# Patient Record
Sex: FEMALE | Race: WHITE | Employment: OTHER | URBAN - METROPOLITAN AREA
[De-identification: names, ages, dates, MRNs, and addresses within clinical notes are randomized per-mention and may not be internally consistent; named-entity substitution may affect disease eponyms.]

---

## 2017-04-17 PROBLEM — E66.9 OBESITY (BMI 30.0-34.9): Status: ACTIVE | Noted: 2017-04-17

## 2017-04-17 PROBLEM — E66.811 OBESITY (BMI 30.0-34.9): Status: ACTIVE | Noted: 2017-04-17

## 2018-04-19 ENCOUNTER — OFFICE VISIT (OUTPATIENT)
Dept: ENT CLINIC | Age: 73
End: 2018-04-19
Payer: MEDICARE

## 2018-04-19 VITALS
WEIGHT: 167 LBS | SYSTOLIC BLOOD PRESSURE: 150 MMHG | HEART RATE: 83 BPM | OXYGEN SATURATION: 93 % | DIASTOLIC BLOOD PRESSURE: 86 MMHG | BODY MASS INDEX: 30.73 KG/M2 | HEIGHT: 62 IN

## 2018-04-19 DIAGNOSIS — J30.1 CHRONIC SEASONAL ALLERGIC RHINITIS DUE TO POLLEN: ICD-10-CM

## 2018-04-19 DIAGNOSIS — H61.23 BILATERAL IMPACTED CERUMEN: Primary | ICD-10-CM

## 2018-04-19 DIAGNOSIS — H69.81 ETD (EUSTACHIAN TUBE DYSFUNCTION), RIGHT: ICD-10-CM

## 2018-04-19 PROCEDURE — 99213 OFFICE O/P EST LOW 20 MIN: CPT | Performed by: OTOLARYNGOLOGY

## 2018-04-19 PROCEDURE — 69210 REMOVE IMPACTED EAR WAX UNI: CPT | Performed by: OTOLARYNGOLOGY

## 2018-04-19 ASSESSMENT — ENCOUNTER SYMPTOMS
COLOR CHANGE: 0
SHORTNESS OF BREATH: 0
GASTROINTESTINAL NEGATIVE: 1
ABDOMINAL PAIN: 0
EYES NEGATIVE: 1
RHINORRHEA: 1
RESPIRATORY NEGATIVE: 1

## 2018-04-24 ENCOUNTER — OFFICE VISIT (OUTPATIENT)
Dept: FAMILY MEDICINE CLINIC | Age: 73
End: 2018-04-24
Payer: MEDICARE

## 2018-04-24 VITALS
OXYGEN SATURATION: 98 % | BODY MASS INDEX: 31.24 KG/M2 | HEIGHT: 62 IN | HEART RATE: 53 BPM | DIASTOLIC BLOOD PRESSURE: 70 MMHG | SYSTOLIC BLOOD PRESSURE: 110 MMHG | RESPIRATION RATE: 18 BRPM | TEMPERATURE: 99 F | WEIGHT: 169.75 LBS

## 2018-04-24 DIAGNOSIS — Z00.00 ROUTINE GENERAL MEDICAL EXAMINATION AT A HEALTH CARE FACILITY: ICD-10-CM

## 2018-04-24 DIAGNOSIS — Z23 NEED FOR PROPHYLACTIC VACCINATION AND INOCULATION AGAINST VARICELLA: ICD-10-CM

## 2018-04-24 PROCEDURE — G0439 PPPS, SUBSEQ VISIT: HCPCS | Performed by: FAMILY MEDICINE

## 2018-04-24 RX ORDER — ASPIRIN 81 MG/1
81 TABLET, CHEWABLE ORAL DAILY
Qty: 90 TABLET | Refills: 1 | Status: SHIPPED | OUTPATIENT
Start: 2018-04-24 | End: 2019-07-07 | Stop reason: SDUPTHER

## 2018-04-24 ASSESSMENT — ANXIETY QUESTIONNAIRES: GAD7 TOTAL SCORE: 0

## 2018-04-24 ASSESSMENT — PATIENT HEALTH QUESTIONNAIRE - PHQ9: SUM OF ALL RESPONSES TO PHQ QUESTIONS 1-9: 0

## 2018-04-24 ASSESSMENT — LIFESTYLE VARIABLES: HOW OFTEN DO YOU HAVE A DRINK CONTAINING ALCOHOL: 0

## 2018-04-25 ENCOUNTER — TELEPHONE (OUTPATIENT)
Dept: FAMILY MEDICINE CLINIC | Age: 73
End: 2018-04-25

## 2018-04-25 DIAGNOSIS — E78.2 MIXED HYPERLIPIDEMIA: ICD-10-CM

## 2018-04-25 RX ORDER — SIMVASTATIN 20 MG
20 TABLET ORAL NIGHTLY
Qty: 90 TABLET | Refills: 1
Start: 2018-04-25 | End: 2018-05-31 | Stop reason: SDUPTHER

## 2018-05-24 ENCOUNTER — NURSE ONLY (OUTPATIENT)
Dept: FAMILY MEDICINE CLINIC | Age: 73
End: 2018-05-24
Payer: MEDICARE

## 2018-05-24 ENCOUNTER — HOSPITAL ENCOUNTER (OUTPATIENT)
Age: 73
Discharge: HOME | End: 2018-05-26
Payer: MEDICARE

## 2018-05-24 DIAGNOSIS — I10 ESSENTIAL HYPERTENSION: ICD-10-CM

## 2018-05-24 DIAGNOSIS — E78.1 PURE HYPERGLYCERIDEMIA: ICD-10-CM

## 2018-05-24 DIAGNOSIS — E55.9 VITAMIN D DEFICIENCY: ICD-10-CM

## 2018-05-24 DIAGNOSIS — E03.9 ACQUIRED HYPOTHYROIDISM: ICD-10-CM

## 2018-05-24 PROBLEM — Z00.00 HEALTH CARE MAINTENANCE: Status: RESOLVED | Noted: 2018-04-24 | Resolved: 2018-05-24

## 2018-05-24 LAB
ALBUMIN SERPL-MCNC: 4.6 G/DL (ref 3.5–5.2)
ALP BLD-CCNC: 58 U/L (ref 35–104)
ALT SERPL-CCNC: 17 U/L (ref 0–32)
ANION GAP SERPL CALCULATED.3IONS-SCNC: 15 MMOL/L (ref 7–16)
AST SERPL-CCNC: 26 U/L (ref 0–31)
BASOPHILS ABSOLUTE: 0.02 E9/L (ref 0–0.2)
BASOPHILS RELATIVE PERCENT: 0.3 % (ref 0–2)
BILIRUB SERPL-MCNC: 0.3 MG/DL (ref 0–1.2)
BUN BLDV-MCNC: 16 MG/DL (ref 8–23)
CALCIUM SERPL-MCNC: 10 MG/DL (ref 8.6–10.2)
CHLORIDE BLD-SCNC: 100 MMOL/L (ref 98–107)
CHOLESTEROL, TOTAL: 167 MG/DL (ref 0–199)
CO2: 29 MMOL/L (ref 22–29)
CREAT SERPL-MCNC: 0.8 MG/DL (ref 0.5–1)
EOSINOPHILS ABSOLUTE: 0.09 E9/L (ref 0.05–0.5)
EOSINOPHILS RELATIVE PERCENT: 1.3 % (ref 0–6)
GFR AFRICAN AMERICAN: >60
GFR NON-AFRICAN AMERICAN: >60 ML/MIN/1.73
GLUCOSE BLD-MCNC: 83 MG/DL (ref 74–109)
HCT VFR BLD CALC: 44.3 % (ref 34–48)
HDLC SERPL-MCNC: 40 MG/DL
HEMOGLOBIN: 13.8 G/DL (ref 11.5–15.5)
IMMATURE GRANULOCYTES #: 0.02 E9/L
IMMATURE GRANULOCYTES %: 0.3 % (ref 0–5)
LDL CHOLESTEROL CALCULATED: 100 MG/DL (ref 0–99)
LYMPHOCYTES ABSOLUTE: 1.81 E9/L (ref 1.5–4)
LYMPHOCYTES RELATIVE PERCENT: 26.7 % (ref 20–42)
MCH RBC QN AUTO: 30.5 PG (ref 26–35)
MCHC RBC AUTO-ENTMCNC: 31.2 % (ref 32–34.5)
MCV RBC AUTO: 98 FL (ref 80–99.9)
MONOCYTES ABSOLUTE: 0.43 E9/L (ref 0.1–0.95)
MONOCYTES RELATIVE PERCENT: 6.4 % (ref 2–12)
NEUTROPHILS ABSOLUTE: 4.4 E9/L (ref 1.8–7.3)
NEUTROPHILS RELATIVE PERCENT: 65 % (ref 43–80)
PDW BLD-RTO: 13 FL (ref 11.5–15)
PLATELET # BLD: 180 E9/L (ref 130–450)
PMV BLD AUTO: 11.3 FL (ref 7–12)
POTASSIUM SERPL-SCNC: 4.2 MMOL/L (ref 3.5–5)
RBC # BLD: 4.52 E12/L (ref 3.5–5.5)
SODIUM BLD-SCNC: 144 MMOL/L (ref 132–146)
T4 TOTAL: 9.4 MCG/DL (ref 4.5–11.7)
TOTAL PROTEIN: 7.3 G/DL (ref 6.4–8.3)
TRIGL SERPL-MCNC: 137 MG/DL (ref 0–149)
TSH SERPL DL<=0.05 MIU/L-ACNC: 0.78 UIU/ML (ref 0.27–4.2)
VITAMIN D 25-HYDROXY: 53 NG/ML (ref 30–100)
VLDLC SERPL CALC-MCNC: 27 MG/DL
WBC # BLD: 6.8 E9/L (ref 4.5–11.5)

## 2018-05-24 PROCEDURE — 85025 COMPLETE CBC W/AUTO DIFF WBC: CPT

## 2018-05-24 PROCEDURE — 82306 VITAMIN D 25 HYDROXY: CPT

## 2018-05-24 PROCEDURE — 80061 LIPID PANEL: CPT

## 2018-05-24 PROCEDURE — 84443 ASSAY THYROID STIM HORMONE: CPT

## 2018-05-24 PROCEDURE — 36415 COLL VENOUS BLD VENIPUNCTURE: CPT | Performed by: FAMILY MEDICINE

## 2018-05-24 PROCEDURE — 84436 ASSAY OF TOTAL THYROXINE: CPT

## 2018-05-24 PROCEDURE — 80053 COMPREHEN METABOLIC PANEL: CPT

## 2018-05-31 ENCOUNTER — OFFICE VISIT (OUTPATIENT)
Dept: FAMILY MEDICINE CLINIC | Age: 73
End: 2018-05-31
Payer: MEDICARE

## 2018-05-31 VITALS
TEMPERATURE: 99.2 F | BODY MASS INDEX: 31.12 KG/M2 | HEIGHT: 62 IN | HEART RATE: 53 BPM | WEIGHT: 169.12 LBS | RESPIRATION RATE: 16 BRPM | OXYGEN SATURATION: 95 % | DIASTOLIC BLOOD PRESSURE: 80 MMHG | SYSTOLIC BLOOD PRESSURE: 138 MMHG

## 2018-05-31 DIAGNOSIS — Z12.39 BREAST CANCER SCREENING: ICD-10-CM

## 2018-05-31 DIAGNOSIS — I10 ESSENTIAL HYPERTENSION: Primary | ICD-10-CM

## 2018-05-31 DIAGNOSIS — E78.2 MIXED HYPERLIPIDEMIA: ICD-10-CM

## 2018-05-31 DIAGNOSIS — Z00.00 HEALTH CARE MAINTENANCE: ICD-10-CM

## 2018-05-31 PROCEDURE — 99213 OFFICE O/P EST LOW 20 MIN: CPT | Performed by: FAMILY MEDICINE

## 2018-05-31 RX ORDER — BENAZEPRIL HYDROCHLORIDE 40 MG/1
TABLET, FILM COATED ORAL
Qty: 90 TABLET | Refills: 1 | Status: SHIPPED | OUTPATIENT
Start: 2018-05-31 | End: 2019-01-08 | Stop reason: SDUPTHER

## 2018-05-31 RX ORDER — SIMVASTATIN 20 MG
20 TABLET ORAL NIGHTLY
Qty: 90 TABLET | Refills: 1 | Status: SHIPPED | OUTPATIENT
Start: 2018-05-31 | End: 2019-01-08 | Stop reason: SDUPTHER

## 2018-05-31 RX ORDER — UBIDECARENONE 10 MG
120 CAPSULE ORAL DAILY
Qty: 120 CAPSULE | Refills: 2
Start: 2018-05-31 | End: 2018-10-11 | Stop reason: SDUPTHER

## 2018-05-31 RX ORDER — POTASSIUM CHLORIDE 750 MG/1
TABLET, EXTENDED RELEASE ORAL
Qty: 90 TABLET | Refills: 1 | Status: SHIPPED | OUTPATIENT
Start: 2018-05-31 | End: 2018-10-15 | Stop reason: SDUPTHER

## 2018-05-31 ASSESSMENT — ENCOUNTER SYMPTOMS
SPUTUM PRODUCTION: 0
NAUSEA: 0
WHEEZING: 0
SHORTNESS OF BREATH: 0
BLOOD IN STOOL: 0
ORTHOPNEA: 0
BLURRED VISION: 0
ABDOMINAL PAIN: 0
CONSTIPATION: 0
SORE THROAT: 0
BACK PAIN: 0
COUGH: 0
VOMITING: 0
DIARRHEA: 0
HEARTBURN: 0
DOUBLE VISION: 0

## 2018-06-26 DIAGNOSIS — I10 ESSENTIAL HYPERTENSION: ICD-10-CM

## 2018-06-26 RX ORDER — DILTIAZEM HYDROCHLORIDE 360 MG/1
360 CAPSULE, EXTENDED RELEASE ORAL DAILY
Qty: 90 CAPSULE | Refills: 1 | Status: SHIPPED | OUTPATIENT
Start: 2018-06-26 | End: 2018-10-29 | Stop reason: SDUPTHER

## 2018-06-26 RX ORDER — HYDROCHLOROTHIAZIDE 25 MG/1
TABLET ORAL
Qty: 90 TABLET | Refills: 1 | Status: SHIPPED | OUTPATIENT
Start: 2018-06-26 | End: 2018-10-11 | Stop reason: SDUPTHER

## 2018-10-11 ENCOUNTER — OFFICE VISIT (OUTPATIENT)
Dept: ENT CLINIC | Age: 73
End: 2018-10-11
Payer: MEDICARE

## 2018-10-11 VITALS
WEIGHT: 167 LBS | BODY MASS INDEX: 30.73 KG/M2 | HEIGHT: 62 IN | DIASTOLIC BLOOD PRESSURE: 70 MMHG | SYSTOLIC BLOOD PRESSURE: 120 MMHG | OXYGEN SATURATION: 94 % | HEART RATE: 75 BPM

## 2018-10-11 DIAGNOSIS — J30.1 CHRONIC SEASONAL ALLERGIC RHINITIS DUE TO POLLEN: ICD-10-CM

## 2018-10-11 DIAGNOSIS — H61.23 BILATERAL IMPACTED CERUMEN: Primary | ICD-10-CM

## 2018-10-11 DIAGNOSIS — H69.81 ETD (EUSTACHIAN TUBE DYSFUNCTION), RIGHT: ICD-10-CM

## 2018-10-11 PROCEDURE — 99212 OFFICE O/P EST SF 10 MIN: CPT | Performed by: OTOLARYNGOLOGY

## 2018-10-11 ASSESSMENT — ENCOUNTER SYMPTOMS
RHINORRHEA: 1
GASTROINTESTINAL NEGATIVE: 1
SHORTNESS OF BREATH: 0
RESPIRATORY NEGATIVE: 1
COLOR CHANGE: 0
EYES NEGATIVE: 1
ABDOMINAL PAIN: 0

## 2018-10-15 DIAGNOSIS — I10 ESSENTIAL HYPERTENSION: ICD-10-CM

## 2018-10-16 RX ORDER — POTASSIUM CHLORIDE 750 MG/1
TABLET, EXTENDED RELEASE ORAL
Qty: 90 TABLET | Refills: 1 | Status: SHIPPED | OUTPATIENT
Start: 2018-10-16 | End: 2019-01-08 | Stop reason: SDUPTHER

## 2018-10-29 DIAGNOSIS — I10 ESSENTIAL HYPERTENSION: ICD-10-CM

## 2018-10-29 RX ORDER — DILTIAZEM HYDROCHLORIDE 360 MG/1
360 CAPSULE, EXTENDED RELEASE ORAL DAILY
Qty: 90 CAPSULE | Refills: 1 | Status: SHIPPED | OUTPATIENT
Start: 2018-10-29 | End: 2019-01-08 | Stop reason: SDUPTHER

## 2018-10-29 NOTE — TELEPHONE ENCOUNTER
From: Arsen Carroll  Sent: 10/27/2018 1:06 PM EDT  Subject: Medication Renewal Request    Arsen Carroll would like a refill of the following medications:     diltiazem (TIAZAC) 360 MG extended release capsule Mirza Arrieta MD]   Patient Comment: PLEASE SEND SCRIPT TO [address] [place] [address]    Preferred pharmacy: [address] [phone]

## 2018-10-30 ENCOUNTER — TELEPHONE (OUTPATIENT)
Dept: ENT CLINIC | Age: 73
End: 2018-10-30

## 2018-10-30 ENCOUNTER — TELEPHONE (OUTPATIENT)
Dept: FAMILY MEDICINE CLINIC | Age: 73
End: 2018-10-30

## 2018-10-30 ENCOUNTER — OFFICE VISIT (OUTPATIENT)
Dept: ENT CLINIC | Age: 73
End: 2018-10-30
Payer: MEDICARE

## 2018-10-30 VITALS
OXYGEN SATURATION: 93 % | HEART RATE: 77 BPM | WEIGHT: 167 LBS | DIASTOLIC BLOOD PRESSURE: 93 MMHG | BODY MASS INDEX: 30.73 KG/M2 | SYSTOLIC BLOOD PRESSURE: 163 MMHG | HEIGHT: 62 IN

## 2018-10-30 DIAGNOSIS — H61.23 BILATERAL IMPACTED CERUMEN: Primary | ICD-10-CM

## 2018-10-30 PROCEDURE — 92504 EAR MICROSCOPY EXAMINATION: CPT | Performed by: OTOLARYNGOLOGY

## 2018-10-30 PROCEDURE — 99213 OFFICE O/P EST LOW 20 MIN: CPT | Performed by: OTOLARYNGOLOGY

## 2018-10-30 ASSESSMENT — ENCOUNTER SYMPTOMS
ABDOMINAL PAIN: 0
EYES NEGATIVE: 1
RESPIRATORY NEGATIVE: 1
COLOR CHANGE: 0
SHORTNESS OF BREATH: 0
GASTROINTESTINAL NEGATIVE: 1
RHINORRHEA: 1

## 2018-10-30 NOTE — TELEPHONE ENCOUNTER
Dr. Taylor [facility] office called and said Germaine Archer was in their office today and her blood pressure readings were 163/93 and 156/83

## 2018-11-06 ENCOUNTER — OFFICE VISIT (OUTPATIENT)
Dept: ENT CLINIC | Age: 73
End: 2018-11-06
Payer: MEDICARE

## 2018-11-06 VITALS
WEIGHT: 167 LBS | HEIGHT: 62 IN | HEART RATE: 79 BPM | OXYGEN SATURATION: 99 % | DIASTOLIC BLOOD PRESSURE: 89 MMHG | SYSTOLIC BLOOD PRESSURE: 158 MMHG | BODY MASS INDEX: 30.73 KG/M2

## 2018-11-06 DIAGNOSIS — H61.23 BILATERAL IMPACTED CERUMEN: Primary | ICD-10-CM

## 2018-11-06 PROCEDURE — 99212 OFFICE O/P EST SF 10 MIN: CPT | Performed by: OTOLARYNGOLOGY

## 2018-11-06 ASSESSMENT — ENCOUNTER SYMPTOMS
COLOR CHANGE: 0
EYES NEGATIVE: 1
GASTROINTESTINAL NEGATIVE: 1
RHINORRHEA: 1
SHORTNESS OF BREATH: 0
ABDOMINAL PAIN: 0
RESPIRATORY NEGATIVE: 1

## 2018-11-06 NOTE — PROGRESS NOTES
Subjective:      Patient ID:  Chinmay He is a 68 y.o. female. HPI:    Pt presents with a history of cerumen impaction removal.   The patients ear was last cleaned 1 week ago. The patient was using ear drops to loosen wax immediately prior to thisvisit. Hearing aids: no      Patient's medications, allergies, past medical,surgical, social and family histories were reviewed and updated as appropriate. Review of Systems   Constitutional: Negative. HENT: Positive for congestion, postnasal drip and rhinorrhea. Negative for ear discharge, ear pain and hearing loss. Eyes: Negative. Negative for visual disturbance. Respiratory: Negative. Negative for shortness of breath. Cardiovascular: Negative. Negative for chest pain. Gastrointestinal: Negative. Negative for abdominal pain. Genitourinary: Negative. Musculoskeletal: Negative. Skin: Negative. Negative for color change. Neurological: Negative. Psychiatric/Behavioral: Negative. Negative for behavioral problems and hallucinations. All other systems reviewed and are negative. Objective:   Physical Exam   Constitutional: She is oriented to person, place, and time. She appears well-developed and well-nourished. HENT:   Head: Normocephalic and atraumatic. Right Ear: Tympanic membrane, external ear and ear canal normal. Decreased hearing is noted. Left Ear: Hearing, tympanic membrane, external ear and ear canal normal.   Nose: Mucosal edema and rhinorrhea present. Mouth/Throat: Uvula is midline, oropharynx is clear and moist and mucous membranes are normal.   Cerumen is really hard in the right ear today and causing the patient pain on moving it. Eyes: Pupils are equal, round, and reactive to light. Conjunctivae and EOM are normal.   Neck: Normal range of motion. Neck supple. Cardiovascular: Normal rate, regular rhythm and normal heart sounds.     Pulmonary/Chest: Effort normal and breath sounds

## 2018-11-08 ENCOUNTER — TELEPHONE (OUTPATIENT)
Dept: ENT CLINIC | Age: 73
End: 2018-11-08

## 2018-11-16 ENCOUNTER — HOSPITAL ENCOUNTER (OUTPATIENT)
Dept: GENERAL RADIOLOGY | Age: 73
Discharge: HOME | End: 2018-11-18
Payer: MEDICARE

## 2018-11-16 DIAGNOSIS — Z00.00 HEALTH CARE MAINTENANCE: ICD-10-CM

## 2018-11-16 DIAGNOSIS — Z12.39 BREAST CANCER SCREENING: ICD-10-CM

## 2018-11-16 PROCEDURE — 77063 BREAST TOMOSYNTHESIS BI: CPT

## 2018-12-06 RX ORDER — POTASSIUM CHLORIDE 750 MG/1
TABLET, FILM COATED, EXTENDED RELEASE ORAL
Qty: 90 TABLET | Refills: 1 | Status: SHIPPED | OUTPATIENT
Start: 2018-12-06 | End: 2019-05-02 | Stop reason: ALTCHOICE

## 2019-01-08 ENCOUNTER — OFFICE VISIT (OUTPATIENT)
Dept: FAMILY MEDICINE CLINIC | Age: 74
End: 2019-01-08
Payer: MEDICARE

## 2019-01-08 VITALS
HEIGHT: 62 IN | BODY MASS INDEX: 30.91 KG/M2 | SYSTOLIC BLOOD PRESSURE: 138 MMHG | HEART RATE: 76 BPM | DIASTOLIC BLOOD PRESSURE: 72 MMHG | TEMPERATURE: 98.6 F | OXYGEN SATURATION: 95 % | WEIGHT: 168 LBS

## 2019-01-08 DIAGNOSIS — R06.02 SHORTNESS OF BREATH: ICD-10-CM

## 2019-01-08 DIAGNOSIS — E55.9 VITAMIN D DEFICIENCY: ICD-10-CM

## 2019-01-08 DIAGNOSIS — R53.83 FATIGUE, UNSPECIFIED TYPE: Primary | ICD-10-CM

## 2019-01-08 DIAGNOSIS — E78.2 MIXED HYPERLIPIDEMIA: ICD-10-CM

## 2019-01-08 DIAGNOSIS — E03.9 ACQUIRED HYPOTHYROIDISM: ICD-10-CM

## 2019-01-08 DIAGNOSIS — I10 ESSENTIAL HYPERTENSION: ICD-10-CM

## 2019-01-08 PROCEDURE — 99215 OFFICE O/P EST HI 40 MIN: CPT | Performed by: FAMILY MEDICINE

## 2019-01-08 PROCEDURE — 93000 ELECTROCARDIOGRAM COMPLETE: CPT | Performed by: FAMILY MEDICINE

## 2019-01-08 RX ORDER — DILTIAZEM HYDROCHLORIDE 360 MG/1
360 CAPSULE, EXTENDED RELEASE ORAL DAILY
Qty: 90 CAPSULE | Refills: 1 | Status: SHIPPED | OUTPATIENT
Start: 2019-01-08 | End: 2019-02-01 | Stop reason: SDUPTHER

## 2019-01-08 RX ORDER — HYDROCHLOROTHIAZIDE 25 MG/1
TABLET ORAL
Qty: 90 TABLET | Refills: 1 | Status: SHIPPED | OUTPATIENT
Start: 2019-01-08 | End: 2019-02-14 | Stop reason: SDUPTHER

## 2019-01-08 RX ORDER — SIMVASTATIN 20 MG
20 TABLET ORAL NIGHTLY
Qty: 90 TABLET | Refills: 1 | Status: SHIPPED | OUTPATIENT
Start: 2019-01-08 | End: 2019-02-01 | Stop reason: SDUPTHER

## 2019-01-08 RX ORDER — BENAZEPRIL HYDROCHLORIDE 40 MG/1
TABLET, FILM COATED ORAL
Qty: 90 TABLET | Refills: 1 | Status: SHIPPED | OUTPATIENT
Start: 2019-01-08 | End: 2019-02-01 | Stop reason: SDUPTHER

## 2019-01-08 RX ORDER — POTASSIUM CHLORIDE 750 MG/1
TABLET, EXTENDED RELEASE ORAL
Qty: 90 TABLET | Refills: 1
Start: 2019-01-08 | End: 2019-07-07 | Stop reason: SDUPTHER

## 2019-01-08 RX ORDER — LEVOTHYROXINE SODIUM 112 MCG
TABLET ORAL
Qty: 90 TABLET | Refills: 1 | Status: SHIPPED | OUTPATIENT
Start: 2019-01-08 | End: 2019-02-14 | Stop reason: SDUPTHER

## 2019-01-08 ASSESSMENT — ENCOUNTER SYMPTOMS
SPUTUM PRODUCTION: 0
CONSTIPATION: 0
DIARRHEA: 0
ORTHOPNEA: 0
VOMITING: 0
SHORTNESS OF BREATH: 0
SORE THROAT: 0
NAUSEA: 0
DOUBLE VISION: 0
COUGH: 0
ABDOMINAL PAIN: 0
WHEEZING: 0
BLURRED VISION: 0
HEARTBURN: 0
BACK PAIN: 0
BLOOD IN STOOL: 0

## 2019-01-15 ENCOUNTER — HOSPITAL ENCOUNTER (OUTPATIENT)
Dept: NUCLEAR MEDICINE | Age: 74
Discharge: HOME | End: 2019-01-17
Payer: MEDICARE

## 2019-01-15 ENCOUNTER — HOSPITAL ENCOUNTER (OUTPATIENT)
Dept: NON INVASIVE DIAGNOSTICS | Age: 74
Discharge: HOME | End: 2019-01-15
Payer: MEDICARE

## 2019-01-15 ENCOUNTER — TELEPHONE (OUTPATIENT)
Dept: FAMILY MEDICINE CLINIC | Age: 74
End: 2019-01-15

## 2019-01-15 DIAGNOSIS — R06.02 SHORTNESS OF BREATH: ICD-10-CM

## 2019-01-15 LAB
LV EF: 80 %
LVEF MODALITY: NORMAL

## 2019-01-15 PROCEDURE — 3430000000 HC RX DIAGNOSTIC RADIOPHARMACEUTICAL: Performed by: RADIOLOGY

## 2019-01-15 PROCEDURE — 93016 CV STRESS TEST SUPVJ ONLY: CPT | Performed by: INTERNAL MEDICINE

## 2019-01-15 PROCEDURE — 93018 CV STRESS TEST I&R ONLY: CPT | Performed by: INTERNAL MEDICINE

## 2019-01-15 PROCEDURE — 6360000002 HC RX W HCPCS: Performed by: FAMILY MEDICINE

## 2019-01-15 PROCEDURE — A9500 TC99M SESTAMIBI: HCPCS | Performed by: RADIOLOGY

## 2019-01-15 PROCEDURE — 78452 HT MUSCLE IMAGE SPECT MULT: CPT

## 2019-01-15 PROCEDURE — 93017 CV STRESS TEST TRACING ONLY: CPT

## 2019-01-15 RX ADMIN — Medication 11 MILLICURIE: at 07:55

## 2019-01-15 RX ADMIN — Medication 34 MILLICURIE: at 10:13

## 2019-01-15 RX ADMIN — REGADENOSON 0.4 MG: 0.08 INJECTION, SOLUTION INTRAVENOUS at 09:41

## 2019-02-01 DIAGNOSIS — E78.2 MIXED HYPERLIPIDEMIA: ICD-10-CM

## 2019-02-01 DIAGNOSIS — I10 ESSENTIAL HYPERTENSION: ICD-10-CM

## 2019-02-01 RX ORDER — DILTIAZEM HYDROCHLORIDE 360 MG/1
360 CAPSULE, EXTENDED RELEASE ORAL DAILY
Qty: 90 CAPSULE | Refills: 1 | Status: SHIPPED | OUTPATIENT
Start: 2019-02-01 | End: 2019-07-07 | Stop reason: SDUPTHER

## 2019-02-01 RX ORDER — SIMVASTATIN 20 MG
20 TABLET ORAL NIGHTLY
Qty: 90 TABLET | Refills: 1 | Status: SHIPPED | OUTPATIENT
Start: 2019-02-01 | End: 2019-04-24 | Stop reason: SDUPTHER

## 2019-02-01 RX ORDER — FLUTICASONE PROPIONATE 50 MCG
2 SPRAY, SUSPENSION (ML) NASAL DAILY
Qty: 1 BOTTLE | Refills: 3 | Status: SHIPPED | OUTPATIENT
Start: 2019-02-01 | End: 2019-07-07 | Stop reason: SDUPTHER

## 2019-02-01 RX ORDER — BENAZEPRIL HYDROCHLORIDE 40 MG/1
TABLET, FILM COATED ORAL
Qty: 90 TABLET | Refills: 1 | Status: SHIPPED | OUTPATIENT
Start: 2019-02-01 | End: 2019-04-24 | Stop reason: SDUPTHER

## 2019-02-14 DIAGNOSIS — E03.9 ACQUIRED HYPOTHYROIDISM: ICD-10-CM

## 2019-02-14 DIAGNOSIS — I10 ESSENTIAL HYPERTENSION: ICD-10-CM

## 2019-02-14 RX ORDER — HYDROCHLOROTHIAZIDE 25 MG/1
TABLET ORAL
Qty: 90 TABLET | Refills: 1 | Status: SHIPPED | OUTPATIENT
Start: 2019-02-14 | End: 2019-04-24 | Stop reason: SDUPTHER

## 2019-02-14 RX ORDER — LEVOTHYROXINE SODIUM 112 MCG
TABLET ORAL
Qty: 90 TABLET | Refills: 1 | Status: SHIPPED | OUTPATIENT
Start: 2019-02-14 | End: 2019-04-24 | Stop reason: SDUPTHER

## 2019-04-10 DIAGNOSIS — I10 ESSENTIAL HYPERTENSION: ICD-10-CM

## 2019-04-11 RX ORDER — POTASSIUM CHLORIDE 750 MG/1
TABLET, EXTENDED RELEASE ORAL
Qty: 90 TABLET | Refills: 1 | Status: ON HOLD | OUTPATIENT
Start: 2019-04-11 | End: 2019-05-08 | Stop reason: ALTCHOICE

## 2019-04-24 ENCOUNTER — OFFICE VISIT (OUTPATIENT)
Dept: FAMILY MEDICINE CLINIC | Age: 74
End: 2019-04-24
Payer: MEDICARE

## 2019-04-24 VITALS
OXYGEN SATURATION: 96 % | BODY MASS INDEX: 31.1 KG/M2 | DIASTOLIC BLOOD PRESSURE: 80 MMHG | WEIGHT: 169 LBS | HEART RATE: 69 BPM | TEMPERATURE: 98.3 F | HEIGHT: 62 IN | SYSTOLIC BLOOD PRESSURE: 160 MMHG

## 2019-04-24 DIAGNOSIS — I48.0 PAROXYSMAL ATRIAL FIBRILLATION (HCC): ICD-10-CM

## 2019-04-24 DIAGNOSIS — E78.2 MIXED HYPERLIPIDEMIA: ICD-10-CM

## 2019-04-24 DIAGNOSIS — E03.9 ACQUIRED HYPOTHYROIDISM: ICD-10-CM

## 2019-04-24 DIAGNOSIS — K59.04 CHRONIC IDIOPATHIC CONSTIPATION: ICD-10-CM

## 2019-04-24 DIAGNOSIS — Z00.00 ROUTINE GENERAL MEDICAL EXAMINATION AT A HEALTH CARE FACILITY: Primary | ICD-10-CM

## 2019-04-24 DIAGNOSIS — I10 ESSENTIAL HYPERTENSION: ICD-10-CM

## 2019-04-24 PROCEDURE — G0439 PPPS, SUBSEQ VISIT: HCPCS | Performed by: FAMILY MEDICINE

## 2019-04-24 RX ORDER — POLYETHYLENE GLYCOL 3350 17 G/17G
POWDER, FOR SOLUTION ORAL
Qty: 527 G | Refills: 3 | Status: SHIPPED | OUTPATIENT
Start: 2019-04-24 | End: 2019-05-02 | Stop reason: ALTCHOICE

## 2019-04-24 RX ORDER — LEVOTHYROXINE SODIUM 112 MCG
TABLET ORAL
Qty: 90 TABLET | Refills: 3 | Status: SHIPPED | OUTPATIENT
Start: 2019-04-24 | End: 2019-07-07 | Stop reason: SDUPTHER

## 2019-04-24 RX ORDER — HYDROCHLOROTHIAZIDE 25 MG/1
TABLET ORAL
Qty: 90 TABLET | Refills: 3 | Status: SHIPPED | OUTPATIENT
Start: 2019-04-24 | End: 2019-07-07 | Stop reason: SDUPTHER

## 2019-04-24 RX ORDER — SIMVASTATIN 20 MG
20 TABLET ORAL NIGHTLY
Qty: 90 TABLET | Refills: 3 | Status: SHIPPED | OUTPATIENT
Start: 2019-04-24 | End: 2019-07-07 | Stop reason: SDUPTHER

## 2019-04-24 RX ORDER — BENAZEPRIL HYDROCHLORIDE 40 MG/1
TABLET, FILM COATED ORAL
Qty: 90 TABLET | Refills: 3 | Status: SHIPPED | OUTPATIENT
Start: 2019-04-24 | End: 2019-07-07 | Stop reason: SDUPTHER

## 2019-04-24 ASSESSMENT — LIFESTYLE VARIABLES: HOW OFTEN DO YOU HAVE A DRINK CONTAINING ALCOHOL: 0

## 2019-04-24 ASSESSMENT — PATIENT HEALTH QUESTIONNAIRE - PHQ9
SUM OF ALL RESPONSES TO PHQ QUESTIONS 1-9: 0
SUM OF ALL RESPONSES TO PHQ QUESTIONS 1-9: 0

## 2019-04-24 NOTE — PROGRESS NOTES
Medicare Annual Wellness Visit  Name: Jeb Rodriguez Date: 2019   MRN: <[id number]> Sex: Female   Age: 68 y.o. Ethnicity: Non-/Non    : 1945 Race: Heidi Aguilera is here for Medicare AWV and Medication Refill (pending meds and also wants miralax)    Further, patient is requesting a referral to Dr. Barb Sousa to have yearly check ups of her heart. She has a diagnosis of paroxysmal atrial fibrillation. Referral placed. Screenings for behavioral, psychosocial and functional/safety risks, and cognitive dysfunction are all negative except as indicated below. These results, as well as other patient data from the [address] form, are documented in Flowsheets linked to this Encounter. Allergies   Allergen Reactions    Albuterol Swelling    Atrovent Inhaler Swelling    Niacin And Related Swelling    Codeine Rash     Codeine And Related     Prior to Visit Medications    Medication Sig Taking?  Authorizing Provider   potassium chloride (KLOR-CON M) 10 MEQ extended release tablet take 1 tablet by mouth once daily Yes Kristen Morgan MD   hydrochlorothiazide (HYDRODIURIL) 25 MG tablet TAKE 1 TABLET BY MOUTH EVERY DAY FOR HIGH BLOOD PRESSURE Yes Shanta Hogan MD   SYNTHROID 112 MCG tablet TAKE 1 TABLET BY MOUTH EVERY DAY ON AN EMPTY STOMACH Yes Shanta Hogan MD   fluticasone (FLONASE) 50 MCG/ACT nasal spray 2 sprays by Nasal route daily 2 sprays in each nostril daily Yes Shanta Hogan MD   benazepril (LOTENSIN) 40 MG tablet TAKE 1 TABLET BY MOUTH EVERY DAY FOR HIGH BLOOD PRESSURE Yes Shanta Hogan MD   simvastatin (ZOCOR) 20 MG tablet Take 1 tablet by mouth nightly Take 1 tablet nightly Yes Shanta Hogan MD   diltiazem (TIAZAC) 360 MG extended release capsule Take 1 capsule by mouth daily Yes Shanta Hogan MD   polyethylene glycol (GLYCOLAX) powder take 17 grams DISSOLVED IN WATER by mouth once daily Yes Savanna Finley MD   potassium chloride (KLOR-CON M10) 10 MEQ extended release tablet TAKE 1 TABLET BY MOUTH EVERY DAY Yes Shanta Hogan MD   potassium chloride (KLOR-CON) 10 MEQ extended release tablet take 1 tablet by mouth once daily Yes Savanna Finley MD   aspirin 81 MG chewable tablet Take 1 tablet by mouth daily Yes Savanna Finley MD   clobetasol (TEMOVATE) 0.05 % cream Apply topically 2 times daily Apply topically 2 times daily. Yes Beatrice Helton MD   Cholecalciferol (VITAMIN D-3) 1000 units CAPS Take 2,000 Units by mouth daily Yes Savanna Finley MD   senna (SENOKOT) 8.6 MG tablet Take 1 tablet by mouth 2 times daily Yes Dinorah Coleman MD   docusate sodium (COLACE) 100 MG capsule Take 2 capsules by mouth 3 times daily Yes Dinorah Coleman MD   fish oil-omega-3 fatty acids 1000 MG capsule Take 2 capsules by mouth 2 times daily. Yes Wilfrid Roy MD   calcium-vitamin D (OSCAL-500) 500-200 MG-UNIT per tablet Take 1 tablet by mouth daily. Yes Wilfrid Roy MD     Past Medical History:   Diagnosis Date    Atrial fibrillation ([facility] [address]     Gastritis with hemorrhage     Hyperlipidemia     Hypertension     Hypothyroidism     Obesity (BMI 30.0-34. 9) 4/17/2017    Paroxysmal atrial fibrillation [facility])      Past Surgical History:   Procedure Laterality Date    ANKLE SURGERY  11/2001    right    CARPAL TUNNEL RELEASE  1990's    bilateral    COLONOSCOPY  2/28/2008    diverticulosis internal and external hemorrhoids.  COLONOSCOPY  6/17/13    DENTAL SURGERY      EYE SURGERY      FRACTURE SURGERY Right 11/2001    ORIF right ankle    HEMORRHOID SURGERY  2004    HEMORRHOID SURGERY  6/1/15    MYRINGOTOMY AND TYMPANOSTOMY TUBE PLACEMENT Right 4/22/2015    SEPTOPLASTY  11/13/2013    and tonia ear exam    SKIN BIOPSY  07/07/2016    ear    UPPER GASTROINTESTINAL ENDOSCOPY  2/28/2008    gastric ulcer.  Dr. Rubio Degree History   Problem Relation Age of Onset   Elissa Grimm Cancer Mother          at 61 because of lung cancer    Heart Disease Father          at 79 secondary to MI    Diabetes Sister 77       CareTeam (Including outside providers/suppliers regularly involved in providing care):   Patient Care Team:  Letha Medrano MD as PCP - General (Family Medicine)  Letha Medrano MD as PCP - [facility] Attributed Provider  Ambrose Calixto DO as Surgeon (Otolaryngology)  Cornelia Combs MD as Consulting Physician (Dermatology)  Sol Smith MD as Surgeon (Trauma Surgery)    Wt Readings from Last 3 Encounters:   19 169 lb (76.7 kg)   19 168 lb (76.2 kg)   18 167 lb (75.8 kg)     Vitals:    19 1334 19 1345   BP: (!) 160/70 (!) 160/80   Pulse: 69    Temp: 98.3 °F (36.8 °C)    SpO2: 96%    Weight: 169 lb (76.7 kg)    Height: 5' 2\" (1.575 m)      Body mass index is 30.91 kg/m². Cognitive Screening:  Based upon direct observation of the patient, evaluation of cognition reveals recent and remote memory intact. Patient's complete Health Risk Assessment and screening values have been reviewed and are found in Flowsheets. The following problems were reviewed today and where indicated follow up appointments were made and/or referrals ordered. Positive Risk Factor Screenings with Interventions:     Health Habits/Nutrition:  Health Habits/Nutrition  Do you exercise for at least 20 minutes 2-3 times per week?: (!) No  Have you lost any weight without trying in the past 3 months?: No  Do you eat fewer than 2 meals per day?: No  Have you seen a dentist within the past year?: Yes  Body mass index is 30.91 kg/m².   Health Habits/Nutrition Interventions:  · Inadequate physical activity:  patient is not ready to increase his/her physical activity level at this time    Personalized Preventive Plan   Current Health Maintenance Status  Immunization History   Administered Date(s) Administered    Influenza Virus Vaccine 11/02/2010, 10/07/2011, 10/07/2012, 09/22/2014    Influenza, High Dose (Fluzone 65 yrs and older) 09/22/2015, 10/17/2016, 10/11/2017, 10/30/2018    Pneumococcal 13-valent Conjugate ([id number]) 09/22/2015    Pneumococcal Conjugate 7-valent 05/11/2009    Pneumococcal Polysaccharide ([id number]) 06/13/2014    Zoster Live (Zostavax) 07/17/2013    Zoster Subunit (Shingrix) 05/09/2018, 05/09/2018, 10/30/2018        Health Maintenance   Topic Date Due    DTaP/Tdap/Td vaccine (1 - Tdap) 01/01/2050 (Originally 9/13/1964)    TSH testing  05/24/2019    Potassium monitoring  05/24/2019    Creatinine monitoring  05/24/2019    Breast cancer screen  11/16/2020    Lipid screen  05/24/2023    Colon cancer screen colonoscopy  06/17/2023    Flu vaccine  Completed    Shingles Vaccine  Completed    Pneumococcal 65+ years Vaccine  Completed    DEXA (modify frequency per FRAX score)  Addressed    Hepatitis C screen  Discontinued     Recommendations for Preventive Services Due: see orders and patient instructions/AVS.  . Recommended screening schedule for the next 5-10 years is provided to the patient in written form: see Patient Instructions/AVS.           Assessment / Plan:      Kathy Benitez was seen today for medicare awv and medication refill. Diagnoses and all orders for this visit:    Medicare Annual Wellness Exam, subsequent    Essential hypertension:  Well controlled; medication refills  -     benazepril (LOTENSIN) 40 MG tablet; TAKE 1 TABLET BY MOUTH EVERY DAY FOR HIGH BLOOD PRESSURE  -     hydrochlorothiazide (HYDRODIURIL) 25 MG tablet; TAKE 1 TABLET BY MOUTH EVERY DAY FOR HIGH BLOOD PRESSURE    Mixed hyperlipidemia:  Well controlled; medication refills  -     simvastatin (ZOCOR) 20 MG tablet;  Take 1 tablet by mouth nightly Indications: High Amount of Fats in the Blood Take 1 tablet nightly    Acquired hypothyroidism:  Well controlled; medication refills  -     SYNTHROID

## 2019-04-24 NOTE — PATIENT INSTRUCTIONS
Personalized Preventive Plan for Adriel Vega - 4/24/2019  Medicare offers a range of preventive health benefits. Some of the tests and screenings are paid in full while other may be subject to a deductible, co-insurance, and/or copay. Some of these benefits include a comprehensive review of your medical history including lifestyle, illnesses that may run in your family, and various assessments and screenings as appropriate. After reviewing your medical record and screening and assessments performed today your provider may have ordered immunizations, labs, imaging, and/or referrals for you. A list of these orders (if applicable) as well as your Preventive Care list are included within your After Visit Summary for your review. Other Preventive Recommendations:    · A preventive eye exam performed by an eye specialist is recommended every 1-2 years to screen for glaucoma; cataracts, macular degeneration, and other eye disorders. · A preventive dental visit is recommended every 6 months. · Try to get at least 150 minutes of exercise per week or 10,000 steps per day on a pedometer . · Order or download the FREE \"Exercise & Physical Activity: Your Everyday Guide\" from [employer]. Call 6-389.744.7843 or search [employer] online. · You need 3017-6071 mg of calcium and 8980-4631 IU of vitamin D per day. It is possible to meet your calcium requirement with diet alone, but a vitamin D supplement is usually necessary to meet this goal.  · When exposed to the sun, use a sunscreen that protects against both UVA and UVB radiation with an SPF of 30 or greater. Reapply every 2 to 3 hours or after sweating, drying off with a towel, or swimming. · Always wear a seat belt when traveling in a car. Always wear a helmet when riding a bicycle or motorcycle.

## 2019-05-02 ENCOUNTER — TELEPHONE (OUTPATIENT)
Dept: ENT CLINIC | Age: 74
End: 2019-05-02

## 2019-05-02 NOTE — PROGRESS NOTES
Samantha 37 PRE-ADMISSION TESTING GENERAL INSTRUCTIONS- [facility]-phone number:[phone]    GENERAL INSTRUCTIONS  [x] Antibacterial Soap shower Night before and/or AM of Surgery  [] Ron wipe instruction sheet and wipes given. [x] Nothing by mouth after midnight, including gum, candy, mints, or water. [x] You may brush your teeth, gargle, but do NOT swallow water. []Hibiclens shower  the night before and the morning of surgery. Do not use             Hibiclens on your face or head. []No smoking, chewing tobacco, illegal drugs, or alcohol within 24 hours of your surgery. [x] Jewelry, valuables or body piercing's should not be brought to the hospital. All body and/or tongue piercing's must be removed prior to arriving to hospital.  ALL hair pins must be removed. [x] Do not wear makeup, lotions, powders, deodorant. Nail polish as directed by the nurse. [x] Arrange transportation with a responsible adult  to and from the hospital. If you do not have a responsible adult  to transport you, you will need to make arrangements with a medical transportation company (i.e. [employer]. A Uber/taxi/bus is not appropriate unless you are accompanied by a responsible adult ). Arrange for someone to be with you for the remainder of the day and for 24 hours after your procedure due to having had anesthesia. Who will be your  for transportation?________SISTER__________   Who will be staying with you for 24 hrs after your procedure?__________________  [x] Bring insurance card and photo ID.  [] Transfusion Bracelet: Please bring with you to hospital, day of surgery  [] Bring urine specimen day of surgery. Any small container is acceptable. [] Use inhalers the morning of surgery and bring with you to hospital.  [] Bring copy of living will or healthcare power of  papers to be placed in your electronic record.   [] CPAP/BI-PAP: Please bring your machine if you are to spend the night in the hospital.     PARKING INSTRUCTIONS:   [x] Arrival Time:______0530_______  · [] Parking lot '\"I\"  is located on [facility] (the corner of [facility] and [facility]). To enter, press the button and the gate will lift. A free token will be provided to exit the lot. One car per patient is allowed to park in this lot. All other cars are to park on [address] [place] [address] either in the parking garage or the handicap lot. [] To reach the [facility] lobby from [address] [place] [address], upon entering the hospital, take elevator B to the 3rd floor. EDUCATION INSTRUCTIONS:      [] Knee or hip replacement booklet & exercise pamphlets given. [] [address] placed in chart. [] Pre-admission Testing educational folder given  [] Incentive Spirometry,coughing & deep breathing exercises reviewed. []Medication information sheet(s)   []Fluoroscopy-Xray used in surgery reviewed with patient. Educational pamphlet placed in chart. []Pain: Post-op pain is normal and to be expected. You will be asked to rate your pain from 0-10(a zero is not acceptable-education is needed). Your post-op pain goal is:  [] Ask your nurse for your pain medication. [] Joint camp offered. [] Joint replacement booklets given. [] Other:___________________________    MEDICATION INSTRUCTIONS:   [x]Bring a complete list of your medications, please write the last time you took the medicine, give this list to the nurse. [x] Take the following medications the morning of surgery with 1-2 ounces of water: SEE MED SHEET  [x] Stop herbal supplements and vitamins 5 days before your surgery. [] DO NOT take any diabetic medicine the morning of surgery. Follow instructions for insulin the day before surgery. [] If you are diabetic and your blood sugar is low or you feel symptomatic, you may drink 1-2 ounces of apple juice or take a glucose tablet.   The morning of your procedure, you may call the pre-op area if you have concerns about your blood sugar [phone]. [] Use your inhalers the morning of surgery. Bring your emergency inhaler with you day of surgery. [] Follow physician instructions regarding any blood thinners you may be taking. WHAT TO EXPECT:  [x] The day of surgery you will be greeted and checked in by the [employer].  In addition, you will be registered in the [facility] by a Patient Access Representative. Please bring your photo ID and insurance card. A nurse will greet you in accordance to the time you are needed in the pre-op area to prepare you for surgery. Please do not be discouraged if you are not greeted in the order you arrive as there are many variables that are involved in patient preparation. Your patience is greatly appreciated as you wait for your nurse. Please bring in items such as: books, magazines, newspapers, electronics, or any other items  to occupy your time in the waiting area. []  Delays may occur with surgery and staff will make a sincere effort to keep you informed of delays. If any delays occur with your procedure, we apologize ahead of time for your inconvenience as we recognize the value of your time.

## 2019-05-02 NOTE — TELEPHONE ENCOUNTER
Patient called in wanting to know what time she had to be at hospital for surgery next week-patient directed to contact viviana downtowmarya

## 2019-05-08 ENCOUNTER — HOSPITAL ENCOUNTER (OUTPATIENT)
Age: 74
Setting detail: OUTPATIENT SURGERY
Discharge: HOME | End: 2019-05-08
Attending: OTOLARYNGOLOGY | Admitting: OTOLARYNGOLOGY
Payer: MEDICARE

## 2019-05-08 ENCOUNTER — ANESTHESIA (OUTPATIENT)
Dept: OPERATING ROOM | Age: 74
End: 2019-05-08
Payer: MEDICARE

## 2019-05-08 ENCOUNTER — ANESTHESIA EVENT (OUTPATIENT)
Dept: OPERATING ROOM | Age: 74
End: 2019-05-08
Payer: MEDICARE

## 2019-05-08 VITALS
HEIGHT: 62 IN | BODY MASS INDEX: 31.1 KG/M2 | DIASTOLIC BLOOD PRESSURE: 58 MMHG | TEMPERATURE: 97.6 F | SYSTOLIC BLOOD PRESSURE: 137 MMHG | HEART RATE: 56 BPM | RESPIRATION RATE: 17 BRPM | OXYGEN SATURATION: 95 % | WEIGHT: 169 LBS

## 2019-05-08 VITALS
DIASTOLIC BLOOD PRESSURE: 61 MMHG | OXYGEN SATURATION: 100 % | SYSTOLIC BLOOD PRESSURE: 115 MMHG | RESPIRATION RATE: 11 BRPM

## 2019-05-08 DIAGNOSIS — Z01.812 PRE-OPERATIVE LABORATORY EXAMINATION: Primary | ICD-10-CM

## 2019-05-08 LAB
ANION GAP SERPL CALCULATED.3IONS-SCNC: 12 MMOL/L (ref 7–16)
BUN BLDV-MCNC: 16 MG/DL (ref 8–23)
CALCIUM SERPL-MCNC: 9.8 MG/DL (ref 8.6–10.2)
CHLORIDE BLD-SCNC: 101 MMOL/L (ref 98–107)
CO2: 30 MMOL/L (ref 22–29)
CREAT SERPL-MCNC: 0.8 MG/DL (ref 0.5–1)
GFR AFRICAN AMERICAN: >60
GFR NON-AFRICAN AMERICAN: >60 ML/MIN/1.73
GLUCOSE BLD-MCNC: 98 MG/DL (ref 74–99)
HCT VFR BLD CALC: 43.7 % (ref 34–48)
HEMOGLOBIN: 14.5 G/DL (ref 11.5–15.5)
MCH RBC QN AUTO: 31.1 PG (ref 26–35)
MCHC RBC AUTO-ENTMCNC: 33.2 % (ref 32–34.5)
MCV RBC AUTO: 93.8 FL (ref 80–99.9)
PDW BLD-RTO: 12.6 FL (ref 11.5–15)
PLATELET # BLD: 180 E9/L (ref 130–450)
PMV BLD AUTO: 11.1 FL (ref 7–12)
POTASSIUM SERPL-SCNC: 3.4 MMOL/L (ref 3.5–5)
RBC # BLD: 4.66 E12/L (ref 3.5–5.5)
SODIUM BLD-SCNC: 143 MMOL/L (ref 132–146)
WBC # BLD: 6.9 E9/L (ref 4.5–11.5)

## 2019-05-08 PROCEDURE — 7100000010 HC PHASE II RECOVERY - FIRST 15 MIN: Performed by: OTOLARYNGOLOGY

## 2019-05-08 PROCEDURE — 69436 CREATE EARDRUM OPENING: CPT | Performed by: OTOLARYNGOLOGY

## 2019-05-08 PROCEDURE — 36415 COLL VENOUS BLD VENIPUNCTURE: CPT

## 2019-05-08 PROCEDURE — 3700000001 HC ADD 15 MINUTES (ANESTHESIA): Performed by: OTOLARYNGOLOGY

## 2019-05-08 PROCEDURE — 2580000003 HC RX 258: Performed by: OTOLARYNGOLOGY

## 2019-05-08 PROCEDURE — 7100000011 HC PHASE II RECOVERY - ADDTL 15 MIN: Performed by: OTOLARYNGOLOGY

## 2019-05-08 PROCEDURE — 3600000002 HC SURGERY LEVEL 2 BASE: Performed by: OTOLARYNGOLOGY

## 2019-05-08 PROCEDURE — 3700000000 HC ANESTHESIA ATTENDED CARE: Performed by: OTOLARYNGOLOGY

## 2019-05-08 PROCEDURE — 2780000010 HC IMPLANT OTHER: Performed by: OTOLARYNGOLOGY

## 2019-05-08 PROCEDURE — 2709999900 HC NON-CHARGEABLE SUPPLY: Performed by: OTOLARYNGOLOGY

## 2019-05-08 PROCEDURE — 6360000002 HC RX W HCPCS

## 2019-05-08 PROCEDURE — 85027 COMPLETE CBC AUTOMATED: CPT

## 2019-05-08 PROCEDURE — 3600000012 HC SURGERY LEVEL 2 ADDTL 15MIN: Performed by: OTOLARYNGOLOGY

## 2019-05-08 PROCEDURE — 6370000000 HC RX 637 (ALT 250 FOR IP): Performed by: OTOLARYNGOLOGY

## 2019-05-08 PROCEDURE — 80048 BASIC METABOLIC PNL TOTAL CA: CPT

## 2019-05-08 DEVICE — IMPLANTABLE DEVICE: Type: IMPLANTABLE DEVICE | Status: FUNCTIONAL

## 2019-05-08 RX ORDER — SODIUM CHLORIDE 0.9 % (FLUSH) 0.9 %
10 SYRINGE (ML) INJECTION PRN
Status: DISCONTINUED | OUTPATIENT
Start: 2019-05-08 | End: 2019-05-08 | Stop reason: SDUPTHER

## 2019-05-08 RX ORDER — PROPOFOL 10 MG/ML
INJECTION, EMULSION INTRAVENOUS CONTINUOUS PRN
Status: DISCONTINUED | OUTPATIENT
Start: 2019-05-08 | End: 2019-05-08 | Stop reason: SDUPTHER

## 2019-05-08 RX ORDER — SODIUM CHLORIDE 0.9 % (FLUSH) 0.9 %
10 SYRINGE (ML) INJECTION EVERY 12 HOURS SCHEDULED
Status: DISCONTINUED | OUTPATIENT
Start: 2019-05-08 | End: 2019-05-08 | Stop reason: HOSPADM

## 2019-05-08 RX ORDER — MIDAZOLAM HYDROCHLORIDE 1 MG/ML
INJECTION INTRAMUSCULAR; INTRAVENOUS PRN
Status: DISCONTINUED | OUTPATIENT
Start: 2019-05-08 | End: 2019-05-08 | Stop reason: SDUPTHER

## 2019-05-08 RX ORDER — SODIUM CHLORIDE 0.9 % (FLUSH) 0.9 %
10 SYRINGE (ML) INJECTION EVERY 12 HOURS SCHEDULED
Status: DISCONTINUED | OUTPATIENT
Start: 2019-05-08 | End: 2019-05-08 | Stop reason: SDUPTHER

## 2019-05-08 RX ORDER — LABETALOL HYDROCHLORIDE 5 MG/ML
5 INJECTION, SOLUTION INTRAVENOUS EVERY 10 MIN PRN
Status: DISCONTINUED | OUTPATIENT
Start: 2019-05-08 | End: 2019-05-08 | Stop reason: HOSPADM

## 2019-05-08 RX ORDER — PROMETHAZINE HYDROCHLORIDE 25 MG/ML
25 INJECTION, SOLUTION INTRAMUSCULAR; INTRAVENOUS PRN
Status: DISCONTINUED | OUTPATIENT
Start: 2019-05-08 | End: 2019-05-08 | Stop reason: HOSPADM

## 2019-05-08 RX ORDER — CIPROFLOXACIN AND DEXAMETHASONE 3; 1 MG/ML; MG/ML
3 SUSPENSION/ DROPS AURICULAR (OTIC) 3 TIMES DAILY
Qty: 1 BOTTLE | Refills: 3 | Status: SHIPPED | OUTPATIENT
Start: 2019-05-08 | End: 2019-05-15

## 2019-05-08 RX ORDER — OFLOXACIN 3 MG/ML
SOLUTION/ DROPS OPHTHALMIC PRN
Status: DISCONTINUED | OUTPATIENT
Start: 2019-05-08 | End: 2019-05-08 | Stop reason: ALTCHOICE

## 2019-05-08 RX ORDER — FENTANYL CITRATE 50 UG/ML
INJECTION, SOLUTION INTRAMUSCULAR; INTRAVENOUS PRN
Status: DISCONTINUED | OUTPATIENT
Start: 2019-05-08 | End: 2019-05-08 | Stop reason: SDUPTHER

## 2019-05-08 RX ORDER — MEPERIDINE HYDROCHLORIDE 50 MG/ML
12.5 INJECTION INTRAMUSCULAR; INTRAVENOUS; SUBCUTANEOUS EVERY 5 MIN PRN
Status: DISCONTINUED | OUTPATIENT
Start: 2019-05-08 | End: 2019-05-08 | Stop reason: HOSPADM

## 2019-05-08 RX ORDER — FENTANYL CITRATE 50 UG/ML
25 INJECTION, SOLUTION INTRAMUSCULAR; INTRAVENOUS EVERY 5 MIN PRN
Status: DISCONTINUED | OUTPATIENT
Start: 2019-05-08 | End: 2019-05-08 | Stop reason: HOSPADM

## 2019-05-08 RX ORDER — SODIUM CHLORIDE 0.9 % (FLUSH) 0.9 %
10 SYRINGE (ML) INJECTION PRN
Status: DISCONTINUED | OUTPATIENT
Start: 2019-05-08 | End: 2019-05-08 | Stop reason: HOSPADM

## 2019-05-08 RX ORDER — SODIUM CHLORIDE 9 MG/ML
INJECTION, SOLUTION INTRAVENOUS CONTINUOUS
Status: DISCONTINUED | OUTPATIENT
Start: 2019-05-08 | End: 2019-05-08 | Stop reason: HOSPADM

## 2019-05-08 RX ADMIN — FENTANYL CITRATE 25 MCG: 50 INJECTION, SOLUTION INTRAMUSCULAR; INTRAVENOUS at 07:38

## 2019-05-08 RX ADMIN — SODIUM CHLORIDE: 9 INJECTION, SOLUTION INTRAVENOUS at 06:01

## 2019-05-08 RX ADMIN — FENTANYL CITRATE 25 MCG: 50 INJECTION, SOLUTION INTRAMUSCULAR; INTRAVENOUS at 07:36

## 2019-05-08 RX ADMIN — PROPOFOL 70 MCG/KG/MIN: 10 INJECTION, EMULSION INTRAVENOUS at 07:36

## 2019-05-08 RX ADMIN — MIDAZOLAM HYDROCHLORIDE 2 MG: 1 INJECTION, SOLUTION INTRAMUSCULAR; INTRAVENOUS at 07:36

## 2019-05-08 ASSESSMENT — PULMONARY FUNCTION TESTS
PIF_VALUE: 1
PIF_VALUE: 0

## 2019-05-08 ASSESSMENT — PAIN SCALES - GENERAL
PAINLEVEL_OUTOF10: 0

## 2019-05-08 ASSESSMENT — PAIN - FUNCTIONAL ASSESSMENT: PAIN_FUNCTIONAL_ASSESSMENT: 0-10

## 2019-05-08 ASSESSMENT — ENCOUNTER SYMPTOMS: SHORTNESS OF BREATH: 1

## 2019-05-08 NOTE — ANESTHESIA PRE PROCEDURE
Department of Anesthesiology  Preprocedure Note       Name:  Keven Peraza   Age:  68 y.o.  :  1945                                          MRN:  [id number]         Date:  2019      Surgeon: Flakita Clements):  Jose Antonio Villanueva DO    Procedure: EVALUATION UNDER ANESTHESIA CLEAN RIGHT EAR (Right Ear)    Medications prior to admission:   Prior to Admission medications    Medication Sig Start Date End Date Taking?  Authorizing Provider   simvastatin (ZOCOR) 20 MG tablet Take 1 tablet by mouth nightly Indications: High Amount of Fats in the Blood Take 1 tablet nightly 19  Yes Shanta Hogan MD   benazepril (LOTENSIN) 40 MG tablet TAKE 1 TABLET BY MOUTH EVERY DAY FOR HIGH BLOOD PRESSURE 19  Yes Shanta Hogan MD   hydrochlorothiazide (HYDRODIURIL) 25 MG tablet TAKE 1 TABLET BY MOUTH EVERY DAY FOR HIGH BLOOD PRESSURE 19  Yes Shanta Hogan MD   SYNTHROID 112 MCG tablet TAKE 1 TABLET BY MOUTH EVERY DAY ON AN EMPTY STOMACH 19  Yes Raleigh Nunez MD   fluticasone (FLONASE) 50 MCG/ACT nasal spray 2 sprays by Nasal route daily 2 sprays in each nostril daily 19  Yes Raleigh Nunez MD   diltiazem AVILES [facility]) 360 MG extended release capsule Take 1 capsule by mouth daily 19  Yes Shanta Hogan MD   potassium chloride (KLOR-CON M10) 10 MEQ extended release tablet TAKE 1 TABLET BY MOUTH EVERY DAY 19  Yes Raleigh Nunez MD   aspirin 81 MG chewable tablet Take 1 tablet by mouth daily 18  Yes Raleigh Nunez MD   Cholecalciferol (VITAMIN D-3) 1000 units CAPS Take 2,000 Units by mouth daily 10/11/17  Yes Raleigh Nunez MD   senna (SENOKOT) 8.6 MG tablet Take 1 tablet by mouth 2 times daily 6/8/15  Yes Sabi Brock MD   docusate sodium (COLACE) 100 MG capsule Take 2 capsules by mouth 3 times daily 6/1/15  Yes Sabi rBock MD   fish oil-omega-3 fatty acids 1000 MG capsule Take 2 capsules by mouth 2 times daily. 8/16/12  Yes Lian Cadet MD   calcium-vitamin D (OSCAL-500) 500-200 MG-UNIT per tablet Take 1 tablet by mouth daily. 8/16/12  Yes Lian Cadet MD       Current medications:    Current Facility-Administered Medications   Medication Dose Route Frequency Provider Last Rate Last Dose    0.9 % sodium chloride infusion   Intravenous Continuous Herlene Lemon Pascolini,  mL/hr at 05/08/19 0601         Allergies: Allergies   Allergen Reactions    Albuterol Swelling    Atrovent Inhaler Swelling    Niacin And Related Swelling    Codeine Rash     Codeine And Related       Problem List:    Patient Active Problem List   Diagnosis Code    Hypertension I10    Hyperlipidemia E78.5    Hypothyroidism E03.9    Paroxysmal atrial fibrillation (HCC) I48.0    Constipation K59.00    Pigmented skin lesions L81.9    Vitamin D deficiency E55.9    Shortness of breath R06.02    Lesion of external ear H61.90    Obesity (BMI 30.0-34. 9) E66.9       Past Medical History:        Diagnosis Date    Atrial fibrillation ([facility] [address]     Gastritis with hemorrhage     Hyperlipidemia     Hypertension     Hypothyroidism     Obesity (BMI 30.0-34. 9) 4/17/2017    Paroxysmal atrial fibrillation [facility])        Past Surgical History:        Procedure Laterality Date    ANKLE SURGERY  11/2001    right    CARPAL TUNNEL RELEASE  1990's    bilateral    COLONOSCOPY  2/28/2008    diverticulosis internal and external hemorrhoids.  COLONOSCOPY  6/17/13    DENTAL SURGERY      EYE SURGERY      FRACTURE SURGERY Right 11/2001    ORIF right ankle    HEMORRHOID SURGERY  2004    HEMORRHOID SURGERY  6/1/15    MYRINGOTOMY AND TYMPANOSTOMY TUBE PLACEMENT Right 4/22/2015    SEPTOPLASTY  11/13/2013    and tonia ear exam    SKIN BIOPSY  07/07/2016    ear    UPPER GASTROINTESTINAL ENDOSCOPY  2/28/2008    gastric ulcer.  Dr. Herminia Draper       Social History:    Social History     Tobacco Use    Smoking status: Never Smoker    Smokeless tobacco: Never Used   Substance Use Topics    Alcohol use: No     Alcohol/week: 0.0 oz                                Counseling given: Not Answered      Vital Signs (Current):   Vitals:    05/02/19 1220 05/08/19 0541   BP:  (!) 167/71   Pulse:  70   Resp:  18   Temp:  97.3 °F (36.3 °C)   TempSrc:  Temporal   SpO2:  93%   Weight: 169 lb (76.7 kg) 169 lb (76.7 kg)   Height: 5' 2\" (1.575 m) 5' 2\" (1.575 m)                                              BP Readings from Last 3 Encounters:   05/08/19 (!) 167/71   04/24/19 (!) 160/80   01/08/19 138/72       NPO Status: Time of last liquid consumption: 2100                        Time of last solid consumption: 2100                        Date of last liquid consumption: 05/07/19                        Date of last solid food consumption: 05/07/19    BMI:   Wt Readings from Last 3 Encounters:   05/08/19 169 lb (76.7 kg)   04/24/19 169 lb (76.7 kg)   01/08/19 168 lb (76.2 kg)     Body mass index is 30.91 kg/m². CBC:   Lab Results   Component Value Date    WBC 6.8 05/24/2018    RBC 4.52 05/24/2018    HGB 13.8 05/24/2018    HCT 44.3 05/24/2018    MCV 98.0 05/24/2018    RDW 13.0 05/24/2018     05/24/2018       CMP:   Lab Results   Component Value Date     05/24/2018    K 4.2 05/24/2018     05/24/2018    CO2 29 05/24/2018    BUN 16 05/24/2018    CREATININE 0.8 05/24/2018    GFRAA >60 05/24/2018    LABGLOM >60 05/24/2018    GLUCOSE 83 05/24/2018    GLUCOSE 88 03/23/2011    PROT 7.3 05/24/2018    CALCIUM 10.0 05/24/2018    BILITOT 0.3 05/24/2018    ALKPHOS 58 05/24/2018    AST 26 05/24/2018    ALT 17 05/24/2018       POC Tests: No results for input(s): POCGLU, POCNA, POCK, POCCL, POCBUN, POCHEMO, POCHCT in the last 72 hours.     Coags:   Lab Results   Component Value Date    PROTIME 11.0 03/23/2015    INR 1.1 03/23/2015    APTT 30.5 03/23/2015       HCG (If Applicable): No results found for: PREGTESTUR, PREGSERUM, HCG, HCGQUANT     ABGs: No results found for: PHART, PO2ART, MAY1LSQ, JZL3HVY, BEART, C7FRYSXO     Type & Screen (If Applicable):  No results found for: Teresa Esposito MD     1/15/2019  9:49 AM  Following placement of an intravenous catheter 10 millicuries of   technetium 99m sestamibi was administered followed by a saline   flush. Approximately 45 minutes later resting SPECT images were   obtained. After completion of resting images a regadenoson stress   test was performed via a bolus injection of 0.4 milligrams of   regadenason followed by a saline flush. Following regadenoson   administration 30 millicuries of technetium 99m sestamibi was   injected followed by repeat post stress SPECT imaging   approximately 60 minutes post completion of administration. The   patient experienced no anginal symptoms and remained   hemodynamically stable during administration and no   electrocardiographic changes were noted. Perfusion images   pending. Anesthesia Evaluation  Patient summary reviewed no history of anesthetic complications:   Airway: Mallampati: III  TM distance: >3 FB   Neck ROM: full  Mouth opening: > = 3 FB Dental: normal exam         Pulmonary: breath sounds clear to auscultation  (+) shortness of breath:                             Cardiovascular:    (+) hypertension:, dysrhythmias (Paroxysmal atrial fibrillation):, hyperlipidemia      ECG reviewed  Rhythm: regular  Rate: normal    Stress test reviewed                Neuro/Psych:               GI/Hepatic/Renal:             Endo/Other:    (+) hypothyroidism::., .                 Abdominal:   (+) obese,         Vascular:                                      Anesthesia Plan      MAC     ASA 3       Induction: intravenous. MIPS: Postoperative opioids intended and Prophylactic antiemetics administered. Anesthetic plan and risks discussed with patient. Plan discussed with CRNA.                 Madeleine Burns MD   5/8/2019

## 2019-05-08 NOTE — H&P
Subjective:      Patient ID:  Erin Ramon is a 68 y.o. female.     HPI:     Pt presents with a history of cerumen impaction removal.   The patients ear was last cleaned 1 week ago. The patient was using ear drops to loosen wax immediately prior to thisvisit.        Hearing aids: no        Patient's medications, allergies, past medical,surgical, social and family histories were reviewed and updated as appropriate.           Review of Systems   Constitutional: Negative. HENT: Positive for congestion, postnasal drip and rhinorrhea. Negative for ear discharge, ear pain and hearing loss. Eyes: Negative. Negative for visual disturbance. Respiratory: Negative. Negative for shortness of breath. Cardiovascular: Negative. Negative for chest pain. Gastrointestinal: Negative. Negative for abdominal pain. Genitourinary: Negative. Musculoskeletal: Negative. Skin: Negative. Negative for color change. Neurological: Negative. Psychiatric/Behavioral: Negative. Negative for behavioral problems and hallucinations. All other systems reviewed and are negative.                    Objective:   Physical Exam   Constitutional: She is oriented to person, place, and time. She appears well-developed and well-nourished. HENT:   Head: Normocephalic and atraumatic. Right Ear: Tympanic membrane, external ear and ear canal normal. Decreased hearing is noted. Left Ear: Hearing, tympanic membrane, external ear and ear canal normal.   Nose: Mucosal edema and rhinorrhea present. Mouth/Throat: Uvula is midline, oropharynx is clear and moist and mucous membranes are normal.   Cerumen is really hard in the right ear today and causing the patient pain on moving it. Eyes: Pupils are equal, round, and reactive to light. Conjunctivae and EOM are normal.   Neck: Normal range of motion. Neck supple. Cardiovascular: Normal rate, regular rhythm and normal heart sounds.     Pulmonary/Chest: Effort normal and breath sounds normal.   Abdominal: Soft. Bowel sounds are normal.   Neurological: She is alert and oriented to person, place, and time. Skin: Skin is warm and dry. Nursing note and vitals reviewed.              Cerumen removal      Auditory canal(s) right ear completely obstructed with cerumen. A microscope was used due to deep impaction of thecerumen. Cerumen was not gently removed using soft plastic curette, alligator forceps. Pt could not tolerated the procedure, it hurt too much. Tympanic membranes are intact following the procedure. Auditory canals appear normal.               Assessment:        Diagnosis Orders   1.  Bilateral impacted cerumen                             Plan:      I will try to remove the obstruction in the OR     I recommend:     Evaluation of ears under anesthesia - clean cerumen impaction     RISKs  Ear Tubes     Surgical risks include:  --Hole in the Eardrum  --Cholesteatoma  --Massive bleeding from injuring a congenital dehiscence of the jugular bulb  --Hearing Loss and Vertigo

## 2019-05-09 NOTE — ANESTHESIA POSTPROCEDURE EVALUATION
Department of Anesthesiology  Postprocedure Note    Patient: Cisco Armas  MRN: [id number]  YOB: 1945  Date of evaluation: 5/9/2019  Time:  7:11 AM     Procedure Summary     Date:  05/08/19 Room / Location:  [facility] OR 04 / [facility]    Anesthesia Start:  8758 Anesthesia Stop:  1847    Procedure:  EVALUATION UNDER ANESTHESIA CLEAN RIGHT EAR (Right Ear) Diagnosis:  (ISELA IMPACTION )    Surgeon:  Zane Dos Santos DO Responsible Provider:  Reji Smart MD    Anesthesia Type:  general ASA Status:  3          Anesthesia Type: general    Patricia Phase I: Patricia Score: 10    Patricia Phase II: Patricia Score: 10    Last vitals: Reviewed and per EMR flowsheets.        Anesthesia Post Evaluation    Patient participation: complete - patient participated  Level of consciousness: awake  Airway patency: patent  Nausea & Vomiting: no nausea and no vomiting  Complications: no  Cardiovascular status: hemodynamically stable  Respiratory status: acceptable  Hydration status: stable

## 2019-05-13 PROBLEM — Z01.812 PRE-OPERATIVE LABORATORY EXAMINATION: Status: ACTIVE | Noted: 2018-04-24

## 2019-05-24 ENCOUNTER — TELEPHONE (OUTPATIENT)
Dept: ADMINISTRATIVE | Age: 74
End: 2019-05-24

## 2019-06-05 ENCOUNTER — OFFICE VISIT (OUTPATIENT)
Dept: ENT CLINIC | Age: 74
End: 2019-06-05

## 2019-06-05 VITALS
HEIGHT: 62 IN | WEIGHT: 165 LBS | DIASTOLIC BLOOD PRESSURE: 83 MMHG | SYSTOLIC BLOOD PRESSURE: 156 MMHG | BODY MASS INDEX: 30.36 KG/M2 | HEART RATE: 81 BPM

## 2019-06-05 DIAGNOSIS — H61.23 BILATERAL IMPACTED CERUMEN: Primary | ICD-10-CM

## 2019-06-05 DIAGNOSIS — H69.81 ETD (EUSTACHIAN TUBE DYSFUNCTION), RIGHT: ICD-10-CM

## 2019-06-05 PROCEDURE — 99024 POSTOP FOLLOW-UP VISIT: CPT | Performed by: OTOLARYNGOLOGY

## 2019-06-05 ASSESSMENT — ENCOUNTER SYMPTOMS
RHINORRHEA: 1
SHORTNESS OF BREATH: 0
ABDOMINAL PAIN: 0
COLOR CHANGE: 0
RESPIRATORY NEGATIVE: 1
GASTROINTESTINAL NEGATIVE: 1
EYES NEGATIVE: 1

## 2019-06-05 NOTE — PROGRESS NOTES
Subjective:      Patient ID:  Olga Morales is a 68 y.o. female. HPI Comments: Pt returns for check of ear tubes, there have not been infections since last visit. Tubes were placed 1 month ago May 2019     Patient's medications, allergies, past medical, surgical, social and family histories were reviewed and updated as appropriate. Review of Systems   Constitutional: Negative. HENT: Positive for congestion, postnasal drip and rhinorrhea. Negative for ear discharge, ear pain and hearing loss. Eyes: Negative. Negative for visual disturbance. Respiratory: Negative. Negative for shortness of breath. Cardiovascular: Negative. Negative for chest pain. Gastrointestinal: Negative. Negative for abdominal pain. Genitourinary: Negative. Musculoskeletal: Negative. Skin: Negative. Negative for color change. Neurological: Negative. Psychiatric/Behavioral: Negative. Negative for behavioral problems and hallucinations. All other systems reviewed and are negative. Objective:   Physical Exam   Constitutional: Patient appears well-developed and well-nourished. HENT:   Head: Normocephalic and atraumatic. There is normal jaw occlusion. Right Ear:   Cerumen Impaction: No  PE tube visualized: Yes   In the TM: Yes   Tube blocked: No   Drainage: No   Infection: No      Nose: Nose normal.   Mouth/Throat: Mucous membranes are moist. Dentition is normal. Oropharynx is clear. Eyes: Conjunctivae and EOM are normal. Pupils are equal, round, and reactive to light. Neck: Normal range of motion. Neck supple. Cardiovascular: Regular rhythm,    Pulmonary/Chest: Effort normal and breath sounds normal.   Abdominal: Full and soft. Musculoskeletal: Normal range of motion. Neurological: Alert. Skin: Skin is warm. Assessment:       Diagnosis Orders   1. Bilateral impacted cerumen     2.  ETD (Eustachian tube dysfunction), right                Plan:      Recheck bilateral ear tube. Follow up 4 months. Return to office earlier if there is an unresolved infection unresponsive to drops.

## 2019-06-12 PROBLEM — Z01.812 PRE-OPERATIVE LABORATORY EXAMINATION: Status: RESOLVED | Noted: 2018-04-24 | Resolved: 2019-06-12

## 2019-06-27 ENCOUNTER — OFFICE VISIT (OUTPATIENT)
Dept: CARDIOLOGY CLINIC | Age: 74
End: 2019-06-27
Payer: MEDICARE

## 2019-06-27 VITALS
DIASTOLIC BLOOD PRESSURE: 70 MMHG | WEIGHT: 169.4 LBS | RESPIRATION RATE: 18 BRPM | BODY MASS INDEX: 31.17 KG/M2 | HEART RATE: 70 BPM | HEIGHT: 62 IN | SYSTOLIC BLOOD PRESSURE: 176 MMHG

## 2019-06-27 DIAGNOSIS — I10 ESSENTIAL HYPERTENSION: ICD-10-CM

## 2019-06-27 DIAGNOSIS — E78.00 PURE HYPERCHOLESTEROLEMIA: ICD-10-CM

## 2019-06-27 DIAGNOSIS — E66.8 MODERATE OBESITY: ICD-10-CM

## 2019-06-27 DIAGNOSIS — E03.9 ACQUIRED HYPOTHYROIDISM: ICD-10-CM

## 2019-06-27 DIAGNOSIS — R06.09 EXERTIONAL DYSPNEA: Primary | ICD-10-CM

## 2019-06-27 DIAGNOSIS — E55.9 VITAMIN D DEFICIENCY: ICD-10-CM

## 2019-06-27 PROBLEM — E66.9 MODERATE OBESITY: Status: ACTIVE | Noted: 2019-06-27

## 2019-06-27 LAB
ALBUMIN SERPL-MCNC: 4.2 G/DL
ALP BLD-CCNC: 52 U/L
ALT SERPL-CCNC: 28 U/L
ANION GAP SERPL CALCULATED.3IONS-SCNC: 10 MMOL/L
AST SERPL-CCNC: 29 U/L
BASOPHILS ABSOLUTE: 0 /ΜL
BASOPHILS RELATIVE PERCENT: 0.3 %
BILIRUB SERPL-MCNC: 0.8 MG/DL (ref 0.1–1.4)
BUN BLDV-MCNC: 12 MG/DL
CALCIUM SERPL-MCNC: 9.9 MG/DL
CHLORIDE BLD-SCNC: 103 MMOL/L
CHOLESTEROL, TOTAL: 173 MG/DL
CHOLESTEROL/HDL RATIO: 4
CO2: 30 MMOL/L
CREAT SERPL-MCNC: 0.87 MG/DL
EOSINOPHILS ABSOLUTE: 0.1 /ΜL
EOSINOPHILS RELATIVE PERCENT: 1.2 %
GFR CALCULATED: >60
GLUCOSE BLD-MCNC: 94 MG/DL
HCT VFR BLD CALC: 43.3 % (ref 36–46)
HDLC SERPL-MCNC: 43 MG/DL (ref 35–70)
HEMOGLOBIN: 14.8 G/DL (ref 12–16)
LDL CHOLESTEROL CALCULATED: 98 MG/DL (ref 0–160)
LYMPHOCYTES ABSOLUTE: 1.3 /ΜL
LYMPHOCYTES RELATIVE PERCENT: 22 %
MCH RBC QN AUTO: 32 PG
MCHC RBC AUTO-ENTMCNC: 34.1 G/DL
MCV RBC AUTO: 93.8 FL
MONOCYTES ABSOLUTE: 0.4 /ΜL
MONOCYTES RELATIVE PERCENT: 6.7 %
NEUTROPHILS ABSOLUTE: 4.1 /ΜL
NEUTROPHILS RELATIVE PERCENT: 69.8 %
PDW BLD-RTO: 13.1 %
PLATELET # BLD: 159 K/ΜL
PMV BLD AUTO: 9.5 FL
POTASSIUM SERPL-SCNC: 308 MMOL/L
RBC # BLD: 4.61 10^6/ΜL
SODIUM BLD-SCNC: 143 MMOL/L
T4 TOTAL: 10
TOTAL PROTEIN: 6.9
TRIGL SERPL-MCNC: 162 MG/DL
TSH SERPL DL<=0.05 MIU/L-ACNC: 1.96 UIU/ML
VLDLC SERPL CALC-MCNC: NORMAL MG/DL
WBC # BLD: 5.9 10^3/ML

## 2019-06-27 PROCEDURE — 93000 ELECTROCARDIOGRAM COMPLETE: CPT | Performed by: INTERNAL MEDICINE

## 2019-06-27 PROCEDURE — 99204 OFFICE O/P NEW MOD 45 MIN: CPT | Performed by: INTERNAL MEDICINE

## 2019-06-27 NOTE — PROGRESS NOTES
Surgical History:  Past Surgical History:   Procedure Laterality Date    ANKLE SURGERY  2001    right    CARPAL TUNNEL RELEASE  's    bilateral    COLONOSCOPY  2008    diverticulosis internal and external hemorrhoids.  COLONOSCOPY  13    DENTAL SURGERY      EYE SURGERY      FRACTURE SURGERY Right 2001    ORIF right ankle    HEMORRHOID SURGERY  2004    HEMORRHOID SURGERY  6/1/15    MYRINGOTOMY AND TYMPANOSTOMY TUBE PLACEMENT Right 2015    MYRINGOTOMY AND TYMPANOSTOMY TUBE PLACEMENT Right 2019    EVALUATION UNDER ANESTHESIA CLEAN RIGHT EAR performed by Yoly [facility]ledo DO at [facility] SEPTOPLASTY  2013    and tonia ear exam    SKIN BIOPSY  2016    ear    UPPER GASTROINTESTINAL ENDOSCOPY  2008    gastric ulcer.  Dr. Marixa Paris       Family History:  Family History   Problem Relation Age of Onset   [facility] Cancer Mother          at 61 because of lung cancer    Heart Disease Father          at 79 secondary to MI    Diabetes Sister 77       Social History:  Social History     Socioeconomic History    Marital status:      Spouse name: Not on file    Number of children: Not on file    Years of education: Not on file    Highest education level: Not on file   Occupational History    Not on file   Social Needs    Financial resource strain: Not on file    Food insecurity:     Worry: Not on file     Inability: Not on file    Transportation needs:     Medical: Not on file     Non-medical: Not on file   Tobacco Use    Smoking status: Never Smoker    Smokeless tobacco: Never Used   Substance and Sexual Activity    Alcohol use: Not Currently     Alcohol/week: 0.0 oz    Drug use: Never    Sexual activity: Not on file   Lifestyle    Physical activity:     Days per week: Not on file     Minutes per session: Not on file    Stress: Not on file   Relationships    Social connections:     Talks on phone: Not on file     Gets together: Not on file Attends [employer] service: Not on file     Active member of club or organization: Not on file     Attends meetings of clubs or organizations: Not on file     Relationship status: Not on file    Intimate partner violence:     Fear of current or ex partner: Not on file     Emotionally abused: Not on file     Physically abused: Not on file     Forced sexual activity: Not on file   Other Topics Concern    Not on file   Social History Narrative    Drinks 2 cups of coffee daily. Allergies:   Allergies   Allergen Reactions    Albuterol Swelling    Atrovent Inhaler Swelling    Niacin And Related Swelling    Codeine Rash     Codeine And Related       Current Medications:  Current Outpatient Medications   Medication Sig Dispense Refill    Calcium Carbonate-Vit D-Min (CALTRATE PLUS PO) Take 1,200 mg by mouth 2 times daily      Coenzyme Q10 (CO Q 10) 100 MG CAPS Take 200 mg by mouth daily      simvastatin (ZOCOR) 20 MG tablet Take 1 tablet by mouth nightly Indications: High Amount of Fats in the Blood Take 1 tablet nightly 90 tablet 3    benazepril (LOTENSIN) 40 MG tablet TAKE 1 TABLET BY MOUTH EVERY DAY FOR HIGH BLOOD PRESSURE 90 tablet 3    hydrochlorothiazide (HYDRODIURIL) 25 MG tablet TAKE 1 TABLET BY MOUTH EVERY DAY FOR HIGH BLOOD PRESSURE 90 tablet 3    SYNTHROID 112 MCG tablet TAKE 1 TABLET BY MOUTH EVERY DAY ON AN EMPTY STOMACH 90 tablet 3    fluticasone (FLONASE) 50 MCG/ACT nasal spray 2 sprays by Nasal route daily 2 sprays in each nostril daily 1 Bottle 3    diltiazem (TIAZAC) 360 MG extended release capsule Take 1 capsule by mouth daily 90 capsule 1    potassium chloride (KLOR-CON M10) 10 MEQ extended release tablet TAKE 1 TABLET BY MOUTH EVERY DAY 90 tablet 1    aspirin 81 MG chewable tablet Take 1 tablet by mouth daily 90 tablet 1    Cholecalciferol (VITAMIN D-3) 1000 units CAPS Take 2,000 Units by mouth daily (Patient taking differently: Take 1,000 Units by mouth 2 times daily ) 60 capsule 5    fish oil-omega-3 fatty acids 1000 MG capsule Take 2 capsules by mouth 2 times daily. 60 capsule 6    senna (SENOKOT) 8.6 MG tablet Take 1 tablet by mouth 2 times daily 60 tablet 2    docusate sodium (COLACE) 100 MG capsule Take 2 capsules by mouth 3 times daily 90 capsule 3     No current facility-administered medications for this visit. Physical Exam:  BP (!) 176/70   Pulse 70   Resp 18   Ht 5' 2\" (1.575 m)   Wt 169 lb 6.4 oz (76.8 kg)   BMI 30.98 kg/m²   Wt Readings from Last 3 Encounters:   06/27/19 169 lb 6.4 oz (76.8 kg)   06/05/19 165 lb (74.8 kg)   05/08/19 169 lb (76.7 kg)     The patient is awake, alert and in no discomfort or distress. No gross musculoskeletal deformity or lymphadenopathy are present. No significant skin or nail changes are present. Gross examination of head, eyes, nose and throat are negative. Jugular venous pressure is normal and no carotid bruits are present. No thyromegaly is noted. Normal respiratory effort is noted with no accessory muscle usage present. Lung fields are clear to ascultation. Cardiac examination is notable for a regular rate and rhythm with no palpable thrill. No gallop rhythm or cardiac murmur are identified. A benign abdominal examination is present with no masses or organomegaly. A normal abdominal aortic pulsation is present. Intact pulses are present throughout all extremities and no peripheral edema is present. No focal neurologic deficits are present.     Laboratory Tests:  Lab Results   Component Value Date    CREATININE 0.8 05/08/2019    BUN 16 05/08/2019     05/08/2019    K 3.4 (L) 05/08/2019     05/08/2019    CO2 30 (H) 05/08/2019     No results found for: BNP  Lab Results   Component Value Date    WBC 6.9 05/08/2019    RBC 4.66 05/08/2019    HGB 14.5 05/08/2019    HCT 43.7 05/08/2019    MCV 93.8 05/08/2019    MCH 31.1 05/08/2019    MCHC 33.2 05/08/2019    RDW 12.6 05/08/2019     05/08/2019    MPV 11.1 05/08/2019 episode may have occurred in the face of an associated pneumonia. On this basis, in the absence of additional arrhythmia related symptoms or suggestive evidence of recurrent atrial fibrillation beyond this single episode reportedly occurring in excess of a decade earlier, and in spite of her [id number] risk score of 3, I have not recommended oral anticoagulation. Unless dictated by the findings of her echocardiogram, I will return her to your care would happily reassess her in the future should additional cardiovascular difficulties or concerns arise. Follow-up office visit     Thank you for allowing me to participate in your patient's care. Please feel free to contact me if you have any questions or concerns. Note: This report was completed utilizing a computerized voice recognition software. Every effort has been made to insure accuracy, however; inadvertent computerized transcription errors may be present. Beto Britt.  Nathan Barbosa, [address] [place] [address] Cardiology    An electronic copy of this consult note was forwarded to Dr. Earnestine Solis

## 2019-07-07 ASSESSMENT — ENCOUNTER SYMPTOMS
HEARTBURN: 0
VOMITING: 0
BLURRED VISION: 0
SORE THROAT: 0
BACK PAIN: 0
ABDOMINAL PAIN: 0
ORTHOPNEA: 0
SPUTUM PRODUCTION: 0
DOUBLE VISION: 0
CONSTIPATION: 0
NAUSEA: 0
COUGH: 0
SHORTNESS OF BREATH: 0
WHEEZING: 0
BLOOD IN STOOL: 0
DIARRHEA: 0

## 2019-07-07 NOTE — PROGRESS NOTES
Emily Mims is a 68 y.o. female who presents today for     Chief Complaint   Patient presents with    Hypertension     6 month follow up.  Hypothyroidism     6 month follow up. No complaints other than persistent UR congestion    Hypertension:   Emliy Mims is here for follow-up of elevated blood pressure. BP today: 132/78. She is not exercising and is adherent to a low-salt diet. Blood pressure is well controlled at home. Cardiac symptoms: none. Patient denies: chest pain, chest pressure/discomfort, claudication, dyspnea, exertional chest pressure/discomfort, fatigue, irregular heart beat, lower extremity edema, near-syncope, orthopnea, palpitations, paroxysmal nocturnal dyspnea and syncope. Cardiovascular risk factors: advanced age (older than 54 for men, 72 for women), dyslipidemia, hypertension, obesity (BMI >= 30 kg/m2) and sedentary lifestyle. Use of agents associated with hypertension: thyroid hormones. History of target organ damage: atrial fibrillation. Labs reviewed; all within desired limits      Hyperlipidemia:  Patient is here to follow up regarding  hyperlipidemia. This is  generally controlled. Treatment includes Simvastatin 20 mg/day. Patient is not compliant with lifestyle modifications. Patient is not a smoker. Most recent labs reviewed with patient today and are not remarkable. Comorbid conditions include HTN. Lab Results   Component Value Date    CHOL 173 06/27/2019    CHOL 167 05/24/2018    CHOL 180 10/05/2017     Lab Results   Component Value Date    TRIG 162 06/27/2019    TRIG 137 05/24/2018    TRIG 155 (H) 10/05/2017     Lab Results   Component Value Date    HDL 43 06/27/2019    HDL 40 05/24/2018    HDL 51 10/05/2017     Lab Results   Component Value Date    LDLCALC 98 06/27/2019    LDLCALC 100 (H) 05/24/2018    LDLCALC 98 10/05/2017     Constipation:  Long standing. Managed with stool softener, fiber capsules and fiber gummies, adequate hydration. Results   Component Value Date    HDL 43 06/27/2019    HDL 40 05/24/2018    HDL 51 10/05/2017     Lab Results   Component Value Date    LDLCALC 98 06/27/2019    LDLCALC 100 (H) 05/24/2018    [address] 98 10/05/2017       No results found for: LABA1C  Lab Results   Component Value Date    LDLCALC 98 06/27/2019    CREATININE 0.87 06/27/2019         Assessment / Plan:      Hyun Mackey was seen today for hypertension, hypothyroidism and medication refill. Diagnoses and all orders for this visit:    Essential hypertension:  Well controlled  -     benazepril (LOTENSIN) 40 MG tablet; TAKE 1 TABLET BY MOUTH EVERY DAY FOR HIGH BLOOD PRESSURE  -     hydrochlorothiazide (HYDRODIURIL) 25 MG tablet; TAKE 1 TABLET BY MOUTH EVERY DAY FOR HIGH BLOOD PRESSURE  -     diltiazem (TIAZAC) 360 MG extended release capsule; Take 1 capsule by mouth daily  -     potassium chloride (KLOR-CON M10) 10 MEQ extended release tablet; TAKE 1 TABLET BY MOUTH EVERY DAY  -     aspirin 81 MG chewable tablet; Take 1 tablet by mouth daily Indications: Paroxysmal Atrial Fibrillation  -     CBC Auto Differential; Future  -     Comprehensive Metabolic Panel; Future  -     Lipid Panel; Future  -     TSH without Reflex; Future    Mixed hyperlipidemia:  Well controlled  -     simvastatin (ZOCOR) 20 MG tablet; Take 1 tablet by mouth nightly Indications: High Amount of Fats in the Blood Take 1 tablet nightly  -     Comprehensive Metabolic Panel; Future  -     Lipid Panel; Future  -     TSH without Reflex; Future    Acquired hypothyroidism:  Well controlled  -     SYNTHROID 112 MCG tablet; TAKE 1 TABLET BY MOUTH EVERY DAY ON AN EMPTY STOMACH  -     TSH without Reflex; Future    Health care maintenance  -     [facility] DIGITAL SCREEN W CAD BILATERAL; Future    Breast cancer screening  -     [facility] DIGITAL SCREEN W CAD BILATERAL;  Future    Rhinosinusitis:  Fair controlled  -     fluticasone (FLONASE) 50 MCG/ACT nasal spray; 2 sprays by Nasal route daily 2 sprays in each nostril

## 2019-07-09 ENCOUNTER — OFFICE VISIT (OUTPATIENT)
Dept: FAMILY MEDICINE CLINIC | Age: 74
End: 2019-07-09
Payer: MEDICARE

## 2019-07-09 VITALS
TEMPERATURE: 99.3 F | DIASTOLIC BLOOD PRESSURE: 78 MMHG | HEIGHT: 62 IN | OXYGEN SATURATION: 95 % | WEIGHT: 169.8 LBS | BODY MASS INDEX: 31.25 KG/M2 | SYSTOLIC BLOOD PRESSURE: 132 MMHG | HEART RATE: 75 BPM | RESPIRATION RATE: 16 BRPM

## 2019-07-09 DIAGNOSIS — I10 ESSENTIAL HYPERTENSION: Primary | ICD-10-CM

## 2019-07-09 DIAGNOSIS — E78.2 MIXED HYPERLIPIDEMIA: ICD-10-CM

## 2019-07-09 DIAGNOSIS — J32.9 RHINOSINUSITIS: ICD-10-CM

## 2019-07-09 DIAGNOSIS — Z12.39 BREAST CANCER SCREENING: ICD-10-CM

## 2019-07-09 DIAGNOSIS — J31.0 RHINOSINUSITIS: ICD-10-CM

## 2019-07-09 DIAGNOSIS — Z00.00 HEALTH CARE MAINTENANCE: ICD-10-CM

## 2019-07-09 DIAGNOSIS — E03.9 ACQUIRED HYPOTHYROIDISM: ICD-10-CM

## 2019-07-09 DIAGNOSIS — E55.9 VITAMIN D DEFICIENCY: ICD-10-CM

## 2019-07-09 PROCEDURE — 99214 OFFICE O/P EST MOD 30 MIN: CPT | Performed by: FAMILY MEDICINE

## 2019-07-09 RX ORDER — HYDROCHLOROTHIAZIDE 25 MG/1
TABLET ORAL
Qty: 90 TABLET | Refills: 3 | Status: SHIPPED
Start: 2019-07-09 | End: 2020-06-09 | Stop reason: SDUPTHER

## 2019-07-09 RX ORDER — FLUTICASONE PROPIONATE 50 MCG
2 SPRAY, SUSPENSION (ML) NASAL DAILY
Qty: 3 BOTTLE | Refills: 3 | Status: SHIPPED
Start: 2019-07-09 | End: 2020-06-09 | Stop reason: SDUPTHER

## 2019-07-09 RX ORDER — SIMVASTATIN 20 MG
20 TABLET ORAL NIGHTLY
Qty: 90 TABLET | Refills: 3 | Status: SHIPPED
Start: 2019-07-09 | End: 2020-10-26 | Stop reason: SDUPTHER

## 2019-07-09 RX ORDER — LEVOTHYROXINE SODIUM 112 MCG
TABLET ORAL
Qty: 90 TABLET | Refills: 3 | Status: SHIPPED
Start: 2019-07-09 | End: 2020-10-26 | Stop reason: SDUPTHER

## 2019-07-09 RX ORDER — ASPIRIN 81 MG/1
81 TABLET, CHEWABLE ORAL DAILY
Qty: 90 TABLET | Refills: 3 | COMMUNITY
Start: 2019-07-09 | End: 2020-10-26 | Stop reason: SDUPTHER

## 2019-07-09 RX ORDER — POTASSIUM CHLORIDE 750 MG/1
TABLET, EXTENDED RELEASE ORAL
Qty: 90 TABLET | Refills: 3 | Status: SHIPPED
Start: 2019-07-09 | End: 2020-10-26 | Stop reason: SDUPTHER

## 2019-07-09 RX ORDER — DILTIAZEM HYDROCHLORIDE 360 MG/1
360 CAPSULE, EXTENDED RELEASE ORAL DAILY
Qty: 90 CAPSULE | Refills: 3 | Status: SHIPPED
Start: 2019-07-09 | End: 2020-06-09 | Stop reason: SDUPTHER

## 2019-07-09 RX ORDER — BENAZEPRIL HYDROCHLORIDE 40 MG/1
TABLET, FILM COATED ORAL
Qty: 90 TABLET | Refills: 3 | Status: SHIPPED
Start: 2019-07-09 | End: 2020-10-26 | Stop reason: SDUPTHER

## 2019-08-07 ENCOUNTER — HOSPITAL ENCOUNTER (OUTPATIENT)
Dept: CARDIOLOGY | Age: 74
Discharge: HOME | End: 2019-08-07
Payer: MEDICARE

## 2019-08-07 LAB
LV EF: 60 %
LVEF MODALITY: NORMAL

## 2019-08-07 PROCEDURE — 93306 TTE W/DOPPLER COMPLETE: CPT

## 2019-08-27 ENCOUNTER — TELEPHONE (OUTPATIENT)
Dept: CARDIOLOGY CLINIC | Age: 74
End: 2019-08-27

## 2019-08-28 ENCOUNTER — TELEPHONE (OUTPATIENT)
Dept: CARDIOLOGY CLINIC | Age: 74
End: 2019-08-28

## 2019-11-06 ENCOUNTER — HOSPITAL ENCOUNTER (OUTPATIENT)
Age: 74
Discharge: HOME | End: 2019-11-08
Payer: MEDICARE

## 2019-11-06 PROCEDURE — 88305 TISSUE EXAM BY PATHOLOGIST: CPT

## 2019-11-18 ENCOUNTER — HOSPITAL ENCOUNTER (OUTPATIENT)
Dept: GENERAL RADIOLOGY | Age: 74
Discharge: HOME | End: 2019-11-20
Payer: MEDICARE

## 2019-11-18 DIAGNOSIS — Z12.39 BREAST CANCER SCREENING: ICD-10-CM

## 2019-11-18 DIAGNOSIS — Z00.00 HEALTH CARE MAINTENANCE: ICD-10-CM

## 2019-11-18 PROCEDURE — 77063 BREAST TOMOSYNTHESIS BI: CPT

## 2019-11-25 ENCOUNTER — HOSPITAL ENCOUNTER (OUTPATIENT)
Age: 74
Discharge: HOME | End: 2019-11-27
Payer: MEDICARE

## 2019-11-25 PROCEDURE — 88305 TISSUE EXAM BY PATHOLOGIST: CPT

## 2019-12-05 ENCOUNTER — OFFICE VISIT (OUTPATIENT)
Dept: ENT CLINIC | Age: 74
End: 2019-12-05
Payer: MEDICARE

## 2019-12-05 VITALS
DIASTOLIC BLOOD PRESSURE: 86 MMHG | WEIGHT: 167 LBS | HEIGHT: 62 IN | SYSTOLIC BLOOD PRESSURE: 146 MMHG | HEART RATE: 74 BPM | BODY MASS INDEX: 30.73 KG/M2

## 2019-12-05 DIAGNOSIS — H61.21 IMPACTED CERUMEN OF RIGHT EAR: Primary | ICD-10-CM

## 2019-12-05 DIAGNOSIS — H69.81 EUSTACHIAN TUBE DYSFUNCTION, RIGHT: ICD-10-CM

## 2019-12-05 PROCEDURE — 69210 REMOVE IMPACTED EAR WAX UNI: CPT | Performed by: OTOLARYNGOLOGY

## 2019-12-05 PROCEDURE — 99213 OFFICE O/P EST LOW 20 MIN: CPT | Performed by: OTOLARYNGOLOGY

## 2019-12-05 ASSESSMENT — ENCOUNTER SYMPTOMS
ABDOMINAL PAIN: 0
COLOR CHANGE: 0
RHINORRHEA: 0
SHORTNESS OF BREATH: 0
RESPIRATORY NEGATIVE: 1
GASTROINTESTINAL NEGATIVE: 1
EYES NEGATIVE: 1

## 2020-05-22 LAB
ALBUMIN SERPL-MCNC: 4.4 G/DL
ALP BLD-CCNC: 60 U/L
ALT SERPL-CCNC: 30 U/L
ANION GAP SERPL CALCULATED.3IONS-SCNC: 1.47 MMOL/L
AST SERPL-CCNC: 30 U/L
BASOPHILS ABSOLUTE: 0 /ΜL
BASOPHILS RELATIVE PERCENT: 0.3 %
BILIRUB SERPL-MCNC: 0.9 MG/DL (ref 0.1–1.4)
BUN BLDV-MCNC: 15 MG/DL
CALCIUM SERPL-MCNC: 10 MG/DL
CHLORIDE BLD-SCNC: 100 MMOL/L
CHOLESTEROL, TOTAL: 163 MG/DL
CHOLESTEROL/HDL RATIO: 3.5
CO2: 29 MMOL/L
CREAT SERPL-MCNC: 0.91 MG/DL
EOSINOPHILS ABSOLUTE: 0.1 /ΜL
EOSINOPHILS RELATIVE PERCENT: 1.4 %
GFR CALCULATED: >60
GLUCOSE BLD-MCNC: 98 MG/DL
HCT VFR BLD CALC: 42.2 % (ref 36–46)
HDLC SERPL-MCNC: 47 MG/DL (ref 35–70)
HEMOGLOBIN: 14.2 G/DL (ref 12–16)
LDL CHOLESTEROL CALCULATED: 95 MG/DL (ref 0–160)
LYMPHOCYTES ABSOLUTE: 1.5 /ΜL
LYMPHOCYTES RELATIVE PERCENT: 27.9 %
MCH RBC QN AUTO: 31.5 PG
MCHC RBC AUTO-ENTMCNC: 33.6 G/DL
MCV RBC AUTO: 93.7 FL
MONOCYTES ABSOLUTE: 0.4 /ΜL
MONOCYTES RELATIVE PERCENT: 8 %
NEUTROPHILS ABSOLUTE: 3.3 /ΜL
NEUTROPHILS RELATIVE PERCENT: 62.4 %
PDW BLD-RTO: 13.2 %
PLATELET # BLD: 167 K/ΜL
PMV BLD AUTO: 9.1 FL
POTASSIUM SERPL-SCNC: 3.4 MMOL/L
RBC # BLD: 4.5 10^6/ΜL
SODIUM BLD-SCNC: 142 MMOL/L
TOTAL PROTEIN: 7.2
TRIGL SERPL-MCNC: 104 MG/DL
TSH SERPL DL<=0.05 MIU/L-ACNC: 0.95 UIU/ML
VITAMIN D 25-HYDROXY: 61.8
VITAMIN D2, 25 HYDROXY: NORMAL
VITAMIN D3,25 HYDROXY: NORMAL
VLDLC SERPL CALC-MCNC: 0 MG/DL
WBC # BLD: 5.4 10^3/ML

## 2020-06-07 RX ORDER — DILTIAZEM HYDROCHLORIDE 360 MG/1
360 CAPSULE, EXTENDED RELEASE ORAL DAILY
Qty: 90 CAPSULE | Refills: 3 | Status: CANCELLED | OUTPATIENT
Start: 2020-06-09

## 2020-06-07 RX ORDER — FLUTICASONE PROPIONATE 50 MCG
2 SPRAY, SUSPENSION (ML) NASAL DAILY
Qty: 3 BOTTLE | Refills: 3 | Status: CANCELLED | OUTPATIENT
Start: 2020-06-09

## 2020-06-07 RX ORDER — ASPIRIN 81 MG/1
81 TABLET, CHEWABLE ORAL DAILY
Qty: 90 TABLET | Refills: 3 | Status: CANCELLED | OUTPATIENT
Start: 2020-06-09

## 2020-06-07 RX ORDER — BENAZEPRIL HYDROCHLORIDE 40 MG/1
TABLET, FILM COATED ORAL
Qty: 90 TABLET | Refills: 3 | Status: CANCELLED | OUTPATIENT
Start: 2020-06-09

## 2020-06-07 RX ORDER — POTASSIUM CHLORIDE 750 MG/1
TABLET, EXTENDED RELEASE ORAL
Qty: 90 TABLET | Refills: 3 | Status: CANCELLED | OUTPATIENT
Start: 2020-06-09

## 2020-06-07 RX ORDER — LEVOTHYROXINE SODIUM 112 MCG
TABLET ORAL
Qty: 90 TABLET | Refills: 3 | Status: CANCELLED | OUTPATIENT
Start: 2020-06-09

## 2020-06-07 RX ORDER — SIMVASTATIN 20 MG
20 TABLET ORAL NIGHTLY
Qty: 90 TABLET | Refills: 3 | Status: CANCELLED | OUTPATIENT
Start: 2020-06-09

## 2020-06-07 RX ORDER — HYDROCHLOROTHIAZIDE 25 MG/1
TABLET ORAL
Qty: 90 TABLET | Refills: 3 | Status: CANCELLED | OUTPATIENT
Start: 2020-06-09

## 2020-06-07 ASSESSMENT — ENCOUNTER SYMPTOMS
CONSTIPATION: 0
SPUTUM PRODUCTION: 0
BLOOD IN STOOL: 0
DIARRHEA: 0
SORE THROAT: 0
HEARTBURN: 0
DOUBLE VISION: 0
BLURRED VISION: 0
ABDOMINAL PAIN: 0
SHORTNESS OF BREATH: 0
VOMITING: 0
NAUSEA: 0
COUGH: 0
BACK PAIN: 0
WHEEZING: 0
ORTHOPNEA: 0

## 2020-06-09 ENCOUNTER — OFFICE VISIT (OUTPATIENT)
Dept: FAMILY MEDICINE CLINIC | Age: 75
End: 2020-06-09
Payer: MEDICARE

## 2020-06-09 VITALS
SYSTOLIC BLOOD PRESSURE: 168 MMHG | TEMPERATURE: 98.5 F | RESPIRATION RATE: 22 BRPM | OXYGEN SATURATION: 98 % | DIASTOLIC BLOOD PRESSURE: 82 MMHG | HEART RATE: 75 BPM | HEIGHT: 62 IN | BODY MASS INDEX: 30.91 KG/M2 | WEIGHT: 168 LBS

## 2020-06-09 PROCEDURE — G0439 PPPS, SUBSEQ VISIT: HCPCS | Performed by: FAMILY MEDICINE

## 2020-06-09 RX ORDER — METHYLPREDNISOLONE 4 MG/1
TABLET ORAL
Qty: 21 TABLET | Refills: 0 | Status: SHIPPED | OUTPATIENT
Start: 2020-06-09 | End: 2020-06-15

## 2020-06-09 RX ORDER — HYDROCHLOROTHIAZIDE 25 MG/1
TABLET ORAL
Qty: 90 TABLET | Refills: 3 | Status: SHIPPED
Start: 2020-06-09 | End: 2020-10-26 | Stop reason: SDUPTHER

## 2020-06-09 RX ORDER — FLUTICASONE PROPIONATE 50 MCG
2 SPRAY, SUSPENSION (ML) NASAL DAILY
Qty: 3 BOTTLE | Refills: 3 | Status: SHIPPED
Start: 2020-06-09 | End: 2020-10-26 | Stop reason: SDUPTHER

## 2020-06-09 RX ORDER — DILTIAZEM HYDROCHLORIDE 360 MG/1
360 CAPSULE, EXTENDED RELEASE ORAL DAILY
Qty: 90 CAPSULE | Refills: 3 | Status: SHIPPED
Start: 2020-06-09 | End: 2020-10-26 | Stop reason: SDUPTHER

## 2020-06-09 ASSESSMENT — PATIENT HEALTH QUESTIONNAIRE - PHQ9
SUM OF ALL RESPONSES TO PHQ QUESTIONS 1-9: 2
SUM OF ALL RESPONSES TO PHQ QUESTIONS 1-9: 2

## 2020-06-24 ENCOUNTER — OFFICE VISIT (OUTPATIENT)
Dept: ENT CLINIC | Age: 75
End: 2020-06-24
Payer: MEDICARE

## 2020-06-24 VITALS — WEIGHT: 164 LBS | BODY MASS INDEX: 30.18 KG/M2 | HEIGHT: 62 IN | TEMPERATURE: 98.1 F

## 2020-06-24 PROCEDURE — 99213 OFFICE O/P EST LOW 20 MIN: CPT | Performed by: OTOLARYNGOLOGY

## 2020-06-24 PROCEDURE — 69210 REMOVE IMPACTED EAR WAX UNI: CPT | Performed by: OTOLARYNGOLOGY

## 2020-06-24 ASSESSMENT — ENCOUNTER SYMPTOMS
RESPIRATORY NEGATIVE: 1
ABDOMINAL PAIN: 0
COLOR CHANGE: 0
GASTROINTESTINAL NEGATIVE: 1
SHORTNESS OF BREATH: 0
EYES NEGATIVE: 1
RHINORRHEA: 0

## 2020-06-24 NOTE — PROGRESS NOTES
Subjective:      Patient ID:  Nick Alves is a 69 y.o. female. HPI Comments: Pt returns for check of ear tubes, there have not been infections since last visit. She had bleeding from the right ear around 1 month ago that stopped on its own. Tubes were placed in May 2019     Patient's medications, allergies, past medical, surgical, social and family histories were reviewed and updated as appropriate. Review of Systems   Constitutional: Negative. HENT: Negative for congestion, ear discharge, ear pain, hearing loss, postnasal drip and rhinorrhea. Eyes: Negative. Negative for visual disturbance. Respiratory: Negative. Negative for shortness of breath. Cardiovascular: Negative. Negative for chest pain. Gastrointestinal: Negative. Negative for abdominal pain. Genitourinary: Negative. Musculoskeletal: Negative. Skin: Negative. Negative for color change. Neurological: Negative. Psychiatric/Behavioral: Negative. Negative for behavioral problems and hallucinations. All other systems reviewed and are negative. Objective:   Physical Exam   Constitutional: Patient appears well-developed and well-nourished. HENT:   Head: Normocephalic and atraumatic. There is normal jaw occlusion. Right Ear:   Cerumen Impaction: Yes, dried blood  PE tube visualized: Yes   In the TM: Yes   Tube blocked: No   Drainage: No   Infection: No      Nose: Nose normal.   Mouth/Throat: Mucous membranes are moist. Dentition is normal. Oropharynx is clear. Eyes: Conjunctivae and EOM are normal. Pupils are equal, round, and reactive to light. Neck: Normal range of motion. Neck supple. Cardiovascular: Regular rhythm,    Pulmonary/Chest: Effort normal and breath sounds normal.   Abdominal: Full and soft. Musculoskeletal: Normal range of motion. Neurological: Alert. Skin: Skin is warm.      Cerumen Impaction Removal Procedure     Auditory canal(s) right ear completely obstructed with cerumen. A microscope was used due to deep impaction of the cerumen. Cerumen was gently removed using soft plastic curette, alligator. The T tube was in the cerumen plug Tympanic membranes are intact following the procedure. Auditory canals appear normal.          Assessment:       Diagnosis Orders   1. Impacted cerumen of right ear     2. Eustachian tube dysfunction, right                Plan:      Recheck right ear tube. Dried blood removed today. PE tube is out, TM is intact    Follow up 6 months or sooner if she would like the tube replaced. Patient seen, examined, and plan discussed with Dr. Lucinda Bello    Electronically signed by Pj Lake DO on 6/24/2020 at 2:04 PM          Elias Neville  1945    I have discussed the case, including pertinent history and exam findings with the resident. I have seen and examined the patient and the key elements of the encounter have been performed by me. I agree with the assessment, plan and orders as documented by the  resident              Remainder of medical problems as per  resident note. Patient seen and examined. Agree with above exam, assessment and plan.       Electronically signed by Tessa Wong DO on 7/7/20 at 9:00 AM EDT

## 2020-07-28 ENCOUNTER — TELEPHONE (OUTPATIENT)
Dept: ADMINISTRATIVE | Age: 75
End: 2020-07-28

## 2020-07-28 NOTE — TELEPHONE ENCOUNTER
Pt calling in to schedule an appt, she is broke out in a rash all over her body and has little bumps everywhere too. Also states that her shoulder is in pain. I offered her a v-visit and she declined. Please advise how you would want her scheduled.  And I will schedule accordingly thank you

## 2020-07-30 NOTE — TELEPHONE ENCOUNTER
It has to be an virtual or she will have to come to walk in, unless you can find an opening    Thanks

## 2020-08-10 ASSESSMENT — ENCOUNTER SYMPTOMS
WHEEZING: 0
SHORTNESS OF BREATH: 0
NAUSEA: 0
BLOOD IN STOOL: 0
COUGH: 0
CONSTIPATION: 0
ABDOMINAL PAIN: 0
VOMITING: 0
SORE THROAT: 0
DIARRHEA: 0
BACK PAIN: 0

## 2020-08-11 NOTE — PROGRESS NOTES
Marjorie Chaudhary is a 69 y.o. female who presents today for     Chief Complaint   Patient presents with    Rash     started 4 weeks ago on back and vagina looks like small pimples broke a couple open full of blood        Rash: Onset: rash on back since 11/2019; rash in perineum x 1 month. Location: as above. Injury: N/A. Description: described as \"bumps\"  When she broke open the lesions of perineum she expressed blood. Associated symptoms: Denies F/C  Medication/therapy trial: trials A & D and OTC hydrocortisone gives a little symptom relief but does not resolve lesions. Denies sexual activity. Denies changes in topical or ingested products. [address]:  Patient's past medical, surgical, social and/or family history reviewed, updated in chart, and are non-contributory (unless otherwise stated). Medications and allergies also reviewed and updated in chart. Review of Systems  Review of Systems   HENT: Negative for congestion, ear pain and sore throat. Respiratory: Negative for cough, shortness of breath and wheezing. Cardiovascular: Negative for chest pain, palpitations and leg swelling. Gastrointestinal: Negative for abdominal pain, blood in stool, constipation, diarrhea, nausea and vomiting. Genitourinary: Negative for dysuria, frequency, hematuria and urgency. Musculoskeletal: Negative for back pain, myalgias and neck pain. Skin: Negative for rash. Neurological: Negative for dizziness, weakness and headaches. Psychiatric/Behavioral: The patient is not nervous/anxious.         Physical Exam:    VS:  BP (!) 140/84   Pulse 73   Temp 96 °F (35.6 °C)   Resp 12   Ht 5' 2\" (1.575 m)   Wt 167 lb (75.8 kg)   SpO2 96%   BMI 30.54 kg/m²     LAST WEIGHT:  Wt Readings from Last 3 Encounters:   08/12/20 167 lb (75.8 kg)   06/24/20 164 lb (74.4 kg)   06/09/20 168 lb (76.2 kg)       BMI Readings from Last 3 Encounters:   08/12/20 30.54 kg/m²   06/24/20 30.00 kg/m²   06/09/20 30.73 kg/m² Physical Exam  Constitutional:       General: She is not in acute distress. Appearance: Normal appearance. She is obese. HENT:      Head: Normocephalic and atraumatic. Skin:         Neurological:      General: No focal deficit present. Mental Status: She is alert and oriented to person, place, and time. Psychiatric:         Mood and Affect: Mood normal.         Behavior: Behavior normal.         Thought Content: Thought content normal.         Judgment: Judgment normal.         Labs:  Lab Results   Component Value Date     05/22/2020    K 3.4 05/22/2020     05/22/2020    CO2 29 05/22/2020    BUN 15 05/22/2020    CREATININE 0.91 05/22/2020    PROT 7.3 05/24/2018    LABALBU 4.4 05/22/2020    LABALBU 4.8 03/23/2011    CALCIUM 10.0 05/22/2020    GFRAA >60 05/08/2019    LABGLOM >60 05/22/2020    LABGLOM >60 05/08/2019    GLUCOSE 98 05/22/2020    GLUCOSE 88 03/23/2011    AST 30 05/22/2020    ALT 30 05/22/2020    ALKPHOS 60 05/22/2020    BILITOT 0.9 05/22/2020    TSH 0.950 05/22/2020    CHOL 163 05/22/2020    TRIG 104 05/22/2020    HDL 47 05/22/2020    LDLCALC 95 05/22/2020        Lab Results   Component Value Date    CHOL 163 05/22/2020    CHOL 173 06/27/2019    CHOL 167 05/24/2018     Lab Results   Component Value Date    TRIG 104 05/22/2020    TRIG 162 06/27/2019    TRIG 137 05/24/2018     Lab Results   Component Value Date    HDL 47 05/22/2020    HDL 43 06/27/2019    HDL 40 05/24/2018     Lab Results   Component Value Date    LDLCALC 95 05/22/2020    LDLCALC 98 06/27/2019    LDLCALC 100 (H) 05/24/2018       No results found for: LABA1C  Lab Results   Component Value Date    [address] 95 05/22/2020    CREATININE 0.91 05/22/2020           Assessment / Plan:      Ariel Lyon was seen today for rash. Diagnoses and all orders for this visit:    Pityriasis rosea  -     hydrOXYzine (ATARAX) 25 MG tablet;  Take 1 tablet by mouth every 8 hours as needed for Itching    Folliculitis  -     mupirocin (BACTROBAN) 2 % ointment; Apply 3 times daily. Encounter for screening mammogram for malignant neoplasm of breast  -     [facility] DIGITAL SCREEN W OR WO CAD BILATERAL; Future          Follow Up:  Return for Keep scheduled appointment(s). or sooner if necessary. Call or go to ED immediately if symptoms worsen or persist.    Educational materials and/or home exercises printed for patient's review and were included in patient instructions on his/her AfterVisit Summary and given to patient at the end of visit. Counseled regarding above diagnosis,including possible risks and complications,  especially if left uncontrolled. Counseled regarding the possible side effects, risks, benefits and alternatives to treatment; patient and/or guardian verbalizes understanding, agrees, feels comfortable with and wishes to proceed with above treatment plan. Advised patient tocall with any new medication issues, and read all Rx info from pharmacy to assureaware of all possible risks and side effects of medication before taking. Reviewed age and gender appropriate health screening exams and vaccinations. Advisedpatient regarding importance of keeping up with recommended health maintenance andto schedule as soon as possible if overdue, as this is important in assessing forundiagnosed pathology, especially cancer, as well as protecting against potentially harmful/life threatening disease. Patient and/or guardian verbalizes understandingand agrees with above counseling, assessment and plan. All questions answered.     Marika Bautista MD

## 2020-08-12 ENCOUNTER — OFFICE VISIT (OUTPATIENT)
Dept: FAMILY MEDICINE CLINIC | Age: 75
End: 2020-08-12
Payer: MEDICARE

## 2020-08-12 VITALS
HEART RATE: 73 BPM | TEMPERATURE: 96 F | BODY MASS INDEX: 30.73 KG/M2 | SYSTOLIC BLOOD PRESSURE: 140 MMHG | DIASTOLIC BLOOD PRESSURE: 84 MMHG | OXYGEN SATURATION: 96 % | HEIGHT: 62 IN | RESPIRATION RATE: 12 BRPM | WEIGHT: 167 LBS

## 2020-08-12 PROBLEM — L73.9 FOLLICULITIS: Status: ACTIVE | Noted: 2020-08-12

## 2020-08-12 PROBLEM — L42 PITYRIASIS ROSEA: Status: ACTIVE | Noted: 2020-08-12

## 2020-08-12 PROCEDURE — 99213 OFFICE O/P EST LOW 20 MIN: CPT | Performed by: FAMILY MEDICINE

## 2020-08-12 RX ORDER — HYDROXYZINE HYDROCHLORIDE 25 MG/1
25 TABLET, FILM COATED ORAL EVERY 8 HOURS PRN
Qty: 30 TABLET | Refills: 2 | Status: SHIPPED | OUTPATIENT
Start: 2020-08-12 | End: 2020-09-11

## 2020-08-12 NOTE — PATIENT INSTRUCTIONS
Patient Education        Pityriasis Rosea: Care Instructions  Your Care Instructions     Pityriasis rosea (say \"pit-uh-RY-uh-natalio CHAN-zee-uh\") is a common skin rash. It usually starts as one scaly, reddish-pink spot on your stomach or back. Days or weeks later, more spots appear. The rash may itch, but it will not spread to other people. No one knows what causes pityriasis rosea. Some doctors believe it is a reaction to a virus. Pityriasis rosea is most common in children and young adults. It lasts 1 to 3 months and then goes away on its own. Medicine can help relieve any itching. Follow-up care is a key part of your treatment and safety. Be sure to make and go to all appointments, and call your doctor if you are having problems. It's also a good idea to know your test results and keep a list of the medicines you take. How can you care for yourself at home? · Use your medicine exactly as prescribed. Call your doctor if you have any problems with your medicine. · Expose your skin to small amounts of sunlight, but avoid sunburn. Sunlight can lessen the rash. · Use a mild soap, such as Dove or Cetaphil, when you wash your skin. · Add a handful of oatmeal (ground to a powder) to your bath. Or you can try an oatmeal bath product, such as Aveeno. Keep the water warm or lukewarm. A hot bath or shower may make the rash more visible and itchy. · Use an over-the-counter 1% hydrocortisone cream for small itchy areas. When should you call for help? Call your doctor now or seek immediate medical care if:  · You have signs of infection such as:  ? Pain, warmth, or swelling near the rash. ? Red streaks near the rash. ? Pus coming from the rash. ? A fever. Watch closely for changes in your health, and be sure to contact your doctor if:  · You see the rash on the palms of your hands or the soles of your feet. · You do not get better as expected. Where can you learn more?   Go to https://[email or web address].[email or web address] org and sign in to your [employer] account. Enter S327 in the [place] box to learn more about \"Pityriasis Rosea: Care Instructions. \"     If you do not have an account, please click on the \"Sign Up Now\" link. Current as of: October 31, 2019               Content Version: 12.5  © 2760-9998 [employer]. Care instructions adapted under license by [facility] ([facility]). If you have questions about a medical condition or this instruction, always ask your healthcare professional. [place] [address] any warranty or liability for your use of this information. Patient Education        Folliculitis: Care Instructions  Your Care Instructions     Folliculitis (say \"jot-FZM-fxt-LY-tus\") is an infection of the pouches (follicles) in the skin where hair grows. It can occur on any part of the body, but it is most common on the scalp, face, armpits, and groin. Bacteria, such as those found in a hot tub, can cause folliculitis. Folliculitis begins as a red, tender area near a strand of hair. The skin can itch or burn and may drain pus or blood. Sometimes folliculitis can lead to more serious skin infections. Your doctor usually can treat mild folliculitis with an antibiotic cream or ointment. If you have folliculitis on your scalp, you may use a shampoo that kills bacteria. Antibiotics you take as pills can treat infections deeper in the skin. For stubborn cases of folliculitis, laser treatment may be an option. Laser treatment uses strong beams of light to destroy the hair follicle. But hair will no longer grow in the treated area. Follow-up care is a key part of your treatment and safety. Be sure to make and go to all appointments, and call your doctor if you are having problems. It's also a good idea to know your test results and keep a list of the medicines you take. How can you care for yourself at home?   · Take your medicine exactly as prescribed. If your doctor prescribed antibiotics, take them as directed. Do not stop taking them just because you feel better. You need to take the full course of antibiotics. · Use a soap that kills bacteria to wash the infected area. If your scalp or beard is infected, use a shampoo with selenium or propylene glycol. Be careful. Do not scrub too long or too hard. · Mix 1 1/3 cup warm water and 1 tablespoon vinegar. Soak a cloth in the mixture, and place it over the infected skin until it cools off (usually 5 to 10 minutes). You can do this 3 to 6 times a day. · Do not share your razor, towel, or washcloth. That can spread folliculitis. · Use a new blade in your razor each time you shave to keep from re-infecting your skin. · If you tend to get folliculitis, avoid using hot tubs. They can contain bacteria that cause folliculitis. When should you call for help? Call your doctor now or seek immediate medical care if:  · You have symptoms of infection, such as:  ? Increased pain, swelling, warmth, or redness. ? Red streaks leading from the area. ? Pus draining from the area. ? A fever. Watch closely for changes in your health, and be sure to contact your doctor if:  · You do not get better as expected. Where can you learn more? Go to https://NewsWhippeStyleQeb.[employer]. org and sign in to your [employer]ls account. Enter M257 in the [facility] box to learn more about \"Folliculitis: Care Instructions. \"     If you do not have an account, please click on the \"Sign Up Now\" link. Current as of: October 31, 2019               Content Version: 12.5  © 5034-2984 [employer]. Care instructions adapted under license by [facility] ([facility]). If you have questions about a medical condition or this instruction, always ask your healthcare professional. [address] any warranty or liability for your use of this information.

## 2020-09-21 ENCOUNTER — HOSPITAL ENCOUNTER (OUTPATIENT)
Age: 75
Discharge: HOME | End: 2020-09-23
Payer: MEDICARE

## 2020-09-21 PROCEDURE — 88305 TISSUE EXAM BY PATHOLOGIST: CPT

## 2020-10-23 ASSESSMENT — ENCOUNTER SYMPTOMS
BLOOD IN STOOL: 0
WHEEZING: 0
ABDOMINAL PAIN: 0
NAUSEA: 0
SHORTNESS OF BREATH: 0
VOMITING: 0
COUGH: 0
SORE THROAT: 0
CONSTIPATION: 0
BACK PAIN: 0
DIARRHEA: 0

## 2020-10-23 NOTE — PROGRESS NOTES
with and wishes to proceed with above treatment plan. Advised patient tocall with any new medication issues, and read all Rx info from pharmacy to assureaware of all possible risks and side effects of medication before taking. Reviewed age and gender appropriate health screening exams and vaccinations. Advisedpatient regarding importance of keeping up with recommended health maintenance andto schedule as soon as possible if overdue, as this is important in assessing forundiagnosed pathology, especially cancer, as well as protecting against potentially harmful/life threatening disease. Patient and/or guardian verbalizes understandingand agrees with above counseling, assessment and plan. All questions answered.     Vasu Moralez MD

## 2020-10-26 ENCOUNTER — OFFICE VISIT (OUTPATIENT)
Dept: FAMILY MEDICINE CLINIC | Age: 75
End: 2020-10-26
Payer: MEDICARE

## 2020-10-26 VITALS
DIASTOLIC BLOOD PRESSURE: 88 MMHG | HEART RATE: 88 BPM | TEMPERATURE: 98.8 F | WEIGHT: 168.4 LBS | HEIGHT: 62 IN | OXYGEN SATURATION: 94 % | BODY MASS INDEX: 30.99 KG/M2 | RESPIRATION RATE: 16 BRPM | SYSTOLIC BLOOD PRESSURE: 138 MMHG

## 2020-10-26 PROBLEM — J31.0 RHINOSINUSITIS: Status: ACTIVE | Noted: 2020-10-26

## 2020-10-26 PROBLEM — Z76.0 ENCOUNTER FOR MEDICATION REFILL: Status: ACTIVE | Noted: 2020-10-26

## 2020-10-26 PROBLEM — J32.9 RHINOSINUSITIS: Status: ACTIVE | Noted: 2020-10-26

## 2020-10-26 PROCEDURE — 99214 OFFICE O/P EST MOD 30 MIN: CPT | Performed by: FAMILY MEDICINE

## 2020-10-26 RX ORDER — DILTIAZEM HYDROCHLORIDE 360 MG/1
360 CAPSULE, EXTENDED RELEASE ORAL DAILY
Qty: 90 CAPSULE | Refills: 3 | Status: SHIPPED
Start: 2020-10-26 | End: 2021-06-14 | Stop reason: SDUPTHER

## 2020-10-26 RX ORDER — HYDROCHLOROTHIAZIDE 25 MG/1
TABLET ORAL
Qty: 90 TABLET | Refills: 3 | Status: SHIPPED
Start: 2020-10-26 | End: 2021-06-14 | Stop reason: SDUPTHER

## 2020-10-26 RX ORDER — ASPIRIN 81 MG/1
81 TABLET, CHEWABLE ORAL DAILY
Qty: 90 TABLET | Refills: 3 | Status: SHIPPED
Start: 2020-10-26 | End: 2021-06-14 | Stop reason: SDUPTHER

## 2020-10-26 RX ORDER — LEVOTHYROXINE SODIUM 112 MCG
TABLET ORAL
Qty: 90 TABLET | Refills: 3 | Status: SHIPPED
Start: 2020-10-26 | End: 2021-06-14 | Stop reason: SDUPTHER

## 2020-10-26 RX ORDER — SIMVASTATIN 20 MG
20 TABLET ORAL NIGHTLY
Qty: 90 TABLET | Refills: 3 | Status: SHIPPED
Start: 2020-10-26 | End: 2021-06-14 | Stop reason: SDUPTHER

## 2020-10-26 RX ORDER — BENAZEPRIL HYDROCHLORIDE 40 MG/1
TABLET, FILM COATED ORAL
Qty: 90 TABLET | Refills: 3 | Status: SHIPPED
Start: 2020-10-26 | End: 2021-06-14 | Stop reason: SDUPTHER

## 2020-10-26 RX ORDER — POTASSIUM CHLORIDE 750 MG/1
TABLET, EXTENDED RELEASE ORAL
Qty: 90 TABLET | Refills: 3 | Status: SHIPPED
Start: 2020-10-26 | End: 2021-06-14 | Stop reason: SDUPTHER

## 2020-10-26 RX ORDER — FLUTICASONE PROPIONATE 50 MCG
2 SPRAY, SUSPENSION (ML) NASAL DAILY
Qty: 3 BOTTLE | Refills: 3 | Status: SHIPPED
Start: 2020-10-26 | End: 2021-06-14 | Stop reason: SDUPTHER

## 2020-10-26 ASSESSMENT — ENCOUNTER SYMPTOMS
COUGH: 0
NAUSEA: 0
ABDOMINAL PAIN: 0
SINUS PAIN: 0
ORTHOPNEA: 0
CONSTIPATION: 0
WHEEZING: 0
BACK PAIN: 0
SORE THROAT: 0
BLOOD IN STOOL: 0
SHORTNESS OF BREATH: 0
BLURRED VISION: 0
DIARRHEA: 0
HEARTBURN: 0
DOUBLE VISION: 0
VOMITING: 0
SPUTUM PRODUCTION: 0

## 2020-10-26 NOTE — PATIENT INSTRUCTIONS
Patient Education        Shoulder Pain: Care Instructions  Your Care Instructions     You can hurt your shoulder by using it too much during an activity, such as fishing or baseball. It can also happen as part of the everyday wear and tear of getting older. Shoulder injuries can be slow to heal, but your shoulder should get better with time. Your doctor may recommend a sling to rest your shoulder. If you have injured your shoulder, you may need testing and treatment. Follow-up care is a key part of your treatment and safety. Be sure to make and go to all appointments, and call your doctor if you are having problems. It's also a good idea to know your test results and keep a list of the medicines you take. How can you care for yourself at home? · Take pain medicines exactly as directed. ? If the doctor gave you a prescription medicine for pain, take it as prescribed. ? If you are not taking a prescription pain medicine, ask your doctor if you can take an over-the-counter medicine. ? Do not take two or more pain medicines at the same time unless the doctor told you to. Many pain medicines contain acetaminophen, which is Tylenol. Too much acetaminophen (Tylenol) can be harmful. · If your doctor recommends that you wear a sling, use it as directed. Do not take it off before your doctor tells you to. · Put ice or a cold pack on the sore area for 10 to 20 minutes at a time. Put a thin cloth between the ice and your skin. · If there is no swelling, you can put moist heat, a heating pad, or a warm cloth on your shoulder. Some doctors suggest alternating between hot and cold. · Rest your shoulder for a few days. If your doctor recommends it, you can then begin gentle exercise of the shoulder, but do not lift anything heavy. When should you call for help? Call 911 anytime you think you may need emergency care. For example, call if:    · You have chest pain or pressure.  This may occur with:  ? Sweating. ? Shortness of breath. ? Nausea or vomiting. ? Pain that spreads from the chest to the neck, jaw, or one or both shoulders or arms. ? Dizziness or lightheadedness. ? A fast or uneven pulse. After calling 911, chew 1 adult-strength aspirin. Wait for an ambulance. Do not try to drive yourself.     · Your arm or hand is cool or pale or changes color. Call your doctor now or seek immediate medical care if:    · You have signs of infection, such as:  ? Increased pain, swelling, warmth, or redness in your shoulder. ? Red streaks leading from a place on your shoulder. ? Pus draining from an area of your shoulder. ? Swollen lymph nodes in your neck, armpits, or groin. ? A fever. Watch closely for changes in your health, and be sure to contact your doctor if:    · You cannot use your shoulder.     · Your shoulder does not get better as expected. Where can you learn more? Go to https://ImageWare Systems.Protean Payment. org and sign in to your PneumRx account. Enter P905 in the FIGMD box to learn more about \"Shoulder Pain: Care Instructions. \"     If you do not have an account, please click on the \"Sign Up Now\" link. Current as of: March 2, 2020               Content Version: 12.6  © 8538-6629 Healthwise, Incorporated. Care instructions adapted under license by [facility] If you have questions about a medical condition or this instruction, always ask your healthcare professional. [place] [address] any warranty or liability for your use of this information. Patient Education        Shoulder Stretches: Exercises  Introduction  Here are some examples of exercises for you to try. The exercises may be suggested for a condition or for rehabilitation. Start each exercise slowly. Ease off the exercises if you start to have pain. You will be told when to start these exercises and which ones will work best for you.   How to do the exercises  Shoulder stretch 1.  a doorway and place one arm against the door frame. Your elbow should be a little higher than your shoulder. 2. Relax your shoulders as you lean forward, allowing your chest and shoulder muscles to stretch. You can also turn your body slightly away from your arm to stretch the muscles even more. 3. Hold for 15 to 30 seconds. 4. Repeat 2 to 4 times with each arm. Shoulder and chest stretch   1. Shoulder and chest stretch  2. While sitting, relax your upper body so you slump slightly in your chair. 3. As you breathe in, straighten your back and open your arms out to the sides. 4. Gently pull your shoulder blades back and downward. 5. Hold for 15 to 30 seconds as your breathe normally. 6. Repeat 2 to 4 times. Overhead stretch   1. Reach up over your head with both arms. 2. Hold for 15 to 30 seconds. 3. Repeat 2 to 4 times. Follow-up care is a key part of your treatment and safety. Be sure to make and go to all appointments, and call your doctor if you are having problems. It's also a good idea to know your test results and keep a list of the medicines you take. Where can you learn more? Go to https://DeposcopeZootRock.[email or web address] org and sign in to your [employer] account. Enter S254 in the Embedster box to learn more about \"Shoulder Stretches: Exercises. \"     If you do not have an account, please click on the \"Sign Up Now\" link. Current as of: March 2, 2020               Content Version: 12.6  © 2006-2020 [employer], Incorporated. Care instructions adapted under license by [facility] ([facility]). If you have questions about a medical condition or this instruction, always ask your healthcare professional. [place] [address] any warranty or liability for your use of this information. Patient Education        Rhomboid Muscle Strain: Rehab Exercises  Introduction  Here are some examples of exercises for you to try.  The exercises may be suggested for a condition of the medicines you take. Where can you learn more? Go to https://chpepiceweb.Studio [email or web address] org and sign in to your Vaultize account. Enter U227 in the Netshow.mehire box to learn more about \"Rhomboid Muscle Strain: Rehab Exercises. \"     If you do not have an account, please click on the \"Sign Up Now\" link. Current as of: March 2, 2020               Content Version: 12.6  © 2006-2020 barcoo. Care instructions adapted under license by [facility] ([facility]). If you have questions about a medical condition or this instruction, always ask your healthcare professional. [address] any warranty or liability for your use of this information. Patient Education        Dry Skin: Care Instructions  Your Care Instructions  Dry skin is a common problem, especially in areas where the air is very dry. Dry skin can also become a problem as you get older and lose natural oils that keep your skin moist.  A tendency toward dry, itchy skin may run in families. Some problems with the body's defenses (immune system), allergies, or an infection with a fungus may also cause patches of dry skin. An over-the-counter cream may help your dry skin. If your skin problem does not get better with home treatment, your doctor may prescribe ointment. You may need antibiotics if you have a skin infection. Follow-up care is a key part of your treatment and safety. Be sure to make and go to all appointments, and call your doctor if you are having problems. It's also a good idea to know your test results and keep a list of the medicines you take. How can you care for yourself at home? Showers and baths  · Keep showers and baths short, and use warm or lukewarm water. Don't use hot water. It takes off more of your skin's natural oils. · Use as little soap as you can. Choose a mild soap, such as Dove, Cetaphil, or Neutrogena. Or use a skin cleanser like Aquanil or Cetaphil.   · If you are taking a bath, use soap only at the very end. Then rinse off all traces of soap with fresh water. Gently pat your skin dry with a towel. Skin creams and moisturizers  · Apply moisturizer or skin cream right away (within 3 minutes) after a bath or shower. Use a moisturizer at other times too, as often as you need it. · Moisturizing creams are better than lotions. Try brands like CeraVe cream, Cetaphil cream, or Eucerin cream.  Other tips  · When washing clothes, use a small amount of detergent. Don't use fabric softeners or dryer sheets. · For small areas of itchy skin, try an over-the-counter 1% hydrocortisone cream.  · If you have very dry hands, spread petroleum jelly (such as Vaseline) on your hands before bed. Wear thin cotton gloves while you sleep. If your feet are dry, spread Vaseline on them and wear socks while you sleep. When should you call for help? Call your doctor now or seek immediate medical care if:    · You have signs of infection, such as:  ? Pain, warmth, or swelling in the skin. ? Red streaks near a wound in the skin. ? Pus coming from a wound in your skin. ? A fever. Watch closely for changes in your health, and be sure to contact your doctor if:    · You do not get better as expected. Where can you learn more? Go to https://Poll Everywherepekateeweb.Biosensia. org and sign in to your Velotton account. Enter E656 in the [facility] box to learn more about \"Dry Skin: Care Instructions. \"     If you do not have an account, please click on the \"Sign Up Now\" link. Current as of: July 2, 2020               Content Version: 12.6  © 7101-0933 Trad[employer] Inc[employer] MIOTtech. Care instructions adapted under license by [facility] ([facility]). If you have questions about a medical condition or this instruction, always ask your healthcare professional. [address] any warranty or liability for your use of this information.

## 2020-10-26 NOTE — PROGRESS NOTES
Saeed Bello is a 77 y.o. female who presents today for     Chief Complaint   Patient presents with    Shoulder Pain     left,x 2 months, getting worse. taking no medications    Rash     rash on back x months, using no medication, bumpy    Medication Refill     In addition to being in need of all medication refills, she complains of chronic left shoulder pain and persistent itching from dermatitis      Shoulder pain:  Patient reports that she is currently undergoing left shoulder pain. She reports that the pain started last year in May, when she purchased a new mattress. She reports that the pain is a sharp and occasionally throbbing. Reports that the pain today is a 4/10. Has not tried anything to alleviate the pain. Aggravated by nothing. Reports that the pain is intermittent. Because it is chronic, she is concerned about the potential for an underlying condition and inquired about getting an x-ray     Rash/Dermatitis:  Patient also reports itching and a rash to her mid to left upper back, and that the rash and itching began together. She reports that they began approximately 1 year ago. Reports that the itching is a 10/10 severity. Has not tried anything to alleviate and not aggravated by anything. Denies utilizing any new products or any changes at the time. Trial of PO Medrol Dosepak last year was ineffective. Because of its location, she reports that she will be unable to apply anything topical.  She also reports that she sees Dr. Raven Mendez (Dermatoligy) on a regular basis; she has an appointment with her 11/11. She was advised to discuss the presence of dermatitis and possible management with heart that time      Hypertension:   Saeed Bello is here for follow-up of elevated blood pressure. BP today: 162/80. She is not exercising and is adherent to a low-salt diet. Blood pressure is well controlled at home. Cardiac symptoms: none.  Patient denies: chest pain, chest pressure/discomfort, claudication, dyspnea, exertional chest pressure/discomfort, fatigue, irregular heart beat, lower extremity edema, near-syncope, orthopnea, palpitations, paroxysmal nocturnal dyspnea and syncope. Cardiovascular risk factors: advanced age (older than 54 for men, 72 for women), dyslipidemia, hypertension, obesity (BMI >= 30 kg/m2) and sedentary lifestyle. Use of agents associated with hypertension: thyroid hormones. History of target organ damage: atrial fibrillation. Labs reviewed; all within desired limits      Hyperlipidemia:  Patient is here to follow up regarding  hyperlipidemia. This is  generally controlled. Treatment includes Simvastatin 20 mg/day. Patient is not compliant with lifestyle modifications. Patient is not a smoker. Most recent labs reviewed with patient today and are not remarkable. Comorbid conditions include HTN. Lab Results   Component Value Date    CHOL 163 05/22/2020    CHOL 173 06/27/2019    CHOL 167 05/24/2018     Lab Results   Component Value Date    TRIG 104 05/22/2020    TRIG 162 06/27/2019    TRIG 137 05/24/2018     Lab Results   Component Value Date    HDL 47 05/22/2020    HDL 43 06/27/2019    HDL 40 05/24/2018     Lab Results   Component Value Date    LDLCALC 95 05/22/2020    LDLCALC 98 06/27/2019    LDLCALC 100 (H) 05/24/2018     Constipation:  Long standing. Managed with stool softener, fiber capsules and fiber gummies, adequate hydration. However, symptoms are worse now that diltiazem dosage was increased. Health Maintenance:  Hilda Zendejas is [facility]    [address] [facility] [address]:  Patient's past medical, surgical, social and/or family history reviewed, updated in chart, and are non-contributory (unless otherwise stated). Medications and allergies also reviewed and updated in chart. Review of Systems  Review of Systems   Constitutional: Negative for chills, fever, malaise/fatigue and weight loss. HENT: Negative for congestion, ear pain, sinus pain, sore throat and tinnitus. Persistent congestion of posterior nasal passages and sinuses. Reports control of symptoms with regular use of Flonase NS   Eyes: Negative for blurred vision and double vision. Respiratory: Negative for cough, sputum production, shortness of breath and wheezing. Cardiovascular: Negative for chest pain, palpitations, orthopnea and leg swelling. Gastrointestinal: Negative for abdominal pain, blood in stool, constipation, diarrhea, heartburn, nausea and vomiting. Genitourinary: Negative for dysuria, frequency, hematuria and urgency. Musculoskeletal: Positive for joint pain. Negative for back pain, myalgias and neck pain. Left shoulder pain     Skin: Positive for rash. Negative for itching. Rash to mid-left upper back   Neurological: Negative for dizziness, tingling, tremors, focal weakness, weakness and headaches. Endo/Heme/Allergies: Does not bruise/bleed easily. Psychiatric/Behavioral: Negative for depression, memory loss and substance abuse. The patient is not nervous/anxious and does not have insomnia. Physical Exam:    VS:    /88 (Site: Right Upper Arm, Position: Sitting, Cuff Size: Medium Adult) Comment: Deep breathing and proper BP technique  Pulse 88   Temp 98.8 °F (37.1 °C) (Oral)   Resp 16   Ht 5' 2\" (1.575 m)   Wt 168 lb 6.4 oz (76.4 kg)   SpO2 94%   BMI 30.80 kg/m²   LAST WEIGHT:  Wt Readings from Last 3 Encounters:   10/26/20 168 lb 6.4 oz (76.4 kg)   08/12/20 167 lb (75.8 kg)   06/24/20 164 lb (74.4 kg)     Physical Exam   Constitutional: She is oriented to person, place, and time. She appears well-developed and well-nourished. No distress. HENT:   Head: Normocephalic and atraumatic. Right Ear: External ear normal.   Left Ear: External ear normal.   Mouth/Throat: Oropharynx is clear and moist. No oropharyngeal exudate. Eyes: Pupils are equal, round, and reactive to light. Conjunctivae and EOM are normal. Right eye exhibits no discharge.  No scleral icterus. Neck: Normal range of motion. Neck supple. No thyromegaly present. Cardiovascular: Normal rate, regular rhythm, normal heart sounds and intact distal pulses. No murmur heard. Pulmonary/Chest: Effort normal. No stridor. No respiratory distress. She has no wheezes. She has no rales. She exhibits no tenderness. Abdominal: Soft. Bowel sounds are normal. She exhibits no distension and no mass. There is no abdominal tenderness. There is no guarding. Musculoskeletal: Normal range of motion. General: No tenderness or edema. Lymphadenopathy:     She has no cervical adenopathy. Neurological: She is alert and oriented to person, place, and time. Skin: Skin is warm and dry. Rash noted. She is not diaphoretic. No erythema. No pallor. Psychiatric: She has a normal mood and affect.  Her behavior is normal. Thought content normal.       Labs:  Lab Results   Component Value Date     05/22/2020    K 3.4 05/22/2020     05/22/2020    CO2 29 05/22/2020    BUN 15 05/22/2020    CREATININE 0.91 05/22/2020    PROT 7.3 05/24/2018    LABALBU 4.4 05/22/2020    LABALBU 4.8 03/23/2011    CALCIUM 10.0 05/22/2020    GFRAA >60 05/08/2019    LABGLOM >60 05/22/2020    LABGLOM >60 05/08/2019    GLUCOSE 98 05/22/2020    GLUCOSE 88 03/23/2011    AST 30 05/22/2020    ALT 30 05/22/2020    ALKPHOS 60 05/22/2020    BILITOT 0.9 05/22/2020    TSH 0.950 05/22/2020    CHOL 163 05/22/2020    TRIG 104 05/22/2020    HDL 47 05/22/2020    LDLCALC 95 05/22/2020        Lab Results   Component Value Date    CHOL 163 05/22/2020    CHOL 173 06/27/2019    CHOL 167 05/24/2018     Lab Results   Component Value Date    TRIG 104 05/22/2020    TRIG 162 06/27/2019    TRIG 137 05/24/2018     Lab Results   Component Value Date    HDL 47 05/22/2020    HDL 43 06/27/2019    HDL 40 05/24/2018     Lab Results   Component Value Date    LDLCALC 95 05/22/2020    LDLCALC 98 06/27/2019    LDLCALC 100 (H) 05/24/2018       No results found for: LABA1C  Lab Results   Component Value Date    [address] 05/22/2020    CREATININE 0.91 05/22/2020           Assessment / Plan:      Flynn Kent was seen today for shoulder pain, rash and medication refill. Diagnoses and all orders for this visit:    Encounter for medication refill  -     aspirin 81 MG chewable tablet; Take 1 tablet by mouth daily Indications: Paroxysmal Atrial Fibrillation  -     potassium chloride (KLOR-CON M10) 10 MEQ extended release tablet; TAKE 1 TABLET BY MOUTH EVERY DAY  -     SYNTHROID 112 MCG tablet; TAKE 1 TABLET BY MOUTH EVERY DAY ON AN EMPTY STOMACH  -     benazepril (LOTENSIN) 40 MG tablet; TAKE 1 TABLET BY MOUTH EVERY DAY FOR HIGH BLOOD PRESSURE  -     simvastatin (ZOCOR) 20 MG tablet; Take 1 tablet by mouth nightly Indications: High Amount of Fats in the Blood Take 1 tablet nightly  -     hydroCHLOROthiazide (HYDRODIURIL) 25 MG tablet; TAKE 1 TABLET BY MOUTH EVERY DAY FOR HIGH BLOOD PRESSURE  -     fluticasone (FLONASE) 50 MCG/ACT nasal spray; 2 sprays by Nasal route daily 2 sprays in each nostril daily  -     dilTIAZem (TIAZAC) 360 MG extended release capsule; Take 1 capsule by mouth daily    Chronic left shoulder pain: patient is concerned about the potential for an underlying problem causing persistent pain  -     XR CHEST (2 VW); Future  -     XR SHOULDER LEFT (MIN 2 VIEWS); Future    Essential hypertension:  Stable. Med refill  -     aspirin 81 MG chewable tablet; Take 1 tablet by mouth daily Indications: Paroxysmal Atrial Fibrillation  -     potassium chloride (KLOR-CON M10) 10 MEQ extended release tablet; TAKE 1 TABLET BY MOUTH EVERY DAY  -     benazepril (LOTENSIN) 40 MG tablet; TAKE 1 TABLET BY MOUTH EVERY DAY FOR HIGH BLOOD PRESSURE  -     hydroCHLOROthiazide (HYDRODIURIL) 25 MG tablet; TAKE 1 TABLET BY MOUTH EVERY DAY FOR HIGH BLOOD PRESSURE  -     dilTIAZem (TIAZAC) 360 MG extended release capsule; Take 1 capsule by mouth daily    Acquired hypothyroidism:  Stable. Med refill  -     SYNTHROID 112 MCG tablet; TAKE 1 TABLET BY MOUTH EVERY DAY ON AN EMPTY STOMACH    Mixed hyperlipidemia:  Stable. Med refill  -     simvastatin (ZOCOR) 20 MG tablet; Take 1 tablet by mouth nightly Indications: High Amount of Fats in the Blood Take 1 tablet nightly    Rhinosinusitis:  Stable. Med refill  -     fluticasone (FLONASE) 50 MCG/ACT nasal spray; 2 sprays by Nasal route daily 2 sprays in each nostril daily    Dermatitis: Persistent  Patient advised to discuss ongoing S/S with her dermatologist Dr. Hoover Carry         Call or go to ED immediately if symptoms worsen or persist.    Follow Up:  Return for F/U based on test outcome, Keep scheduled appointment(s). , or sooner if necessary. Educational materials and/or home exercises printed for patient's review and were included in patient instructions on his/her After Visit Summary and given to patient at the end of visit. Counseled regarding above diagnosis, including possible risks and complications,  especially if left uncontrolled. Counseled regarding the possible side effects, risks, benefits and alternatives to treatment; patient and/or guardian verbalizes understanding, agrees, feels comfortable with and wishes to proceed with above treatment plan. Advised patient to call with any new medication issues, and read all Rx info from pharmacy to assure aware of all possible risks and side effects of medication before taking. Reviewed age and gender appropriate health screening exams and vaccinations. Advised patient regarding importance of keeping up with recommended health maintenance and to schedule as soon as possible if overdue, as this is important in assessing for undiagnosed pathology, especially cancer, as well as protecting against potentially harmful/life threatening disease. Patient and/or guardian verbalizes understanding and agrees with above counseling, assessment and plan.     All questions answered.     Breezy Leiva MD

## 2020-11-19 ENCOUNTER — HOSPITAL ENCOUNTER (OUTPATIENT)
Dept: GENERAL RADIOLOGY | Age: 75
Discharge: HOME | End: 2020-11-21
Payer: MEDICARE

## 2020-11-19 PROCEDURE — 77063 BREAST TOMOSYNTHESIS BI: CPT

## 2021-01-20 ENCOUNTER — OFFICE VISIT (OUTPATIENT)
Dept: PRIMARY CARE CLINIC | Age: 76
End: 2021-01-20
Payer: MEDICARE

## 2021-01-20 VITALS
SYSTOLIC BLOOD PRESSURE: 148 MMHG | OXYGEN SATURATION: 98 % | WEIGHT: 160 LBS | HEIGHT: 62 IN | TEMPERATURE: 99.1 F | BODY MASS INDEX: 29.44 KG/M2 | HEART RATE: 72 BPM | DIASTOLIC BLOOD PRESSURE: 86 MMHG

## 2021-01-20 DIAGNOSIS — R43.2 AGEUSIA: ICD-10-CM

## 2021-01-20 DIAGNOSIS — Z20.822 SUSPECTED COVID-19 VIRUS INFECTION: ICD-10-CM

## 2021-01-20 DIAGNOSIS — U07.1 COVID-19 VIRUS INFECTION: Primary | ICD-10-CM

## 2021-01-20 DIAGNOSIS — R68.2 DRY MOUTH: ICD-10-CM

## 2021-01-20 DIAGNOSIS — R43.0 ANOSMIA: ICD-10-CM

## 2021-01-20 DIAGNOSIS — R42 DIZZINESS: ICD-10-CM

## 2021-01-20 LAB
Lab: ABNORMAL
QC PASS/FAIL: ABNORMAL
SARS-COV-2, POC: DETECTED

## 2021-01-20 PROCEDURE — 99213 OFFICE O/P EST LOW 20 MIN: CPT | Performed by: NURSE PRACTITIONER

## 2021-01-20 PROCEDURE — 87426 SARSCOV CORONAVIRUS AG IA: CPT | Performed by: NURSE PRACTITIONER

## 2021-01-20 RX ORDER — PREDNISONE 10 MG/1
10 TABLET ORAL 2 TIMES DAILY
Qty: 10 TABLET | Refills: 0 | Status: SHIPPED | OUTPATIENT
Start: 2021-01-20 | End: 2021-01-25

## 2021-01-20 RX ORDER — AZITHROMYCIN 250 MG/1
250 TABLET, FILM COATED ORAL SEE ADMIN INSTRUCTIONS
Qty: 6 TABLET | Refills: 0 | Status: SHIPPED | OUTPATIENT
Start: 2021-01-20 | End: 2021-01-25

## 2021-01-20 NOTE — PATIENT INSTRUCTIONS
Patient Education        Learning About Coronavirus [phone])  What is coronavirus (COVID-19)? COVID-19 is a disease caused by a new type of coronavirus. This illness was first found in December 2019. It has since spread worldwide. Coronaviruses are a large group of viruses. They cause the common cold. They also cause more serious illnesses like [place] respiratory syndrome (MERS) and severe acute respiratory syndrome (SARS). COVID-19 is caused by a novel coronavirus. That means it's a new type that has not been seen in people before. What are the symptoms? Coronavirus (COVID-19) symptoms may include:  · Fever. · Cough. · Trouble breathing. · Chills or repeated shaking with chills. · Muscle pain. · Headache. · Sore throat. · New loss of taste or smell. · Vomiting. · Diarrhea. In severe cases, COVID-19 can cause pneumonia and make it hard to breathe without help from a machine. It can cause death. How is it diagnosed? COVID-19 is diagnosed with a viral test. This may also be called a PCR test or antigen test. It looks for evidence of the virus in your breathing passages or lungs (respiratory system). The test is most often done on a sample from the nose, throat, or lungs. It's sometimes done on a sample of saliva. One way a sample is collected is by putting a long swab into the back of your nose. How is it treated? Mild cases of COVID-19 can be treated at home. Serious cases need treatment in the hospital. Treatment may include medicines to reduce symptoms, plus breathing support such as oxygen therapy or a ventilator. Some people may be placed on their belly to help their oxygen levels. Treatments that may help people who have COVID-19 include:  Antiviral medicines. These medicines treat viral infections. Remdesivir is an example. Immune-based therapy. These medicines help the immune system fight COVID-19. One example is bamlanivimab. It's a monoclonal antibody. Blood thinners. These medicines help prevent blood clots. People with severe illness are at risk for blood clots. How can you protect yourself and others? The best way to protect yourself from getting sick is to:  · Avoid areas where there is an outbreak. · Avoid contact with people who may be infected. · Avoid crowds and try to stay at least 6 feet away from other people. · Wash your hands often, especially after you cough or sneeze. Use soap and water, and scrub for at least 20 seconds. If soap and water aren't available, use an alcohol-based hand . · Avoid touching your mouth, nose, and eyes. To help avoid spreading the virus to others:  · Stay home if you are sick or have been exposed to the virus. Don't go to school, work, or public areas. And don't use public transportation, ride-shares, or taxis unless you have no choice. · Wear a cloth face cover if you have to go to public areas. · Cover your mouth with a tissue when you cough or sneeze. Then throw the tissue in the trash and wash your hands right away. · If you're sick:  ? Leave your home only if you need to get medical care. But call the doctor's office first so they know you're coming. And wear a face cover. ? Wear the face cover whenever you're around other people. It can help stop the spread of the virus when you cough or sneeze. ? Limit contact with pets and people in your home. If possible, stay in a separate bedroom and use a separate bathroom. ? Clean and disinfect your home every day. Use household  and disinfectant wipes or sprays. Take special care to clean things that you grab with your hands. These include doorknobs, remote controls, phones, and handles on your refrigerator and microwave. And don't forget countertops, tabletops, bathrooms, and computer keyboards. When should you call for help? Call 911 anytime you think you may need emergency care.  For example, call if you have life-threatening symptoms, such as:    · You have severe trouble breathing. (You can't talk at all.)     · You have constant chest pain or pressure.     · You are severely dizzy or lightheaded.     · You are confused or can't think clearly.     · Your face and lips have a blue color.     · You pass out (lose consciousness) or are very hard to wake up. Call your doctor now or seek immediate medical care if:    · You have moderate trouble breathing. (You can't speak a full sentence.)     · You are coughing up blood (more than about 1 teaspoon).     · You have signs of low blood pressure. These include feeling lightheaded; being too weak to stand; and having cold, pale, clammy skin. Watch closely for changes in your health, and be sure to contact your doctor if:    · Your symptoms get worse.     · You are not getting better as expected. Call before you go to the doctor's office. Follow their instructions. And wear a cloth face cover. Current as of: December 18, 2020               Content Version: 12.7  © 2006-2021 [employer]. Care instructions adapted under license by [employer]. If you have questions about a medical condition or this instruction, always ask your healthcare professional. [place] [address] any warranty or liability for your use of this information. Patient Education        Coronavirus ([id number]): Care Instructions  Overview  The coronavirus disease (COVID-19) is caused by a virus. Symptoms may include a fever, a cough, and shortness of breath. It mainly spreads person-to-person through droplets from coughing and sneezing. The virus also can spread when people are in close contact with someone who is infected. Most people have mild symptoms and can take care of themselves at home. If their symptoms get worse, they may need care in a hospital. Treatment may include medicines to reduce symptoms, plus breathing support such as oxygen therapy or a ventilator.   It's important to not spread the virus to others. If you have COVID-19, wear a face cover anytime you are around other people. You need to isolate yourself while you are sick. Leave your home only if you need to get medical care or testing. Follow-up care is a key part of your treatment and safety. Be sure to make and go to all appointments, and call your doctor if you are having problems. It's also a good idea to know your test results and keep a list of the medicines you take. How can you care for yourself at home? · Get extra rest. It can help you feel better. · Drink plenty of fluids. This helps replace fluids lost from fever. Fluids also help ease a scratchy throat. Water, soup, fruit juice, and hot tea with lemon are good choices. · Take acetaminophen (such as Tylenol) to reduce a fever. It may also help with muscle aches. Read and follow all instructions on the label. · Use petroleum jelly on sore skin. This can help if the skin around your nose and lips becomes sore from rubbing a lot with tissues. Tips for self-isolation  · Limit contact with people in your home. If possible, stay in a separate bedroom and use a separate bathroom. · Wear a cloth face cover when you are around other people. It can help stop the spread of the virus when you cough or sneeze. · If you have to leave home, avoid crowds and try to stay at least 6 feet away from other people. · Avoid contact with pets and other animals. · Cover your mouth and nose with a tissue when you cough or sneeze. Then throw it in the trash right away. · Wash your hands often, especially after you cough or sneeze. Use soap and water, and scrub for at least 20 seconds. If soap and water aren't available, use an alcohol-based hand . · Don't share personal household items. These include bedding, towels, cups and glasses, and eating utensils. · [address] in the warmest water allowed for the fabric type, and dry it completely. It's okay to wash other people's laundry with yours. · Clean and disinfect your home every day. Use household  and disinfectant wipes or sprays. Take special care to clean things that you grab with your hands. These include doorknobs, remote controls, phones, and handles on your refrigerator and microwave. And don't forget countertops, tabletops, bathrooms, and computer keyboards. When you can end self-isolation  · If you know or suspect that you have COVID-19, stay in self-isolation until:  ? You haven't had a fever for 24 hours while not taking medicines to lower the fever, and  ? Your symptoms have improved, and  ? It's been at least 10 days since your symptoms started. · Talk to your doctor about whether you also need testing, especially if you have a weakened immune system. When should you call for help? Call 911 anytime you think you may need emergency care. For example, call if you have life-threatening symptoms, such as:    · You have severe trouble breathing. (You can't talk at all.)     · You have constant chest pain or pressure.     · You are severely dizzy or lightheaded.     · You are confused or can't think clearly.     · Your face and lips have a blue color.     · You pass out (lose consciousness) or are very hard to wake up. Call your doctor now or seek immediate medical care if:    · You have moderate trouble breathing. (You can't speak a full sentence.)     · You are coughing up blood (more than about 1 teaspoon).     · You have signs of low blood pressure. These include feeling lightheaded; being too weak to stand; and having cold, pale, clammy skin. Watch closely for changes in your health, and be sure to contact your doctor if:    · Your symptoms get worse.     · You are not getting better as expected. Call before you go to the doctor's office. Follow their instructions. And wear a cloth face cover. Current as of: December 18, 2020               Content Version: 12.7  © 7385-4365 [employer].    Care instructions adapted under license by [facility] ([facility]). If you have questions about a medical condition or this instruction, always ask your healthcare professional. [address] any warranty or liability for your use of this information.

## 2021-01-20 NOTE — PROGRESS NOTES
Chief Complaint   Fatigue (Started a week ago ), Nausea, Dizziness, Other (loss of taste and smell. Started 3 days ago ), Nasal Congestion, and Other (Dry mout)      History of Present Illness   Source of history provided by:  patient. Jose Angel Cee is a 77 y.o. old female who presents to the flu clinic with complaints of Nasal Congestion, Anosmia/Ageusia, Nausea, Fatigue and Dry mouth x 3 days. States symptoms have stayed the same since onset. Has been taking none without symptomatic relief. Denies any Shortness of breath. Denies any hx of no history of pneumonia or bronchitis. ROS   Pertinent positives and negatives are stated within HPI, all other systems reviewed and are negative. Past Medical History:  has a past medical history of Atrial fibrillation ([address] Gastritis with hemorrhage, Hyperlipidemia, Hypertension, Hypothyroidism, Obesity (BMI 30.0-34.9), and Paroxysmal atrial fibrillation ([facility] [address] Past Surgical History:  has a past surgical history that includes Carpal tunnel release (1990's); Ankle surgery (11/2001); Hemorrhoid surgery (2004); Upper gastrointestinal endoscopy (2/28/2008); Colonoscopy (2/28/2008); Colonoscopy (6/17/13); Septoplasty (11/13/2013); eye surgery; Myringotomy Tympanostomy Tube Placement (Right, 4/22/2015); Hemorrhoid surgery (6/1/15); fracture surgery (Right, 11/2001); Dental surgery; skin biopsy (07/07/2016); and Myringotomy Tympanostomy Tube Placement (Right, 5/8/2019). Social History:  reports that she has never smoked. She has never used smokeless tobacco. She reports previous alcohol use. She reports that she does not use drugs. Family History: family history includes Cancer in her mother; Diabetes (age of onset: 77) in her sister; Heart Disease in her father.   Allergies: Albuterol, Atrovent inhaler, Niacin and related, and Codeine    Physical Exam   Vital Signs:  BP (!) 148/86   Pulse 72   Temp 99.1 °F (37.3 °C)   Ht 5' 2\" (1.575 m)   Wt 160 lb (72.6 kg) SpO2 98%   BMI 29.26 kg/m²    Oxygen Saturation Interpretation: Normal.    Constitutional:  Alert, development consistent with age. NAD. Head:  NC/NT. Airway patent. Ears: TMs Clear bilaterally. Canals without exudate or swelling bilaterally. Mouth: Posterior pharynx with mild erythema and clear postnasal drip. no tonsillar hypertrophy or exudate. Neck:  Normal ROM. Supple. no anterior cervical adenopathy noted. Lungs: CTAB without wheezes, rales, or rhonchi. CV:  Regular rate and rhythm, normal heart sounds, without pathological murmurs, ectopy, gallops, or rubs. Skin:  Normal turgor. Warm, dry, without visible rash. Lymphatic: No lymphangitis or adenopathy noted. Neurological:  Oriented. Motor functions intact. Lab / Imaging Results   (All laboratory and radiology results have been personally reviewed by myself)  Labs:  Results for orders placed or performed in visit on 01/20/21   POCT COVID-19, Antigen   Result Value Ref Range    SARS-COV-2, POC Detected Not Detected    Lot Number [id number]     QC Pass/Fail pass        Imaging: All Radiology results interpreted by Radiologist unless otherwise noted. No results found. Medical Decision Making   Pt non-toxic, in no apparent distress and stable at time of discharge. Assessment/Plan   Evelia Daigle was seen today for fatigue, nausea, dizziness, other, nasal congestion and other. Diagnoses and all orders for this visit:    Suspected COVID-19 virus infection  -     POCT COVID-19, Antigen    Dizziness    Dry mouth    Anosmia    Ageusia    COVID-19 virus infection            COVID-19 swab obtained and pending, will call with results once available. Advised self-quarantine at home in the interim. Work excuse provided to patient today. Increase fluids and rest. Symptomatic relief discussed including Tylenol prn pain/fever. Schedule f/u with PCP in 7-10 days if symptoms persist. ED sooner if symptoms worsen or change.  ED immediately with high or refractory fever, progressive SOB, dyspnea, CP, calf pain/swelling, shaking chills, vomiting, abdominal pain, lethargy, flank pain, or decreased urinary output. Pt verbalizes understanding and is in agreement with plan of care. All questions answered. ALLISON Joel - CNP    This visit was provided as a focused evaluation during the COVID -19 pandemic/national emergency. A comprehensive review of all previous patient history and testing was not conducted. Pertinent findings were elicited during the visit. *NOTE: This report was transcribed using voice recognition software. Every effort was made to ensure accuracy; however, inadvertent computerized transcription errors may be present.

## 2021-03-23 NOTE — PROGRESS NOTES
Good cholesterol low. ldl cholesterol, a bad form of cholesterol, slightly elevated.  Liver enzymes slightly elevated. Place order for a RUQ US for further evaluation dx: elevated liver enzymes  Diabetes at goal. No protein in the urine. Noé Younger is a 69 y.o. female who presents today for     Chief Complaint   Patient presents with    Medicare AWV    Check-Up     In addition to completing Medicare AWV, she is due for routine check up and medication refills. However, with the exception of a few medications, she vehemently refused to allow PCP to process new prescriptions/refills for the remaining medications at this time    Hypertension:   Noé Younger is here for follow-up of elevated blood pressure. She is not exercising and is adherent to a low-salt diet. Blood pressure is well controlled at home. Cardiac symptoms: none. Patient denies: chest pain, chest pressure/discomfort, claudication, dyspnea, exertional chest pressure/discomfort, fatigue, irregular heart beat, lower extremity edema, near-syncope, orthopnea, palpitations, paroxysmal nocturnal dyspnea and syncope. Cardiovascular risk factors: advanced age (older than 54 for men, 72 for women), dyslipidemia, hypertension, obesity (BMI >= 30 kg/m2) and sedentary lifestyle. Use of agents associated with hypertension: thyroid hormones. History of target organ damage: atrial fibrillation. Labs reviewed; all within desired limits      Hyperlipidemia:  Patient is here to follow up regarding  hyperlipidemia. This is  generally controlled. Treatment includes Simvastatin 20 mg/day. Patient is not compliant with lifestyle modifications. Patient is not a smoker. Most recent labs reviewed with patient today and are not remarkable. Comorbid conditions include HTN.       Lab Results   Component Value Date    CHOL 163 05/22/2020    CHOL 173 06/27/2019    CHOL 167 05/24/2018     Lab Results   Component Value Date    TRIG 104 05/22/2020    TRIG 162 06/27/2019    TRIG 137 05/24/2018     Lab Results   Component Value Date    HDL 47 05/22/2020    HDL 43 06/27/2019    HDL 40 05/24/2018     Lab Results   Component Value Date    LDLCALC 95 05/22/2020    LDLCALC 98 06/27/2019    LDLCALC 100 (H) mouth.  -     Written inforamation about pityriasis rosea provided to patient      Call or go to ED immediately if symptoms worsen or persist.    Follow Up:  Return in 1 year (on 6/10/2021) for Medicare Annual Wellness Visit in 1 year (6/10/2020). , or sooner if necessary. Educational materials and/or home exercises printed for patient's review and were included in patient instructions on his/her After Visit Summary and given to patient at the end of visit. Counseled regarding above diagnosis, including possible risks and complications,  especially if left uncontrolled. Counseled regarding the possible side effects, risks, benefits and alternatives to treatment; patient and/or guardian verbalizes understanding, agrees, feels comfortable with and wishes to proceed with above treatment plan. Advised patient to call with any new medication issues, and read all Rx info from pharmacy to assure aware of all possible risks and side effects of medication before taking. Reviewed age and gender appropriate health screening exams and vaccinations. Advised patient regarding importance of keeping up with recommended health maintenance and to schedule as soon as possible if overdue, as this is important in assessing for undiagnosed pathology, especially cancer, as well as protecting against potentially harmful/life threatening disease. Patient and/or guardian verbalizes understanding and agrees with above counseling, assessment and plan. All questions answered. Carmine Tim MD            Medicare Annual Wellness Visit  Name: Misha Nguyen Date: 2020   MRN: <[id number]> Sex: Female   Age: 69 y.o. Ethnicity: Non-/Non    : 1945 Race: Matiasjonellegina Zane is here for Medicare AWV    Screenings for behavioral, psychosocial and functional/safety risks, and cognitive dysfunction are all negative except as indicated below.  These results, as well as other patient 1237 06/09/20 1242   BP: (!) 182/98 (!) 182/92   Site: Right Upper Arm Right Upper Arm   Position: Sitting Sitting   Cuff Size: Medium Adult Medium Adult   Pulse: 75    Resp: 22    Temp: 98.5 °F (36.9 °C)    TempSrc: Oral    SpO2: 98%    Weight: 168 lb (76.2 kg)    Height: 5' 2\" (1.575 m)      Body mass index is 30.73 kg/m². Cognitive Function:  Based upon direct observation of the patient, evaluation of cognition   reveals recent and remote memory intact. Physical Exam:    General Appearance: alert and oriented to person, place and time, well developed and well- nourished, in no acute distress  Skin: warm and dry, no rash or erythema  Head: normocephalic and atraumatic  Eyes: pupils equal, round, and reactive to light, extraocular eye movements intact, conjunctivae normal  ENT: tympanic membrane, external ear and ear canal normal bilaterally, nose without deformity, nasal mucosa and turbinates normal without polyps  Neck: supple and non-tender without mass, no thyromegaly or thyroid nodules, no cervical lymphadenopathy  Pulmonary/Chest: clear to auscultation bilaterally- no wheezes, rales or rhonchi, normal air movement, no respiratory distress  Cardiovascular: normal rate, regular rhythm, normal S1 and S2, no murmurs, rubs, clicks, or gallops, distal pulses intact, no carotid bruits  Abdomen: soft, non-tender, non-distended, normal bowel sounds, no masses or organomegaly  Extremities: no cyanosis, clubbing or edema  Musculoskeletal: normal range of motion, no joint swelling, deformity or tenderness  Neurologic: reflexes normal and symmetric, no cranial nerve deficit, gait, coordination and speech normal    Patient's complete Health Risk Assessment and screening values have been reviewed and are found in Flowsheets. The following problems were reviewed today and where indicated follow up appointments were made and/or referrals ordered.     Positive Risk Factor Screenings with Interventions:     General 7-valent (Prevnar7) 05/11/2009    Pneumococcal Polysaccharide ([id number]) 06/13/2014    Zoster Live (Zostavax) 07/17/2013    Zoster Recombinant (Shingrix) 05/09/2018, 05/09/2018, 10/30/2018        Health Maintenance   Topic Date Due    Annual Wellness Visit (AWV)  05/29/2019    DTaP/Tdap/Td vaccine (1 - Tdap) 01/01/2050 (Originally 9/13/1964)    Lipid screen  05/22/2021    TSH testing  05/22/2021    Potassium monitoring  05/22/2021    Creatinine monitoring  05/22/2021    Breast cancer screen  11/18/2021    Colon cancer screen colonoscopy  06/17/2023    Flu vaccine  Completed    Shingles Vaccine  Completed    Pneumococcal 65+ years Vaccine  Completed    DEXA (modify frequency per FRAX score)  Addressed    Hepatitis A vaccine  Aged Out    Hepatitis B vaccine  Aged Out    Hib vaccine  Aged Out    Meningococcal (ACWY) vaccine  Aged Out    Hepatitis C screen  Discontinued     Recommendations for [employer] Due: see orders and patient instructions/AVS.  . Recommended screening schedule for the next 5-10 years is provided to the patient in written form: see Patient Instructions/AVS.      Assessment / Plan:      Shanda Horn was seen today for medicare awv and check-up. Diagnoses and all orders for this visit:    Medicare Annual Wellness exam, subsequent    Follow Up:  Return in 1 year (on 6/10/2021) for Medicare Annual Wellness Visit in 1 year (6/10/2020).         Tiff Montoya MD

## 2021-06-01 LAB
ALBUMIN SERPL-MCNC: NORMAL G/DL
ALP BLD-CCNC: NORMAL U/L
ALT SERPL-CCNC: NORMAL U/L
ANION GAP SERPL CALCULATED.3IONS-SCNC: NORMAL MMOL/L
AST SERPL-CCNC: NORMAL U/L
BASOPHILS ABSOLUTE: NORMAL
BASOPHILS RELATIVE PERCENT: NORMAL
BILIRUB SERPL-MCNC: NORMAL MG/DL
BUN BLDV-MCNC: NORMAL MG/DL
CALCIUM SERPL-MCNC: NORMAL MG/DL
CHLORIDE BLD-SCNC: NORMAL MMOL/L
CHOLESTEROL, TOTAL: NORMAL
CHOLESTEROL/HDL RATIO: NORMAL
CO2: NORMAL
CREAT SERPL-MCNC: NORMAL MG/DL
EOSINOPHILS ABSOLUTE: NORMAL
EOSINOPHILS RELATIVE PERCENT: NORMAL
GFR CALCULATED: NORMAL
GLUCOSE BLD-MCNC: NORMAL MG/DL
HCT VFR BLD CALC: NORMAL %
HDLC SERPL-MCNC: NORMAL MG/DL
HEMOGLOBIN: NORMAL
LDL CHOLESTEROL CALCULATED: NORMAL
LYMPHOCYTES ABSOLUTE: NORMAL
LYMPHOCYTES RELATIVE PERCENT: NORMAL
MCH RBC QN AUTO: NORMAL PG
MCHC RBC AUTO-ENTMCNC: NORMAL G/DL
MCV RBC AUTO: NORMAL FL
MONOCYTES ABSOLUTE: NORMAL
MONOCYTES RELATIVE PERCENT: NORMAL
NEUTROPHILS ABSOLUTE: NORMAL
NEUTROPHILS RELATIVE PERCENT: NORMAL
NONHDLC SERPL-MCNC: NORMAL MG/DL
PDW BLD-RTO: NORMAL %
PLATELET # BLD: NORMAL 10*3/UL
PMV BLD AUTO: NORMAL FL
POTASSIUM SERPL-SCNC: NORMAL MMOL/L
RBC # BLD: NORMAL 10*6/UL
SODIUM BLD-SCNC: NORMAL MMOL/L
T4 TOTAL: NORMAL
TOTAL PROTEIN: NORMAL
TRIGL SERPL-MCNC: NORMAL MG/DL
TSH SERPL DL<=0.05 MIU/L-ACNC: NORMAL M[IU]/L
VITAMIN D 25-HYDROXY: NORMAL
VITAMIN D2, 25 HYDROXY: NORMAL
VITAMIN D3,25 HYDROXY: NORMAL
VLDLC SERPL CALC-MCNC: NORMAL MG/DL
WBC # BLD: NORMAL 10*3/UL

## 2021-06-14 ENCOUNTER — OFFICE VISIT (OUTPATIENT)
Dept: FAMILY MEDICINE CLINIC | Age: 76
End: 2021-06-14
Payer: MEDICARE

## 2021-06-14 VITALS
OXYGEN SATURATION: 95 % | SYSTOLIC BLOOD PRESSURE: 136 MMHG | RESPIRATION RATE: 16 BRPM | TEMPERATURE: 97.8 F | HEART RATE: 78 BPM | DIASTOLIC BLOOD PRESSURE: 86 MMHG | HEIGHT: 62 IN | BODY MASS INDEX: 30.11 KG/M2 | WEIGHT: 163.6 LBS

## 2021-06-14 DIAGNOSIS — E78.2 MIXED HYPERLIPIDEMIA: ICD-10-CM

## 2021-06-14 DIAGNOSIS — I10 ESSENTIAL HYPERTENSION: ICD-10-CM

## 2021-06-14 DIAGNOSIS — E55.9 VITAMIN D INSUFFICIENCY: ICD-10-CM

## 2021-06-14 DIAGNOSIS — Z76.0 ENCOUNTER FOR MEDICATION REFILL: ICD-10-CM

## 2021-06-14 DIAGNOSIS — J32.9 RHINOSINUSITIS: ICD-10-CM

## 2021-06-14 DIAGNOSIS — K59.04 CHRONIC IDIOPATHIC CONSTIPATION: ICD-10-CM

## 2021-06-14 DIAGNOSIS — J31.0 RHINOSINUSITIS: ICD-10-CM

## 2021-06-14 DIAGNOSIS — Z00.00 ROUTINE GENERAL MEDICAL EXAMINATION AT A HEALTH CARE FACILITY: Primary | ICD-10-CM

## 2021-06-14 DIAGNOSIS — E03.9 ACQUIRED HYPOTHYROIDISM: ICD-10-CM

## 2021-06-14 PROCEDURE — G0439 PPPS, SUBSEQ VISIT: HCPCS | Performed by: FAMILY MEDICINE

## 2021-06-14 RX ORDER — CHOLECALCIFEROL (VITAMIN D3) 25 MCG
1000 CAPSULE ORAL 2 TIMES DAILY
Qty: 180 CAPSULE | Refills: 1 | COMMUNITY
Start: 2021-06-14 | End: 2021-07-12

## 2021-06-14 RX ORDER — DOCUSATE SODIUM 100 MG/1
100 CAPSULE, LIQUID FILLED ORAL 2 TIMES DAILY
Qty: 180 CAPSULE | Refills: 1 | COMMUNITY
Start: 2021-06-14 | End: 2021-12-14

## 2021-06-14 RX ORDER — FLUTICASONE PROPIONATE 50 MCG
2 SPRAY, SUSPENSION (ML) NASAL DAILY
Qty: 3 BOTTLE | Refills: 1 | Status: SHIPPED
Start: 2021-06-14 | End: 2021-12-15 | Stop reason: SDUPTHER

## 2021-06-14 RX ORDER — POTASSIUM CHLORIDE 750 MG/1
TABLET, EXTENDED RELEASE ORAL
Qty: 90 TABLET | Refills: 1 | Status: SHIPPED
Start: 2021-06-14 | End: 2021-12-15 | Stop reason: SDUPTHER

## 2021-06-14 RX ORDER — BENAZEPRIL HYDROCHLORIDE 40 MG/1
TABLET, FILM COATED ORAL
Qty: 90 TABLET | Refills: 1 | Status: SHIPPED
Start: 2021-06-14 | End: 2021-12-15 | Stop reason: SDUPTHER

## 2021-06-14 RX ORDER — ASPIRIN 81 MG/1
81 TABLET, CHEWABLE ORAL DAILY
Qty: 90 TABLET | Refills: 1 | COMMUNITY
Start: 2021-06-14 | End: 2021-12-15 | Stop reason: SDUPTHER

## 2021-06-14 RX ORDER — SIMVASTATIN 20 MG
20 TABLET ORAL NIGHTLY
Qty: 90 TABLET | Refills: 1 | Status: SHIPPED
Start: 2021-06-14 | End: 2021-12-15 | Stop reason: SDUPTHER

## 2021-06-14 RX ORDER — LEVOTHYROXINE SODIUM 112 MCG
TABLET ORAL
Qty: 90 TABLET | Refills: 1 | Status: SHIPPED
Start: 2021-06-14 | End: 2021-12-15 | Stop reason: SDUPTHER

## 2021-06-14 RX ORDER — POLYETHYLENE GLYCOL 3350 17 G/17G
POWDER, FOR SOLUTION ORAL
Qty: 527 G | Refills: 1 | COMMUNITY
Start: 2021-06-14 | End: 2021-12-14

## 2021-06-14 RX ORDER — OMEGA-3/DHA/EPA/FISH OIL 300-1000MG
2 CAPSULE ORAL 2 TIMES DAILY
Qty: 360 CAPSULE | Refills: 1 | COMMUNITY
Start: 2021-06-14 | End: 2021-12-15 | Stop reason: SDUPTHER

## 2021-06-14 RX ORDER — DILTIAZEM HYDROCHLORIDE 360 MG/1
360 CAPSULE, EXTENDED RELEASE ORAL DAILY
Qty: 90 CAPSULE | Refills: 1 | Status: SHIPPED
Start: 2021-06-14 | End: 2021-12-15 | Stop reason: SDUPTHER

## 2021-06-14 RX ORDER — HYDROCHLOROTHIAZIDE 25 MG/1
TABLET ORAL
Qty: 90 TABLET | Refills: 1 | Status: SHIPPED
Start: 2021-06-14 | End: 2021-12-15 | Stop reason: SDUPTHER

## 2021-06-14 SDOH — ECONOMIC STABILITY: FOOD INSECURITY: WITHIN THE PAST 12 MONTHS, THE FOOD YOU BOUGHT JUST DIDN'T LAST AND YOU DIDN'T HAVE MONEY TO GET MORE.: NEVER TRUE

## 2021-06-14 SDOH — ECONOMIC STABILITY: FOOD INSECURITY: WITHIN THE PAST 12 MONTHS, YOU WORRIED THAT YOUR FOOD WOULD RUN OUT BEFORE YOU GOT MONEY TO BUY MORE.: NEVER TRUE

## 2021-06-14 ASSESSMENT — SOCIAL DETERMINANTS OF HEALTH (SDOH): HOW HARD IS IT FOR YOU TO PAY FOR THE VERY BASICS LIKE FOOD, HOUSING, MEDICAL CARE, AND HEATING?: NOT HARD AT ALL

## 2021-06-14 ASSESSMENT — LIFESTYLE VARIABLES
AUDIT TOTAL SCORE: INCOMPLETE
HOW OFTEN DO YOU HAVE A DRINK CONTAINING ALCOHOL: 0
AUDIT-C TOTAL SCORE: INCOMPLETE
HOW OFTEN DO YOU HAVE A DRINK CONTAINING ALCOHOL: NEVER

## 2021-06-14 ASSESSMENT — PATIENT HEALTH QUESTIONNAIRE - PHQ9
SUM OF ALL RESPONSES TO PHQ9 QUESTIONS 1 & 2: 0
SUM OF ALL RESPONSES TO PHQ QUESTIONS 1-9: 0
2. FEELING DOWN, DEPRESSED OR HOPELESS: 0
1. LITTLE INTEREST OR PLEASURE IN DOING THINGS: 0

## 2021-06-14 NOTE — PATIENT INSTRUCTIONS
Personalized Preventive Plan for Pb Slaughter - 6/14/2021  Medicare offers a range of preventive health benefits. Some of the tests and screenings are paid in full while other may be subject to a deductible, co-insurance, and/or copay. Some of these benefits include a comprehensive review of your medical history including lifestyle, illnesses that may run in your family, and various assessments and screenings as appropriate. After reviewing your medical record and screening and assessments performed today your provider may have ordered immunizations, labs, imaging, and/or referrals for you. A list of these orders (if applicable) as well as your Preventive Care list are included within your After Visit Summary for your review. Other Preventive Recommendations:    · A preventive eye exam performed by an eye specialist is recommended every 1-2 years to screen for glaucoma; cataracts, macular degeneration, and other eye disorders. · A preventive dental visit is recommended every 6 months. · Try to get at least 150 minutes of exercise per week or 10,000 steps per day on a pedometer . · Order or download the FREE \"Exercise & Physical Activity: Your Everyday Guide\" from [employer]. Call 2-814.679.4445 or search [employer] online. · You need 9629-3660 mg of calcium and 2074-5271 IU of vitamin D per day. It is possible to meet your calcium requirement with diet alone, but a vitamin D supplement is usually necessary to meet this goal.  · When exposed to the sun, use a sunscreen that protects against both UVA and UVB radiation with an SPF of 30 or greater. Reapply every 2 to 3 hours or after sweating, drying off with a towel, or swimming. · Always wear a seat belt when traveling in a car. Always wear a helmet when riding a bicycle or motorcycle.   · Please provide a copy of your Living Will and [address] [place] [address] to be scanned into your medical records

## 2021-06-14 NOTE — PROGRESS NOTES
Medicare Annual Wellness Visit  Name: Danny Mendez Date: 2021   MRN: <[id number]> Sex: Female   Age: 77 y.o. Ethnicity: Non-/Non    : 1945 Race: Sarthak Cee is here for Medicare AWV    Screenings for behavioral, psychosocial and functional/safety risks, and cognitive dysfunction are all negative except as indicated below. These results, as well as other patient data from the [address] form, are documented in Flowsheets linked to this Encounter. Allergies   Allergen Reactions    Albuterol Swelling    Atrovent Inhaler Swelling    Niacin And Related Swelling    Codeine Rash     Codeine And Related       Prior to Visit Medications    Medication Sig Taking?  Authorizing Provider   aspirin 81 MG chewable tablet Take 1 tablet by mouth daily Indications: Paroxysmal Atrial Fibrillation Yes Shanta Hogan MD   benazepril (LOTENSIN) 40 MG tablet TAKE 1 TABLET BY MOUTH EVERY DAY FOR HIGH BLOOD PRESSURE Yes Wandy Andrade MD   Cholecalciferol (VITAMIN D-3) 25 MCG (1000 UT) CAPS Take 1,000 Units by mouth 2 times daily Yes Wandy Andrade MD   dilTIAZem (TIAZAC) 360 MG extended release capsule Take 1 capsule by mouth daily Yes Wandy Andrade MD   docusate sodium (COLACE) 100 MG capsule Take 1 capsule by mouth 2 times daily Yes Wandy Andrade MD   fish oil-omega-3 fatty acids 1000 MG capsule Take 2 capsules by mouth 2 times daily Yes Shanta Hogan MD   fluticasone (FLONASE) 50 MCG/ACT nasal spray 2 sprays by Nasal route daily 2 sprays in each nostril daily Yes Shanta Hogan MD   hydroCHLOROthiazide (HYDRODIURIL) 25 MG tablet TAKE 1 TABLET BY MOUTH EVERY DAY FOR HIGH BLOOD PRESSURE Yes Shanta Hogan MD   potassium chloride (KLOR-CON M10) 10 MEQ extended release tablet TAKE 1 TABLET BY MOUTH EVERY DAY Yes Shanta Hogan MD   simvastatin (ZOCOR) 20 MG tablet Take 1 tablet by mouth nightly Indications: High Amount of Fats in the Blood Take 1 tablet nightly Yes Shanta Hogan MD   SYNTHROID 112 MCG tablet TAKE 1 TABLET BY MOUTH EVERY DAY ON AN EMPTY STOMACH Yes Shanta Hogan MD   polyethylene glycol (GLYCOLAX) 17 GM/SCOOP powder take 17 grams DISSOLVED IN WATER by mouth once daily Yes Brianne Colin MD   Calcium Carbonate-Vit D-Min (CALTRATE PLUS PO) Take 1,200 mg by mouth 2 times daily Yes Historical Provider, MD   Coenzyme Q10 (CO Q 10) 100 MG CAPS Take 200 mg by mouth daily Yes Historical Provider, MD   senna (SENOKOT) 8.6 MG tablet Take 1 tablet by mouth 2 times daily  Patient not taking: Reported on 2021  Prakash Mckay MD       Past Medical History:   Diagnosis Date    Atrial fibrillation ([address]     Gastritis with hemorrhage     Hyperlipidemia     Hypertension     Hypothyroidism     Obesity (BMI 30.0-34. 9) 2017    Paroxysmal atrial fibrillation [facility])        Past Surgical History:   Procedure Laterality Date    ANKLE SURGERY  2001    right    CARPAL TUNNEL RELEASE      bilateral    COLONOSCOPY  2008    diverticulosis internal and external hemorrhoids.  COLONOSCOPY  13    DENTAL SURGERY      EYE SURGERY      FRACTURE SURGERY Right 2001    ORIF right ankle    HEMORRHOID SURGERY      HEMORRHOID SURGERY  6/1/15    MYRINGOTOMY AND TYMPANOSTOMY TUBE PLACEMENT Right 2015    MYRINGOTOMY AND TYMPANOSTOMY TUBE PLACEMENT Right 2019    EVALUATION UNDER ANESTHESIA CLEAN RIGHT EAR performed by Vicente Zendejas DO at [address] SEPTOPLASTY  2013    and tonia ear exam    SKIN BIOPSY  2016    ear    UPPER GASTROINTESTINAL ENDOSCOPY  2008    gastric ulcer.  Dr. Paola Barbour       Family History   Problem Relation Age of Onset   Buelah Hayti Cancer Mother          at 61 because of lung cancer    Heart Disease Father          at 79 secondary to MI    Diabetes Sister 77 CareTeam (Including outside providers/suppliers regularly involved in providing care):   Patient Care Team:  Kenneth Valente MD as PCP - General (Family Medicine)  [facility]  Lucia Licona [facility],  as Surgeon (Otolaryngology)  Taylor Keene MD as Consulting Physician (Dermatology)  Fidencio Lawton MD as Surgeon (Trauma Surgery)    Wt Readings from Last 3 Encounters:   06/14/21 163 lb 9.6 oz (74.2 kg)   01/20/21 160 lb (72.6 kg)   10/26/20 168 lb 6.4 oz (76.4 kg)     Vitals:    06/14/21 1326   BP: 136/86   Pulse: 78   Resp: 16   Temp: 97.8 °F (36.6 °C)   TempSrc: Infrared   SpO2: 95%   Weight: 163 lb 9.6 oz (74.2 kg)   Height: 5' 2\" (1.575 m)     Body mass index is 29.92 kg/m². Cognitive function:  Based upon direct observation of the patient, evaluation of cognition reveals recent and remote memory intact0. Luis Miguel Champagne Patient's complete Health Risk Assessment and screening values have been reviewed and are found in Flowsheets. The following problems were reviewed today and where indicated follow up appointments were made and/or referrals ordered.     Positive Risk Factor Screenings with Interventions:           Health Habits/Nutrition:  Health Habits/Nutrition  Do you exercise for at least 20 minutes 2-3 times per week?: (!) No  Have you lost any weight without trying in the past 3 months?: No  Do you eat only one meal per day?: No  Have you seen the dentist within the past year?: Yes  Body mass index: (!) 29.92  Health Habits/Nutrition Interventions:  · Inadequate physical activity:  patient is not ready to increase his/her physical activity level at this time       Personalized Preventive Plan   Current Health Maintenance Status  Immunization History   Administered Date(s) Administered    Influenza 11/02/2010, 10/07/2011    Influenza Virus Vaccine 10/07/2012, 09/22/2014    Influenza, High Dose (Fluzone 65 yrs and older) 09/22/2015, 10/17/2016, 10/11/2017, 10/30/2018, 09/18/2020    Pneumococcal Conjugate 13-valent ([id number]) 09/22/2015    Pneumococcal Conjugate 7-valent (Prevnar7) 05/11/2009    Pneumococcal Polysaccharide ([id number]) 06/13/2014    Zoster Live (Zostavax) 07/17/2013    Zoster Recombinant (Shingrix) 05/09/2018, 05/09/2018, 10/30/2018        Health Maintenance   Topic Date Due    COVID-19 Vaccine (1) Never done   [employer] Visit (AWV)  Never done    TSH testing  05/22/2021    Potassium monitoring  05/22/2021    Creatinine monitoring  05/22/2021    Lipid screen  05/22/2021    DTaP/Tdap/Td vaccine (1 - Tdap) 01/01/2050 (Originally 9/13/1964)    Breast cancer screen  11/19/2022    Colon cancer screen colonoscopy  06/17/2023    DEXA (modify frequency per FRAX score)  Completed    Flu vaccine  Completed    Shingles Vaccine  Completed    Pneumococcal 65+ years Vaccine  Completed    Hepatitis A vaccine  Aged Out    Hepatitis B vaccine  Aged Out    Hib vaccine  Aged Out    Meningococcal (ACWY) vaccine  Aged Out    Hepatitis C screen  Discontinued     Recommendations for [employer] Due: see orders and patient instructions/AVS.  . Recommended screening schedule for the next 5-10 years is provided to the patient in written form: see Patient Instructions/AVS.      Assessment / Plan:      Roland Sicard was seen today for medicare awv. Diagnoses and all orders for this visit:    Medicare Annual Wellness Exam, subsequent    Encounter for medication refill  -     aspirin 81 MG chewable tablet; Take 1 tablet by mouth daily Indications: Paroxysmal Atrial Fibrillation  -     benazepril (LOTENSIN) 40 MG tablet; TAKE 1 TABLET BY MOUTH EVERY DAY FOR HIGH BLOOD PRESSURE  -     Cholecalciferol (VITAMIN D-3) 25 MCG (1000 UT) CAPS; Take 1,000 Units by mouth 2 times daily  -     dilTIAZem (TIAZAC) 360 MG extended release capsule; Take 1 capsule by mouth daily  -     docusate sodium (COLACE) 100 MG capsule;  Take 1 capsule by mouth 2 times daily  -     fish oil-omega-3 fatty acids 1000 MG capsule; Take 2 capsules by mouth 2 times daily  -     fluticasone (FLONASE) 50 MCG/ACT nasal spray; 2 sprays by Nasal route daily 2 sprays in each nostril daily  -     hydroCHLOROthiazide (HYDRODIURIL) 25 MG tablet; TAKE 1 TABLET BY MOUTH EVERY DAY FOR HIGH BLOOD PRESSURE  -     potassium chloride (KLOR-CON M10) 10 MEQ extended release tablet; TAKE 1 TABLET BY MOUTH EVERY DAY  -     simvastatin (ZOCOR) 20 MG tablet; Take 1 tablet by mouth nightly Indications: High Amount of Fats in the Blood Take 1 tablet nightly  -     SYNTHROID 112 MCG tablet; TAKE 1 TABLET BY MOUTH EVERY DAY ON AN EMPTY STOMACH  -     polyethylene glycol (GLYCOLAX) 17 GM/SCOOP powder; take 17 grams DISSOLVED IN WATER by mouth once daily    Essential hypertension  -     aspirin 81 MG chewable tablet; Take 1 tablet by mouth daily Indications: Paroxysmal Atrial Fibrillation  -     benazepril (LOTENSIN) 40 MG tablet; TAKE 1 TABLET BY MOUTH EVERY DAY FOR HIGH BLOOD PRESSURE  -     dilTIAZem (TIAZAC) 360 MG extended release capsule; Take 1 capsule by mouth daily  -     hydroCHLOROthiazide (HYDRODIURIL) 25 MG tablet; TAKE 1 TABLET BY MOUTH EVERY DAY FOR HIGH BLOOD PRESSURE  -     potassium chloride (KLOR-CON M10) 10 MEQ extended release tablet; TAKE 1 TABLET BY MOUTH EVERY DAY    Vitamin D insufficiency  -     Cholecalciferol (VITAMIN D-3) 25 MCG (1000 UT) CAPS; Take 1,000 Units by mouth 2 times daily    Hyperlipemia  -     fish oil-omega-3 fatty acids 1000 MG capsule; Take 2 capsules by mouth 2 times daily    Rhinosinusitis  -     fluticasone (FLONASE) 50 MCG/ACT nasal spray; 2 sprays by Nasal route daily 2 sprays in each nostril daily    Mixed hyperlipidemia  -     simvastatin (ZOCOR) 20 MG tablet;  Take 1 tablet by mouth nightly Indications: High Amount of Fats in the Blood Take 1 tablet nightly    Acquired hypothyroidism  -     SYNTHROID 112 MCG tablet; TAKE 1 TABLET BY MOUTH EVERY DAY ON AN EMPTY STOMACH    Chronic idiopathic constipation  -     polyethylene glycol (GLYCOLAX) 17 GM/SCOOP powder; take 17 grams DISSOLVED IN WATER by mouth once daily           Follow Up:  Return for 1) 6 months for check up and med refills; 2) Medicare Annual Wellness Visit in 1 year.          Mana Mooney MD

## 2021-07-12 DIAGNOSIS — E55.9 VITAMIN D INSUFFICIENCY: ICD-10-CM

## 2021-07-12 DIAGNOSIS — Z01.89 ENCOUNTER FOR LABORATORY EXAMINATION: ICD-10-CM

## 2021-07-12 DIAGNOSIS — Z76.0 ENCOUNTER FOR MEDICATION REFILL: ICD-10-CM

## 2021-07-12 RX ORDER — CHOLECALCIFEROL (VITAMIN D3) 25 MCG
CAPSULE ORAL
Qty: 45 CAPSULE | Refills: 1 | Status: SHIPPED
Start: 2021-07-12 | End: 2021-12-15 | Stop reason: SDUPTHER

## 2021-07-13 DIAGNOSIS — Z12.31 ENCOUNTER FOR SCREENING MAMMOGRAM FOR MALIGNANT NEOPLASM OF BREAST: Primary | ICD-10-CM

## 2021-09-08 DIAGNOSIS — E55.9 VITAMIN D INSUFFICIENCY: ICD-10-CM

## 2021-09-08 DIAGNOSIS — I10 ESSENTIAL HYPERTENSION: Primary | ICD-10-CM

## 2021-09-08 DIAGNOSIS — E03.9 ACQUIRED HYPOTHYROIDISM: ICD-10-CM

## 2021-09-08 DIAGNOSIS — E78.2 MIXED HYPERLIPIDEMIA: ICD-10-CM

## 2021-11-22 ENCOUNTER — HOSPITAL ENCOUNTER (OUTPATIENT)
Dept: GENERAL RADIOLOGY | Age: 76
Discharge: HOME | End: 2021-11-24
Payer: MEDICARE

## 2021-11-22 DIAGNOSIS — Z12.31 ENCOUNTER FOR SCREENING MAMMOGRAM FOR MALIGNANT NEOPLASM OF BREAST: ICD-10-CM

## 2021-11-22 PROCEDURE — 77063 BREAST TOMOSYNTHESIS BI: CPT

## 2021-12-02 LAB
ALBUMIN SERPL-MCNC: NORMAL G/DL
ALP BLD-CCNC: NORMAL U/L
ALT SERPL-CCNC: NORMAL U/L
ANION GAP SERPL CALCULATED.3IONS-SCNC: NORMAL MMOL/L
AST SERPL-CCNC: NORMAL U/L
BILIRUB SERPL-MCNC: NORMAL MG/DL
BUN BLDV-MCNC: NORMAL MG/DL
CALCIUM SERPL-MCNC: NORMAL MG/DL
CHLORIDE BLD-SCNC: NORMAL MMOL/L
CHOLESTEROL, TOTAL: NORMAL
CHOLESTEROL/HDL RATIO: NORMAL
CO2: NORMAL
CREAT SERPL-MCNC: NORMAL MG/DL
GFR CALCULATED: NORMAL
GLUCOSE BLD-MCNC: NORMAL MG/DL
HDLC SERPL-MCNC: NORMAL MG/DL
LDL CHOLESTEROL CALCULATED: NORMAL
NONHDLC SERPL-MCNC: NORMAL MG/DL
POTASSIUM SERPL-SCNC: NORMAL MMOL/L
SODIUM BLD-SCNC: NORMAL MMOL/L
T4 TOTAL: NORMAL
TOTAL PROTEIN: NORMAL
TRIGL SERPL-MCNC: NORMAL MG/DL
TSH SERPL DL<=0.05 MIU/L-ACNC: NORMAL M[IU]/L
VITAMIN D 25-HYDROXY: NORMAL
VITAMIN D2, 25 HYDROXY: NORMAL
VITAMIN D3,25 HYDROXY: NORMAL
VLDLC SERPL CALC-MCNC: NORMAL MG/DL

## 2021-12-14 DIAGNOSIS — I10 ESSENTIAL HYPERTENSION: ICD-10-CM

## 2021-12-14 DIAGNOSIS — E03.9 ACQUIRED HYPOTHYROIDISM: ICD-10-CM

## 2021-12-14 DIAGNOSIS — E78.2 MIXED HYPERLIPIDEMIA: ICD-10-CM

## 2021-12-15 ENCOUNTER — OFFICE VISIT (OUTPATIENT)
Dept: FAMILY MEDICINE CLINIC | Age: 76
End: 2021-12-15
Payer: MEDICARE

## 2021-12-15 VITALS
TEMPERATURE: 97.4 F | OXYGEN SATURATION: 96 % | HEART RATE: 79 BPM | SYSTOLIC BLOOD PRESSURE: 136 MMHG | WEIGHT: 158 LBS | HEIGHT: 62 IN | DIASTOLIC BLOOD PRESSURE: 84 MMHG | BODY MASS INDEX: 29.08 KG/M2 | RESPIRATION RATE: 16 BRPM

## 2021-12-15 DIAGNOSIS — E78.2 MIXED HYPERLIPIDEMIA: ICD-10-CM

## 2021-12-15 DIAGNOSIS — E55.9 VITAMIN D INSUFFICIENCY: ICD-10-CM

## 2021-12-15 DIAGNOSIS — J31.0 RHINOSINUSITIS: ICD-10-CM

## 2021-12-15 DIAGNOSIS — I10 ESSENTIAL HYPERTENSION: ICD-10-CM

## 2021-12-15 DIAGNOSIS — Z76.0 ENCOUNTER FOR MEDICATION REFILL: Primary | ICD-10-CM

## 2021-12-15 DIAGNOSIS — J32.9 RHINOSINUSITIS: ICD-10-CM

## 2021-12-15 DIAGNOSIS — E03.9 ACQUIRED HYPOTHYROIDISM: ICD-10-CM

## 2021-12-15 PROCEDURE — 99214 OFFICE O/P EST MOD 30 MIN: CPT | Performed by: FAMILY MEDICINE

## 2021-12-15 RX ORDER — BENAZEPRIL HYDROCHLORIDE 40 MG/1
TABLET, FILM COATED ORAL
Qty: 90 TABLET | Refills: 3 | Status: SHIPPED | OUTPATIENT
Start: 2021-12-15 | End: 2022-12-15

## 2021-12-15 RX ORDER — FLUTICASONE PROPIONATE 50 MCG
2 SPRAY, SUSPENSION (ML) NASAL DAILY
Qty: 18.2 EACH | Refills: 3
Start: 2021-12-15 | End: 2022-12-15

## 2021-12-15 RX ORDER — CHOLECALCIFEROL (VITAMIN D3) 25 MCG
CAPSULE ORAL
Qty: 45 CAPSULE | Refills: 3 | COMMUNITY
Start: 2021-12-15 | End: 2022-12-15

## 2021-12-15 RX ORDER — OMEGA-3/DHA/EPA/FISH OIL 300-1000MG
2 CAPSULE ORAL 2 TIMES DAILY
Qty: 360 CAPSULE | Refills: 3 | COMMUNITY
Start: 2021-12-15 | End: 2022-12-15

## 2021-12-15 RX ORDER — SIMVASTATIN 20 MG
20 TABLET ORAL NIGHTLY
Qty: 90 TABLET | Refills: 3 | Status: SHIPPED | OUTPATIENT
Start: 2021-12-15 | End: 2022-12-15

## 2021-12-15 RX ORDER — LEVOTHYROXINE SODIUM 112 MCG
TABLET ORAL
Qty: 90 TABLET | Refills: 3 | Status: SHIPPED | OUTPATIENT
Start: 2021-12-15 | End: 2022-12-15

## 2021-12-15 RX ORDER — DILTIAZEM HYDROCHLORIDE 360 MG/1
360 CAPSULE, EXTENDED RELEASE ORAL DAILY
Qty: 90 CAPSULE | Refills: 3 | Status: SHIPPED | OUTPATIENT
Start: 2021-12-15 | End: 2022-12-15

## 2021-12-15 RX ORDER — ASPIRIN 81 MG/1
81 TABLET, CHEWABLE ORAL DAILY
Qty: 90 TABLET | Refills: 3 | COMMUNITY
Start: 2021-12-15 | End: 2022-12-15

## 2021-12-15 RX ORDER — POTASSIUM CHLORIDE 750 MG/1
TABLET, EXTENDED RELEASE ORAL
Qty: 90 TABLET | Refills: 3 | Status: SHIPPED | OUTPATIENT
Start: 2021-12-15 | End: 2022-12-15

## 2021-12-15 RX ORDER — HYDROCHLOROTHIAZIDE 25 MG/1
TABLET ORAL
Qty: 90 TABLET | Refills: 3 | Status: SHIPPED | OUTPATIENT
Start: 2021-12-15 | End: 2022-12-15

## 2021-12-15 SDOH — ECONOMIC STABILITY: TRANSPORTATION INSECURITY
IN THE PAST 12 MONTHS, HAS LACK OF TRANSPORTATION KEPT YOU FROM MEETINGS, WORK, OR FROM GETTING THINGS NEEDED FOR DAILY LIVING?: NO

## 2021-12-15 SDOH — ECONOMIC STABILITY: HOUSING INSECURITY: IN THE LAST 12 MONTHS, HOW MANY PLACES HAVE YOU LIVED?: 1

## 2021-12-15 SDOH — ECONOMIC STABILITY: INCOME INSECURITY: IN THE LAST 12 MONTHS, WAS THERE A TIME WHEN YOU WERE NOT ABLE TO PAY THE MORTGAGE OR RENT ON TIME?: NO

## 2021-12-15 SDOH — ECONOMIC STABILITY: HOUSING INSECURITY
IN THE LAST 12 MONTHS, WAS THERE A TIME WHEN YOU DID NOT HAVE A STEADY PLACE TO SLEEP OR SLEPT IN A SHELTER (INCLUDING NOW)?: NO

## 2021-12-15 SDOH — ECONOMIC STABILITY: TRANSPORTATION INSECURITY
IN THE PAST 12 MONTHS, HAS THE LACK OF TRANSPORTATION KEPT YOU FROM MEDICAL APPOINTMENTS OR FROM GETTING MEDICATIONS?: NO

## 2021-12-15 ASSESSMENT — ENCOUNTER SYMPTOMS
COUGH: 0
NAUSEA: 0
SHORTNESS OF BREATH: 0
SINUS PAIN: 0
SORE THROAT: 0
WHEEZING: 0
BLOOD IN STOOL: 0
BACK PAIN: 0
HEARTBURN: 0
CONSTIPATION: 0
DOUBLE VISION: 0
DIARRHEA: 0
SPUTUM PRODUCTION: 0
BLURRED VISION: 0
ORTHOPNEA: 0
VOMITING: 0
ABDOMINAL PAIN: 0

## 2021-12-15 ASSESSMENT — LIFESTYLE VARIABLES
HOW OFTEN DO YOU HAVE A DRINK CONTAINING ALCOHOL: NEVER
HOW MANY STANDARD DRINKS CONTAINING ALCOHOL DO YOU HAVE ON A TYPICAL DAY: 1 OR 2

## 2021-12-15 NOTE — PROGRESS NOTES
Laureen Robledo is a 77 y.o. female who presents today for     Chief Complaint   Patient presents with    Hypertension     6 month check up   [facility] Maintenance     pt stated she recieved flu shot      In need of all medication refills. Hypertension:   Laureen Robledo is here for follow-up of elevated blood pressure. BP today: 136/84. She is not exercising and is adherent to a low-salt diet. Blood pressure is well controlled at home. Cardiac symptoms: none. Patient denies: chest pain, chest pressure/discomfort, claudication, dyspnea, exertional chest pressure/discomfort, fatigue, irregular heart beat, lower extremity edema, near-syncope, orthopnea, palpitations, paroxysmal nocturnal dyspnea and syncope. Cardiovascular risk factors: advanced age (older than 54 for men, 72 for women), dyslipidemia, hypertension, obesity (BMI >= 30 kg/m2) and sedentary lifestyle. Use of agents associated with hypertension: thyroid hormones. History of target organ damage: atrial fibrillation. Labs reviewed; all within desired limits      Hyperlipidemia:  Patient is here to follow up regarding  hyperlipidemia. This is  generally controlled. Treatment includes Simvastatin 20 mg/day. Patient is not compliant with lifestyle modifications. Patient is not a smoker. Most recent labs reviewed with patient today and are not remarkable. Comorbid conditions include HTN. Lab Results   Component Value Date    CHOL 163 05/22/2020    CHOL 173 06/27/2019    CHOL 167 05/24/2018     Lab Results   Component Value Date    TRIG 104 05/22/2020    TRIG 162 06/27/2019    TRIG 137 05/24/2018     Lab Results   Component Value Date    HDL 47 05/22/2020    HDL 43 06/27/2019    HDL 40 05/24/2018     Lab Results   Component Value Date    LDLCALC 95 05/22/2020    LDLCALC 98 06/27/2019    LDLCALC 100 (H) 05/24/2018     Labs 12/21 reviewed and noted to be WDL. Constipation:  Long standing.   Managed with stool softener, fiber capsules and fiber gummies, adequate hydration. However, symptoms are worse now that diltiazem dosage was increased. Health Maintenance:  Fauzia Cervantes is UTD    [address]:  Patient's past medical, surgical, social and/or family history reviewed, updated in chart, and are non-contributory (unless otherwise stated). Medications and allergies also reviewed and updated in chart. Review of Systems  Review of Systems   Constitutional: Negative for chills, fever, malaise/fatigue and weight loss. HENT: Negative for congestion, ear pain, sinus pain, sore throat and tinnitus. Persistent congestion of posterior nasal passages and sinuses. Reports control of symptoms with regular use of Flonase NS   Eyes: Negative for blurred vision and double vision. Respiratory: Negative for cough, sputum production, shortness of breath and wheezing. Cardiovascular: Negative for chest pain, palpitations, orthopnea and leg swelling. Gastrointestinal: Negative for abdominal pain, blood in stool, constipation, diarrhea, heartburn, nausea and vomiting. Genitourinary: Negative for dysuria, frequency, hematuria and urgency. Musculoskeletal: Positive for joint pain. Negative for back pain, myalgias and neck pain. Left shoulder pain     Skin: Positive for rash. Negative for itching. Rash to mid-left upper back   Neurological: Negative for dizziness, tingling, tremors, focal weakness, weakness and headaches. Endo/Heme/Allergies: Does not bruise/bleed easily. Psychiatric/Behavioral: Negative for depression, memory loss and substance abuse. The patient is not nervous/anxious and does not have insomnia.         Physical Exam:    VS:    /84   Pulse 79   Temp 97.4 °F (36.3 °C)   Resp 16   Ht 5' 2\" (1.575 m)   Wt 158 lb (71.7 kg)   SpO2 96%   BMI 28.90 kg/m²   LAST WEIGHT:  Wt Readings from Last 3 Encounters:   12/15/21 158 lb (71.7 kg)   06/14/21 163 lb 9.6 oz (74.2 kg)   01/20/21 160 lb (72.6 kg)     Physical Exam  Constitutional:       General: She is not in acute distress. Appearance: She is well-developed. She is not diaphoretic. HENT:      Head: Normocephalic and atraumatic. Right Ear: External ear normal.      Left Ear: External ear normal.      Mouth/Throat:      Pharynx: No oropharyngeal exudate. Eyes:      General: No scleral icterus. Right eye: No discharge. Conjunctiva/sclera: Conjunctivae normal.      Pupils: Pupils are equal, round, and reactive to light. Neck:      Thyroid: No thyromegaly. Cardiovascular:      Rate and Rhythm: Normal rate and regular rhythm. Heart sounds: Normal heart sounds. No murmur heard. Pulmonary:      Effort: Pulmonary effort is normal. No respiratory distress. Breath sounds: No stridor. No wheezing or rales. Chest:      Chest wall: No tenderness. Abdominal:      General: Bowel sounds are normal. There is no distension. Palpations: Abdomen is soft. There is no mass. Tenderness: There is no abdominal tenderness. There is no guarding. Musculoskeletal:         General: No tenderness. Normal range of motion. Cervical back: Normal range of motion and neck supple. Lymphadenopathy:      Cervical: No cervical adenopathy. Skin:     General: Skin is warm and dry. Coloration: Skin is not pale. Findings: Rash present. No erythema. Neurological:      Mental Status: She is alert and oriented to person, place, and time. Psychiatric:         Behavior: Behavior normal.         Thought Content:  Thought content normal.         Labs:  Lab Results   Component Value Date     05/22/2020    K 3.4 05/22/2020     05/22/2020    CO2 29 05/22/2020    BUN 15 05/22/2020    CREATININE 0.91 05/22/2020    PROT 7.3 05/24/2018    LABALBU 4.4 05/22/2020    LABALBU 4.8 03/23/2011    CALCIUM 10.0 05/22/2020    GFRAA >60 05/08/2019    LABGLOM >60 05/22/2020    LABGLOM >60 05/08/2019    GLUCOSE 98 05/22/2020    GLUCOSE 88 03/23/2011    AST 30 05/22/2020    ALT 30 05/22/2020    ALKPHOS 60 05/22/2020    BILITOT 0.9 05/22/2020    TSH 0.950 05/22/2020    CHOL 163 05/22/2020    TRIG 104 05/22/2020    HDL 47 05/22/2020    LDLCALC 95 05/22/2020        Lab Results   Component Value Date    CHOL 163 05/22/2020    CHOL 173 06/27/2019    CHOL 167 05/24/2018     Lab Results   Component Value Date    TRIG 104 05/22/2020    TRIG 162 06/27/2019    TRIG 137 05/24/2018     Lab Results   Component Value Date    HDL 47 05/22/2020    HDL 43 06/27/2019    HDL 40 05/24/2018     Lab Results   Component Value Date    LDLCALC 95 05/22/2020    LDLCALC 98 06/27/2019    LDLCALC 100 (H) 05/24/2018       No results found for: LABA1C  Lab Results   Component Value Date    [address] 05/22/2020    CREATININE 0.91 05/22/2020         Assessment / Plan:      Kade Mcdaniel was seen today for hypertension and health maintenance. Diagnoses and all orders for this visit:    Encounter for medication refill  -     Cholecalciferol (VITAMIN D-3) 25 MCG (1000 UT) CAPS; Take 1 capsule Monday Wednesday Friday, off all other days  -     aspirin 81 MG chewable tablet; Take 1 tablet by mouth daily Indications: Paroxysmal Atrial Fibrillation  -     benazepril (LOTENSIN) 40 MG tablet; TAKE 1 TABLET BY MOUTH EVERY DAY FOR HIGH BLOOD PRESSURE  -     dilTIAZem (TIAZAC) 360 MG extended release capsule; Take 1 capsule by mouth daily  -     fish oil-omega-3 fatty acids 1000 MG capsule; Take 2 capsules by mouth 2 times daily  -     fluticasone (FLONASE) 50 MCG/ACT nasal spray; 2 sprays by Nasal route daily 2 sprays in each nostril daily  -     hydroCHLOROthiazide (HYDRODIURIL) 25 MG tablet; TAKE 1 TABLET BY MOUTH EVERY DAY FOR HIGH BLOOD PRESSURE  -     potassium chloride (KLOR-CON M10) 10 MEQ extended release tablet; TAKE 1 TABLET BY MOUTH EVERY DAY  -     simvastatin (ZOCOR) 20 MG tablet;  Take 1 tablet by mouth nightly Indications: High Amount of Fats in the Blood Take 1 tablet nightly  - patient's review and were included in patient instructions on his/her After Visit Summary and given to patient at the end of visit. Counseled regarding above diagnosis, including possible risks and complications,  especially if left uncontrolled. Counseled regarding the possible side effects, risks, benefits and alternatives to treatment; patient and/or guardian verbalizes understanding, agrees, feels comfortable with and wishes to proceed with above treatment plan. Advised patient to call with any new medication issues, and read all Rx info from pharmacy to assure aware of all possible risks and side effects of medication before taking. Reviewed age and gender appropriate health screening exams and vaccinations. Advised patient regarding importance of keeping up with recommended health maintenance and to schedule as soon as possible if overdue, as this is important in assessing for undiagnosed pathology, especially cancer, as well as protecting against potentially harmful/life threatening disease. Patient and/or guardian verbalizes understanding and agrees with above counseling, assessment and plan. All questions answered.     Elvia Chin MD

## 2021-12-23 DIAGNOSIS — E55.9 VITAMIN D INSUFFICIENCY: ICD-10-CM

## 2022-06-15 ENCOUNTER — OFFICE VISIT (OUTPATIENT)
Dept: FAMILY MEDICINE CLINIC | Age: 77
End: 2022-06-15
Payer: MEDICARE

## 2022-06-15 VITALS
TEMPERATURE: 97.4 F | WEIGHT: 163 LBS | OXYGEN SATURATION: 96 % | SYSTOLIC BLOOD PRESSURE: 136 MMHG | HEART RATE: 85 BPM | HEIGHT: 62 IN | DIASTOLIC BLOOD PRESSURE: 86 MMHG | BODY MASS INDEX: 30 KG/M2 | RESPIRATION RATE: 16 BRPM

## 2022-06-15 DIAGNOSIS — M79.671 FOOT PAIN, RIGHT: ICD-10-CM

## 2022-06-15 DIAGNOSIS — Z87.81 HISTORY OF FRACTURE OF RIGHT ANKLE: ICD-10-CM

## 2022-06-15 DIAGNOSIS — Z00.00 MEDICARE ANNUAL WELLNESS VISIT, SUBSEQUENT: Primary | ICD-10-CM

## 2022-06-15 PROCEDURE — 1123F ACP DISCUSS/DSCN MKR DOCD: CPT | Performed by: FAMILY MEDICINE

## 2022-06-15 PROCEDURE — G0439 PPPS, SUBSEQ VISIT: HCPCS | Performed by: FAMILY MEDICINE

## 2022-06-15 SDOH — ECONOMIC STABILITY: FOOD INSECURITY: WITHIN THE PAST 12 MONTHS, YOU WORRIED THAT YOUR FOOD WOULD RUN OUT BEFORE YOU GOT MONEY TO BUY MORE.: NEVER TRUE

## 2022-06-15 SDOH — ECONOMIC STABILITY: FOOD INSECURITY: WITHIN THE PAST 12 MONTHS, THE FOOD YOU BOUGHT JUST DIDN'T LAST AND YOU DIDN'T HAVE MONEY TO GET MORE.: NEVER TRUE

## 2022-06-15 ASSESSMENT — PATIENT HEALTH QUESTIONNAIRE - PHQ9
SUM OF ALL RESPONSES TO PHQ9 QUESTIONS 1 & 2: 0
1. LITTLE INTEREST OR PLEASURE IN DOING THINGS: 0
SUM OF ALL RESPONSES TO PHQ QUESTIONS 1-9: 0
2. FEELING DOWN, DEPRESSED OR HOPELESS: 0
SUM OF ALL RESPONSES TO PHQ QUESTIONS 1-9: 0

## 2022-06-15 ASSESSMENT — SOCIAL DETERMINANTS OF HEALTH (SDOH): HOW HARD IS IT FOR YOU TO PAY FOR THE VERY BASICS LIKE FOOD, HOUSING, MEDICAL CARE, AND HEATING?: NOT VERY HARD

## 2022-06-15 ASSESSMENT — LIFESTYLE VARIABLES: HOW OFTEN DO YOU HAVE A DRINK CONTAINING ALCOHOL: NEVER

## 2022-06-15 NOTE — Clinical Note
New referral.  Patient with fracture of right ankle requiring ORIF. Done by Dr. Margo Bryan in 2001 @ [facility]. Patient now experiencing increasing pain and swelling as well as some neuropathic symptoms.   She inquired about referral.  Thank you

## 2022-06-15 NOTE — PATIENT INSTRUCTIONS
Personalized Preventive Plan for Leonila Wilson - 6/15/2022  Medicare offers a range of preventive health benefits. Some of the tests and screenings are paid in full while other may be subject to a deductible, co-insurance, and/or copay. Some of these benefits include a comprehensive review of your medical history including lifestyle, illnesses that may run in your family, and various assessments and screenings as appropriate. After reviewing your medical record and screening and assessments performed today your provider may have ordered immunizations, labs, imaging, and/or referrals for you. A list of these orders (if applicable) as well as your Preventive Care list are included within your After Visit Summary for your review. Other Preventive Recommendations:    · A preventive eye exam performed by an eye specialist is recommended every 1-2 years to screen for glaucoma; cataracts, macular degeneration, and other eye disorders. · A preventive dental visit is recommended every 6 months. · Try to get at least 150 minutes of exercise per week or 10,000 steps per day on a pedometer . · Order or download the FREE \"Exercise & Physical Activity: Your Everyday Guide\" from [employer]. Call 1-106.419.8312 or search [employer] online. · You need 5128-6909 mg of calcium and 8015-4693 IU of vitamin D per day. It is possible to meet your calcium requirement with diet alone, but a vitamin D supplement is usually necessary to meet this goal.  · When exposed to the sun, use a sunscreen that protects against both UVA and UVB radiation with an SPF of 30 or greater. Reapply every 2 to 3 hours or after sweating, drying off with a towel, or swimming. · Always wear a seat belt when traveling in a car. Always wear a helmet when riding a bicycle or motorcycle.     Preventing Osteoporosis: After Your Visit  Your Care Instructions  Osteoporosis means the bones are weak and thin enough that they can break easily. The older you are, the more likely you are to get osteoporosis. But with plenty of calcium, vitamin D, and exercise, you can help prevent osteoporosis. The preteen and teen years are a key time for bone building. With the help of calcium, vitamin D, and exercise in those early years and beyond, the bones reach their peak density and strength by age 27. After age 27, your bones naturally start to thin and weaken. The stronger your bones are at around age 27, the lower your risk for osteoporosis. But no matter what your age and risk are, your bones still need calcium, vitamin D, and exercise to stay strong. Also avoid smoking, and limit alcohol. Smoking and heavy alcohol use can make your bones thinner. Talk to your doctor about any special risks you might have, such as having a close relative with osteoporosis or taking a medicine that can weaken bones. Your doctor can tell you the best ways to protect your bones from thinning. Follow-up care is a key part of your treatment and safety. Be sure to make and go to all appointments, and call your doctor if you are having problems. It's also a good idea to know your test results and keep a list of the medicines you take. How can you care for yourself at home? Get enough calcium and vitamin D. The [employer] recommends adults younger than age 46 need 1,000 mg of calcium and 600 IU of vitamin D each day. Women ages 46 to 79 need 1,200 mg of calcium and 600 IU of vitamin D each day. Men ages 46 to 79 need 1,000 mg of calcium and 600 IU of vitamin D each day. Adults 71 and older need 1,200 mg of calcium and 800 IU of vitamin D each day. Eat foods rich in calcium, like yogurt, cheese, milk, and dark green vegetables. Eat foods rich in vitamin D, like eggs, fatty fish, cereal, and fortified milk. Get some sunshine. Your body uses sunshine to make its own vitamin D.  The safest time to be out in the sun is before 10 a.m. or after 3 p.m. Avoid getting sunburned. Sunburn can increase your risk of skin cancer. Talk to your doctor about taking a calcium plus vitamin D supplement. Ask about what type of calcium is right for you, and how much to take at a time. Adults ages 23 to 48 should not get more than 2,500 mg of calcium and 4,000 IU of vitamin D each day, whether it is from supplements and/or food. Adults ages 46 and older should not get more than 2,000 mg of calcium and 4,000 IU of vitamin D each day from supplements and/or food. Get regular bone-building exercise. Weight-bearing and resistance exercises keep bones healthy by working the muscles and bones against gravity. Start out at an exercise level that feels right for you. Add a little at a time until you can do the following:  Do 30 minutes of weight-bearing exercise on most days of the week. Walking, jogging, stair climbing, and dancing are good choices. Do resistance exercises with weights or elastic bands 2 to 3 days a week. Limit alcohol. Drink no more than 1 alcohol drink a day if you are a woman. Drink no more than 2 alcohol drinks a day if you are a man. Do not smoke. Smoking can make bones thin faster. If you need help quitting, talk to your doctor about stop-smoking programs and medicines. These can increase your chances of quitting for good. When should you call for help? Watch closely for changes in your health, and be sure to contact your doctor if:  You need help with a healthy eating plan. You need help with an exercise plan    © 6420-6117 [employer]. Care instructions adapted under license by [facility]. This care instruction is for use with your licensed healthcare professional. If you have questions about a medical condition or this instruction, always ask your healthcare professional. [place] [address] any warranty or liability for your use of this information. Content Version: 9.4.55200;  Last Revised: June 20, 2011              ·     Keep Your Memory Tenzin Pulido       Many factors can affect your ability to remembera hectic lifestyle, aging, stress, chronic disease, and certain medicines. But, there are steps you can take to sharpen your mind and help preserve your memory. Challenge Your Brain   Regularly challenging your mind may help keeps it in top shape. Good mental exercises include:   Crossword puzzlesUse a dictionary if you need it; you will learn more that way. Brainteasers Try some! Crafts, such as wood working and sewing   Hobbies, such as gardening and building model airplanes   SocializingVisit old friends or join groups to meet new ones. Reading   Learning a new language   Taking a class, whether it be art history or uzair chi   TravelingExperience the food, history, and culture of your destination   Learning to use a computer   Going to museums, the theater, or thought-provoking movies   Changing things in your daily life, such as reversing your pattern in the grocery store or brushing your teeth using your nondominant hand   Use Memory Aids   There is no need to remember every detail on your own. These memory aids can help:   Calendars and day planners   Electronic organizers to store all sorts of helpful informationThese devices can \"beep\" to remind you of appointments. A book of days to record birthdays, anniversaries, and other occasions that occur on the same date every year   Detailed \"to-do\" lists and strategically placed sticky notes   Quick \"study\" sessionsBefore a gathering, review who will be there so their names will be fresh in your mind. Establish routinesFor example, keep your keys, wallet, and umbrella in the same place all the time or take medicine with your 8:00 AM glass of juice   Live a Healthy Life   Many actions that will keep your body strong will do the same for your mind.  For example:   Talk to Your Doctor About Herbs and Supplements    Malnutrition and vitamin deficiencies can impair your mental function. For example, vitamin B12 deficiency can cause a range of symptoms, including confusion. But, what if your nutritional needs are being met? Can herbs and supplements still offer a benefit? Researchers have investigated a range of natural remedies, such as ginkgo , ginseng , and the supplement phosphatidylserine (PS). So far, though, the evidence is inconsistent as to whether these products can improve memory or thinking. If you are interested in taking herbs and supplements, talk to your doctor first because they may interact with other medicines that you are taking. Exercise Regularly    Among the many benefits of regular exercise are increased blood flow to the brain and decreased risk of certain diseases that can interfere with memory function. One study found that even moderate exercise has a beneficial effect. Examples of \"moderate\" exercise include:   Playing 18 holes of golf once a week, without a cart   Playing tennis twice a week   Walking one mile per day   Manage Stress    It can be tough to remember what is important when your mind is cluttered. Make time for relaxation. Choose activities that calm you down, and make it routine. Manage Chronic Conditions    Side effects of high blood pressure , diabetes, and heart disease can interfere with mental function. Many of the lifestyle steps discussed here can help manage these conditions. Strive to eat a healthy diet, exercise regularly, get stress under control, and follow your doctor's advice for your condition. Minimize Medications    Talk to your doctor about the medicines that you take. Some may be unnecessary. Also, healthy lifestyle habits may lower the need for certain drugs.      Last Reviewed: April 2010 Wagner Jean MD   Updated: 4/13/2010   ·     [address] [place] [address]       As we get older, changes in balance, gait, strength, vision, hearing, and cognition make even the most youthful senior more prone to accidents. Falls are one of the leading health risks for older people. This increased risk of falling is related to:   Aging process (eg, decreased muscle strength, slowed reflexes)   Higher incidence of chronic health problems (eg, arthritis, diabetes) that may limit mobility, agility or sensory awareness   Side effects of medicine (eg, dizziness, blurred vision)especially medicines like prescription pain medicines and drugs used to treat mental health conditions   Depending on the brittleness of your bones, the consequences of a fall can be serious and long lasting. Home Life   Research by the Association of Aging [facility]) shows that some home accidents among older adults can be prevented by making simple lifestyle changes and basic modifications and repairs to the home environment. Here are some lifestyle changes that experts recommend:   Have your hearing and vision checked regularly. Be sure to wear prescription glasses that are right for you. Speak to your doctor or pharmacist about the possible side effects of your medicines. A number of medicines can cause dizziness. If you have problems with sleep, talk to your doctor. Limit your intake of alcohol. If necessary, use a cane or walker to help maintain your balance. Wear supportive, rubber-soled shoes, even at home. If you live in a region that gets wintry weather, you may want to put special cleats on your shoes to prevent you from slipping on the snow and ice. Exercise regularly to help maintain muscle tone, agility, and balance. Always hold the banister when going up or down stairs. Also, use  bars when getting in or out of the bath or shower, or using the toilet. To avoid dizziness, get up slowly from a lying down position. Sit up first, dangling your legs for a minute or two before rising to a standing position.    Overall Home Safety Check   According to the Consumer [employer] Safety Commision's \"Older Consumer Home Safety Checklist,\" it is important to check for potential hazards in each room. And remember, proper lighting is an essential factor in home safety. If you cannot see clearly, you are more likely to fall. Important questions to ask yourself include:   Are lamp, electric, extension, and telephone cords placed out of the flow of traffic and maintained in good condition? Have frayed cords been replaced? Are all small rugs and runners slip resistant? If not, you can secure them to the floor with a special double-sided carpet tape. Are smoke detectors properly locatedone on every floor of your home and one outside of every sleeping area? Are they in good working order? Are batteries replaced at least once a year? Do you have a well-maintained carbon monoxide detector outside every sleeping are in your home? Does your furniture layout leave plenty of space to maneuver between and around chairs, tables, beds, and sofas? Are hallways, stairs and passages between rooms well lit? Can you reach a lamp without getting out of bed? Are floor surfaces well maintained? Shag rugs, high-pile carpeting, tile floors, and polished wood floors can be particularly slippery. Stairs should always have handrails and be carpeted or fitted with a non-skid tread. Is your telephone easily reachable. Is the cord safely tucked away? Room by Room   According to the Association of Aging, bathrooms and demetra are the two most potentially hazardous rooms in your home. In the Kitchen    Be sure your stove is in proper working order and always make sure burners and the oven are off before you go out or go to sleep. Keep pots on the back burners, turn handles away from the front of the stove, and keep stove clean and free of grease build-up. Kitchen ventilation systems and range exhausts should be working properly. Keep flammable objects such as towels and pot holders away from the cooking area except when in use.  Make sure kitchen curtains are tied back. Move cords and appliances away from the sink and hot surfaces. If extension cords are needed, install wiring guides so they do not hang over the sink, range, or working areas. Look for coffee pots, kettles and toaster ovens with automatic shut-offs. Keep a mop handy in the kitchen so you can wipe up spills instantly. You should also have a small fire extinguisher. Arrange your kitchen with frequently used items on lower shelves to avoid the need to stand on a stepstool to reach them. Make sure countertops are well-lit to avoid injuries while cutting and preparing food. In the Bathroom    Use a non-slip mat or decals in the tub and shower, since wet, soapy tile or porcelain surfaces are extremely slippery. Make sure bathroom rugs are non-skid or tape them firmly to the floor. Bathtubs should have at least one, preferably two, grab bars, firmly attached to structural supports in the wall. (Do not use built-in soap holders or glass shower doors as grab bars.)    Tub seats fitted with non-slip material on the legs allow you to wash sitting down. For people with limited mobility, bathtub transfer benches allow you to slide safely into the tub. Raised toilet seats and toilet safety rails are helpful for those with knee or hip problems. In the [facility]    Make sure you use a nightlight and that the area around your bed is clear of potential obstacles. Be careful with electric blankets and never go to sleep with a heating pad, which can cause serious burns even if on a low setting. Use fire-resistant mattress covers and pillows, and NEVER smoke in bed. Keep a phone next to the bed that is programmed to dial 911 at the push of a button. If you have a chronic condition, you may want to sign on with an automatic call-in service. Typically the system includes a small pendant that connects directly to an emergency medical voice-response system.  You should also make arrangements to stay in contact with someonefriend, neighbor, family memberon a regular schedule. Fire Prevention   According to the [employer]. (Smoke Alarms for Every) [facility] [facility], senior citizens are one of the two highest risk groups for death and serious injuries due to residential fires. When cooking, wear short-sleeved items, never a bulky long-sleeved robe. The [facility]'s Safety Checklist for Older Consumers emphasizes the importance of checking basements, garages, workshops and storage areas for fire hazards, such as volatile liquids, piles of old rags or clothing and overloaded circuits. Never smoke in bed or when lying down on a couch or recliner chair. Small portable electric or kerosene heaters are responsible for many home fires and should be used cautiously if at all. If you do use one, be sure to keep them away from flammable materials. In case of fire, make sure you have a pre-established emergency exit plan. Have a professional check your fireplace and other fuel-burning appliances yearly. [employer]   Baby boomers entering the cabrera years will continue to see the development of new products to help older adults live safely and independently in spite of age-related changes. Making Life More Livable  , by  [employer], lists over 1,000 products for \"living well in the mature years,\" such as bathing and mobility aids, household security devices, ergonomically designed knives and peelers, and faucet valves and knobs for temperature control. Medical supply stores and organizations are good sources of information about products that improve your quality of life and insure your safety.      Last Reviewed: November 2009 Alexx Ndiaye MD   Updated: 3/7/2011     ·

## 2022-06-15 NOTE — PROGRESS NOTES
Medicare Annual Wellness Visit    Raleigh Samaniego is here for Medicare AWV    Assessment & Plan   Medicare annual wellness visit, subsequent: Exam completed; patient vehemently does not want to discuss ways to increase activity/exercise    Foot pain, right  -     Darnell Higginbotham, PodiatryDian    History of fracture of right ankle  -     R Marjorie 98, DPM, Podiatry, [place]      Recommendations for CME Due: see orders and patient instructions/AVS.  Recommended screening schedule for the next 5-10 years is provided to the patient in written form: see Patient Instructions/AVS.     Return for 1) Keep scheduled appoinment; 2) Medicare Annual Wellness Visit in 1 year. Subjective   The following acute and/or chronic problems were also addressed today:  She has concerns about pain and swelling of right foot/ankle. She is S/P fracture of right ankle and S/P ORIF (Dr. Francis Soriano, 2001)  She is requesting referral to Podiatry, orthopedics to assess. Patient's complete Health Risk Assessment and screening values have been reviewed and are found in Flowsheets. The following problems were reviewed today and where indicated follow up appointments were made and/or referrals ordered.     Positive Risk Factor Screenings with Interventions:             General Health and ACP:  General  In general, how would you say your health is?: Good  In the past 7 days, have you experienced any of the following: New or Increased Pain, New or Increased Fatigue, Loneliness, Social Isolation, Stress or Anger?: (!) Yes  Select all that apply: (!) New or Increased Pain  Do you get the social and emotional support that you need?: Yes  Do you have a Living Will?: Yes    Advance Directives     Power of  Living Will ACP-Advance Directive ACP-Power of     Not on File Filed on 03/30/13 Filed Not on File      General Health Risk Interventions:  · Pain issues: home exercises provided    Health Habits/Nutrition:     Physical Activity: Inactive    Days of Exercise per Week: 0 days    Minutes of Exercise per Session: 0 min     Have you lost any weight without trying in the past 3 months?: (!) Yes  Body mass index: (!) 29.81  Have you seen the dentist within the past year?: Yes    Health Habits/Nutrition Interventions:  · Inadequate physical activity:  patient is not ready to increase his/her physical activity level at this time             Objective   Vitals:    06/15/22 1339   BP: 136/86   Pulse: 85   Resp: 16   Temp: 97.4 °F (36.3 °C)   TempSrc: Infrared   SpO2: 96%   Weight: 163 lb (73.9 kg)   Height: 5' 2\" (1.575 m)      Body mass index is 29.81 kg/m².         General Appearance: alert and oriented to person, place and time, well developed and well- nourished, in no acute distress  Skin: warm and dry, no rash or erythema  Head: normocephalic and atraumatic  Eyes: pupils equal, round, and reactive to light, extraocular eye movements intact, conjunctivae normal  ENT: tympanic membrane, external ear and ear canal normal bilaterally, nose without deformity, nasal mucosa and turbinates normal without polyps  Neck: supple and non-tender without mass, no thyromegaly or thyroid nodules, no cervical lymphadenopathy  Pulmonary/Chest: clear to auscultation bilaterally- no wheezes, rales or rhonchi, normal air movement, no respiratory distress  Cardiovascular: normal rate, regular rhythm, normal S1 and S2, no murmurs, rubs, clicks, or gallops, distal pulses intact, no carotid bruits  Abdomen: soft, non-tender, non-distended, normal bowel sounds, no masses or organomegaly  Extremities: no cyanosis, clubbing or edema  Musculoskeletal: normal range of motion, no joint swelling, deformity or tenderness  Neurologic: reflexes normal and symmetric, no cranial nerve deficit, gait, coordination and speech normal       Allergies   Allergen Reactions    Albuterol Swelling    Atrovent Inhaler Swelling    Niacin And Related Swelling    Codeine Rash     Codeine And Related     Prior to Visit Medications    Medication Sig Taking?  Authorizing Provider   Cholecalciferol (VITAMIN D-3) 25 MCG (1000 UT) CAPS Take 1 capsule Monday Wednesday Friday, off all other days Yes Giovanni Guadalupe MD   aspirin 81 MG chewable tablet Take 1 tablet by mouth daily Indications: Paroxysmal Atrial Fibrillation Yes Giovanni Guadalupe MD   benazepril (LOTENSIN) 40 MG tablet TAKE 1 TABLET BY MOUTH EVERY DAY FOR HIGH BLOOD PRESSURE Yes Shanta Hogan MD   dilTIAZem (TIAZAC) 360 MG extended release capsule Take 1 capsule by mouth daily Yes Giovanni Guadalupe MD   fish oil-omega-3 fatty acids 1000 MG capsule Take 2 capsules by mouth 2 times daily Yes Giovanni Guadalupe MD   fluticasone (FLONASE) 50 MCG/ACT nasal spray 2 sprays by Nasal route daily 2 sprays in each nostril daily Yes Shanta Hogan MD   hydroCHLOROthiazide (HYDRODIURIL) 25 MG tablet TAKE 1 TABLET BY MOUTH EVERY DAY FOR HIGH BLOOD PRESSURE Yes Shanta Hogan MD   potassium chloride (KLOR-CON M10) 10 MEQ extended release tablet TAKE 1 TABLET BY MOUTH EVERY DAY Yes Shanta Hogan MD   simvastatin (ZOCOR) 20 MG tablet Take 1 tablet by mouth nightly Indications: High Amount of Fats in the Blood Take 1 tablet nightly Yes Shanta Hogan MD   SYNTHROID 112 MCG tablet TAKE 1 TABLET BY MOUTH EVERY DAY ON AN EMPTY STOMACH Yes Shanta Hogan MD   Calcium Carbonate-Vit D-Min (CALTRATE PLUS PO) Take 1,200 mg by mouth 2 times daily Yes Historical Provider, MD   Coenzyme Q10 (CO Q 10) 100 MG CAPS Take 200 mg by mouth daily Yes Historical Provider, MD [place] (Including outside providers/suppliers regularly involved in providing care):   Patient Care Team:  Giovanni Guadalupe MD as PCP - General (Family Medicine)  Giovanni Guadalupe MD as PCP - [facility] Empaneled Provider  Francee Osgood [address] [place] [address], DO as Surgeon (Otolaryngology)  Terrell Pérez MD as Consulting Physician (Dermatology)  Kiran Artis MD as Surgeon (Trauma Surgery)     Reviewed and updated this visit:  Tobacco  Allergies  Meds  Med Hx  Surg Hx  Soc Hx  Fam Hx          Assessment / Plan:      Marissa Cancino was seen today for medicare awv. Diagnoses and all orders for this visit:    Medicare annual wellness visit, subsequent: Exam completed; patient vehemently does not want to discuss ways to increase activity/exercise      Foot pain, right: S/P remote ankle fracture with ORIF. Patient requesting Podiatric referral.  -     Denzel Edward DPM, PodiatrySeema    History of fracture of right ankle: S/P remote ankle fracture with ORIF. Patient requesting Podiatric referral.  -     Denzel Edward DPM, PodiatrSeema luz            Follow up:  Return for 1) Keep scheduled appoinment; 2) Medicare Annual Wellness Visit in 1 year.        Aminata Connelly MD

## 2022-06-27 DIAGNOSIS — M25.571 RIGHT ANKLE PAIN, UNSPECIFIED CHRONICITY: ICD-10-CM

## 2022-06-27 DIAGNOSIS — M79.671 RIGHT FOOT PAIN: Primary | ICD-10-CM

## 2022-06-30 ENCOUNTER — OFFICE VISIT (OUTPATIENT)
Dept: PODIATRY | Age: 77
End: 2022-06-30
Payer: MEDICARE

## 2022-06-30 VITALS — BODY MASS INDEX: 29.08 KG/M2 | HEIGHT: 62 IN | WEIGHT: 158 LBS

## 2022-06-30 DIAGNOSIS — M12.571 TRAUMATIC ARTHRITIS OF RIGHT ANKLE: Primary | ICD-10-CM

## 2022-06-30 DIAGNOSIS — R26.2 DIFFICULTY WALKING: ICD-10-CM

## 2022-06-30 DIAGNOSIS — M25.571 PAIN IN RIGHT ANKLE AND JOINTS OF RIGHT FOOT: ICD-10-CM

## 2022-06-30 PROCEDURE — 1123F ACP DISCUSS/DSCN MKR DOCD: CPT | Performed by: PODIATRIST

## 2022-06-30 PROCEDURE — 99203 OFFICE O/P NEW LOW 30 MIN: CPT | Performed by: PODIATRIST

## 2022-06-30 NOTE — PROGRESS NOTES
Patient is in today for evaluation of right foot/ankle pain. Patient says it has been hurting and having some swelling.  pcp is Nasreen Hawkins MD  Last ov 6/15/22

## 2022-07-01 NOTE — PROGRESS NOTES
22     Hilda Zendejas    : 1945 Sex: female   Age: 77 y.o. Patient was referred by: Jcarlos Rachel MD  Patient's PCP/Provider is:  Dante Rachel MD    Subjective:    Patient seen today for evaluation regarding chronic pain and swelling into her right ankle. Chief Complaint   Patient presents with    Foot Pain     right foot/ankle        HPI: Stated issues have been going on for several years into her right ankle after her initial and surgical ORIF. And stated the issues have been getting progressively worse recently. She denies any additional issues at this time. ROS:  Const: Positives and pertinent negatives as per HPI. Musculo: Denies symptoms other than stated above. Neuro: Denies symptoms other than stated above. Skin: Denies symptoms other than stated above.     Current Medications:    Current Outpatient Medications:     Cholecalciferol (VITAMIN D-3) 25 MCG (1000 UT) CAPS, Take 1 capsule , off all other days, Disp: 45 capsule, Rfl: 3    aspirin 81 MG chewable tablet, Take 1 tablet by mouth daily Indications: Paroxysmal Atrial Fibrillation, Disp: 90 tablet, Rfl: 3    benazepril (LOTENSIN) 40 MG tablet, TAKE 1 TABLET BY MOUTH EVERY DAY FOR HIGH BLOOD PRESSURE, Disp: 90 tablet, Rfl: 3    dilTIAZem (TIAZAC) 360 MG extended release capsule, Take 1 capsule by mouth daily, Disp: 90 capsule, Rfl: 3    fish oil-omega-3 fatty acids 1000 MG capsule, Take 2 capsules by mouth 2 times daily, Disp: 360 capsule, Rfl: 3    fluticasone (FLONASE) 50 MCG/ACT nasal spray, 2 sprays by Nasal route daily 2 sprays in each nostril daily, Disp: 18.2 each, Rfl: 3    hydroCHLOROthiazide (HYDRODIURIL) 25 MG tablet, TAKE 1 TABLET BY MOUTH EVERY DAY FOR HIGH BLOOD PRESSURE, Disp: 90 tablet, Rfl: 3    potassium chloride (KLOR-CON M10) 10 MEQ extended release tablet, TAKE 1 TABLET BY MOUTH EVERY DAY, Disp: 90 tablet, Rfl: 3    simvastatin (ZOCOR) 20 MG tablet, Take 1 tablet by mouth nightly Indications: High Amount of Fats in the Blood Take 1 tablet nightly, Disp: 90 tablet, Rfl: 3    SYNTHROID 112 MCG tablet, TAKE 1 TABLET BY MOUTH EVERY DAY ON AN EMPTY STOMACH, Disp: 90 tablet, Rfl: 3    Calcium Carbonate-Vit D-Min (CALTRATE PLUS PO), Take 1,200 mg by mouth 2 times daily, Disp: , Rfl:     Coenzyme Q10 (CO Q 10) 100 MG CAPS, Take 200 mg by mouth daily, Disp: , Rfl:     Allergies: Allergies   Allergen Reactions    Albuterol Swelling    Atrovent Inhaler Swelling    Niacin And Related Swelling    Codeine Rash     Codeine And Related       Vitals:    22 1333   Weight: 158 lb (71.7 kg)   Height: 5' 2\" (1.575 m)        Past Medical History:   Diagnosis Date    Atrial fibrillation (HCC)     Gastritis with hemorrhage     Hyperlipidemia     Hypertension     Hypothyroidism     Obesity (BMI 30.0-34. 9) 2017    Paroxysmal atrial fibrillation (HCC)     Traumatic arthritis of right ankle 2022     Family History   Problem Relation Age of Onset    Cancer Mother          at 61 because of lung cancer    Heart Disease Father          at 79 secondary to MI    Diabetes Sister 77     Past Surgical History:   Procedure Laterality Date    ANKLE SURGERY  2001    right    CARPAL TUNNEL RELEASE  's    bilateral    COLONOSCOPY  2008    diverticulosis internal and external hemorrhoids.  COLONOSCOPY  13    DENTAL SURGERY      EYE SURGERY      FRACTURE SURGERY Right 2001    ORIF right ankle    HEMORRHOID SURGERY  2004    HEMORRHOID SURGERY  6/1/15    MYRINGOTOMY AND TYMPANOSTOMY TUBE PLACEMENT Right 2015    MYRINGOTOMY AND TYMPANOSTOMY TUBE PLACEMENT Right 2019    EVALUATION UNDER ANESTHESIA CLEAN RIGHT EAR performed by Mehrdad Muñoz DO at ini 22 SEPTOPLASTY  2013    and tonia ear exam    SKIN BIOPSY  2016    ear    UPPER GASTROINTESTINAL ENDOSCOPY  2008    gastric ulcer.  Dr. Randa Pascal Social History     Tobacco Use    Smoking status: Never Smoker    Smokeless tobacco: Never Used   Vaping Use    Vaping Use: Never used   Substance Use Topics    Alcohol use: Not Currently     Alcohol/week: 0.0 standard drinks    Drug use: Never           Diagnostic studies:    No results found. Procedures:    None    Exam:  VASCULAR: Pulses palpable bilateral foot. NEUROLOGICAL: Epicritic sensations intact and symmetrical  DERMATOLOGICAL: Edema noted with varicosities and stasis skin changes present right ankle. No skin abrasions or any signs of infection noted right foot. MUSCULOSKELETAL: Limited range of motion noted right ankle. Edema noted right lower extremity. Antalgic gait noted upon evaluation. Plan Per Assessment  Karina Sevilla was seen today for foot pain. Diagnoses and all orders for this visit:    Traumatic arthritis of right ankle    Pain in right ankle and joints of right foot    Difficulty walking        1. New patient evaluation and management  2. Radiographs were reviewed with patient today. Traumatic arthritic issues noted right ankle. 3. Discussed conservative care options at this time. Ankle support was dispensed for added support and edema reduction right lower extremity. 4. Patient was advised on appropriate foot care techniques. Have continued follow-up until issues are improved. Seen By:    Bibiana Swartz DPM    Electronically signed by Bibiana Swartz DPM on 6/30/2022 at 9:45 PM      This note was created using voice recognition software. The note was reviewed however may contain grammatical errors.

## 2022-07-21 ENCOUNTER — OFFICE VISIT (OUTPATIENT)
Dept: PODIATRY | Age: 77
End: 2022-07-21
Payer: MEDICARE

## 2022-07-21 VITALS — HEIGHT: 62 IN | BODY MASS INDEX: 30.36 KG/M2 | WEIGHT: 165 LBS

## 2022-07-21 DIAGNOSIS — M25.571 PAIN IN RIGHT ANKLE AND JOINTS OF RIGHT FOOT: ICD-10-CM

## 2022-07-21 DIAGNOSIS — M12.571 TRAUMATIC ARTHRITIS OF RIGHT ANKLE: Primary | ICD-10-CM

## 2022-07-21 DIAGNOSIS — M65.9 SYNOVITIS OF RIGHT ANKLE: ICD-10-CM

## 2022-07-21 DIAGNOSIS — R26.2 DIFFICULTY WALKING: ICD-10-CM

## 2022-07-21 PROBLEM — M65.971 SYNOVITIS OF RIGHT ANKLE: Status: ACTIVE | Noted: 2022-07-21

## 2022-07-21 PROCEDURE — 1123F ACP DISCUSS/DSCN MKR DOCD: CPT | Performed by: PODIATRIST

## 2022-07-21 PROCEDURE — 99213 OFFICE O/P EST LOW 20 MIN: CPT | Performed by: PODIATRIST

## 2022-07-21 NOTE — PROGRESS NOTES
Patient is in today for 3 week right ankle follow up. Patient says the ankle brace does not help and she is still having some pain.  Pcp is Tab Conteh MD  Last ov 6/15/22

## 2022-07-22 NOTE — PROGRESS NOTES
22     Vivian Garcia    : 1945   Sex: female    Age: 77 y.o. Patient's PCP/Provider is:  Keyonna Archer MD    Subjective:  Patient is seen today for follow-up regarding continued care regarding chronic arthritis into the right ankle and midfoot regions. Patient stated the OTC ankle support did not relieve her symptoms, and actually was hard to apply and wear throughout the day. She wanted to discuss other potential treatment options available. No other additional abnormalities noted. Chief Complaint   Patient presents with    Ankle Pain     Right ankle        ROS:  Const: Positives and pertinent negatives as per HPI. Musculo: Denies symptoms other than stated above. Neuro: Denies symptoms other than stated above. Skin: Denies symptoms other than stated above.     Current Medications:    Current Outpatient Medications:     Cholecalciferol (VITAMIN D-3) 25 MCG (1000 UT) CAPS, Take 1 capsule , off all other days, Disp: 45 capsule, Rfl: 3    aspirin 81 MG chewable tablet, Take 1 tablet by mouth daily Indications: Paroxysmal Atrial Fibrillation, Disp: 90 tablet, Rfl: 3    benazepril (LOTENSIN) 40 MG tablet, TAKE 1 TABLET BY MOUTH EVERY DAY FOR HIGH BLOOD PRESSURE, Disp: 90 tablet, Rfl: 3    dilTIAZem (TIAZAC) 360 MG extended release capsule, Take 1 capsule by mouth daily, Disp: 90 capsule, Rfl: 3    fish oil-omega-3 fatty acids 1000 MG capsule, Take 2 capsules by mouth 2 times daily, Disp: 360 capsule, Rfl: 3    fluticasone (FLONASE) 50 MCG/ACT nasal spray, 2 sprays by Nasal route daily 2 sprays in each nostril daily, Disp: 18.2 each, Rfl: 3    hydroCHLOROthiazide (HYDRODIURIL) 25 MG tablet, TAKE 1 TABLET BY MOUTH EVERY DAY FOR HIGH BLOOD PRESSURE, Disp: 90 tablet, Rfl: 3    potassium chloride (KLOR-CON M10) 10 MEQ extended release tablet, TAKE 1 TABLET BY MOUTH EVERY DAY, Disp: 90 tablet, Rfl: 3    simvastatin (ZOCOR) 20 MG tablet, Take 1 tablet by mouth nightly Indications: High Amount of Fats in the Blood Take 1 tablet nightly, Disp: 90 tablet, Rfl: 3    SYNTHROID 112 MCG tablet, TAKE 1 TABLET BY MOUTH EVERY DAY ON AN EMPTY STOMACH, Disp: 90 tablet, Rfl: 3    Calcium Carbonate-Vit D-Min (CALTRATE PLUS PO), Take 1,200 mg by mouth 2 times daily, Disp: , Rfl:     Coenzyme Q10 (CO Q 10) 100 MG CAPS, Take 200 mg by mouth daily, Disp: , Rfl:     Allergies: Allergies   Allergen Reactions    Albuterol Swelling    Atrovent Inhaler Swelling    Niacin And Related Swelling    Codeine Rash     Codeine And Related       Vitals:    07/21/22 1314   Weight: 165 lb (74.8 kg)   Height: 5' 2\" (1.575 m)       Exam:  Neurovascular status unchanged. Tenderness noted into the anterior right ankle joint region with attempted range of motion and muscle testing performed. Mild residual edema noted into the right hindfoot and ankle region, without ecchymotic skin changes present. Antalgic gait noted upon evaluation. Diagnostic Studies:   None  XR ANKLE RIGHT (MIN 3 VIEWS)    Result Date: 7/1/2022  EXAMINATION: THREE XRAY VIEWS OF THE RIGHT ANKLE 6/30/2022 1:09 pm COMPARISON: None. Correlated with right foot radiographs from February 11, 2013. HISTORY: ORDERING SYSTEM PROVIDED HISTORY: Right ankle pain, unspecified chronicity FINDINGS: There are stable postsurgical changes related to plate and screw fixation of the distal fibula and cannulated screw of placement through the medial malleolus. No evidence of acute hardware complication or obvious screw loosening. There is no significant change in the appearance of the surgical construct compared with the February 2013 exam.  Soft tissue swelling is noted diffusely throughout the visualized distal right calf and right ankle. There are moderate degenerative changes of the tibiotalar joint with severe degenerative changes of the talonavicular joint and additional moderate degenerative changes of the partially imaged midfoot.   Moderate calcaneal spurring is noted on the plantar and Achilles surfaces. Stable postsurgical changes to the right ankle, as above. No acute osseous abnormality. Diffuse subcutaneous edema throughout the imaged right calf and surrounding the right ankle. XR FOOT RIGHT (MIN 3 VIEWS)    Result Date: 7/1/2022  EXAMINATION: THREE XRAY VIEWS OF THE RIGHT FOOT 6/30/2022 1:09 pm COMPARISON: None. HISTORY: ORDERING SYSTEM PROVIDED HISTORY: Right foot pain FINDINGS: This study is a continuation of the x-ray series of the right ankle which has been reported. At the level of the foot there is diffuse soft tissue swelling extend from the distal right lower extremity in through the ankle area and the particular towards the distal metatarsal region dorsal aspect. There is some dorsal flexion position for the phalanxes of 2nd, 3rd, 4th, and 5th toes. No significant degenerative changes are seen in the interphalangeal joints of these toes. Discrete degenerative changes seen in the 1st metatarsal-phalangeal joint. Visualized bones have normal cortical bone contouring trabecular bone texture. Degenerative cortical marginal spurs are seen in the level of the cuneiforms and in the talus with degenerative changes in the talar navicular and naviculocuneiform joints. Calcaneal spur is seen for the Achilles and plantar fascia attachment. For findings in the ankle and in relation to the internal fixation see report of the x-ray series of the right ankle performed same location. Degenerative changes predominant in the dorsal aspect of the intertarsal joints and tarsal-metatarsal joints. Mild diffuse soft tissue swelling in the distal right lower extremity. Procedures:    None    Plan Per Assessment  Esvin Gold was seen today for ankle pain.     Diagnoses and all orders for this visit:    Traumatic arthritis of right ankle  -     Amb External Referral For Orthotics    Synovitis of right ankle  -     Amb External Referral For Orthotics    Pain in right ankle and joints of right foot  -     Amb External Referral For Orthotics    Difficulty walking  -     Amb External Referral For Orthotics      Evaluation and management  Did discuss getting patient evaluated for an AFO device right lower extremity to treat the underlying arthritic issues into the right hindfoot and ankle regions. We did discuss continued use of her good supportive shoe gear with every day activities. Patient will be followed up at a later date for continued evaluation and management. Seen By:    Caryn Dakins, DPM    Electronically signed by Caryn Dakins, DPM on 7/22/2022 at 7:41 AM    This note was created using voice recognition software. The note was reviewed however may contain grammatical errors.

## 2022-08-12 ENCOUNTER — OFFICE VISIT (OUTPATIENT)
Dept: FAMILY MEDICINE CLINIC | Age: 77
End: 2022-08-12
Payer: MEDICARE

## 2022-08-12 VITALS
HEART RATE: 60 BPM | WEIGHT: 160 LBS | TEMPERATURE: 97.3 F | OXYGEN SATURATION: 95 % | RESPIRATION RATE: 16 BRPM | HEIGHT: 62 IN | BODY MASS INDEX: 29.44 KG/M2 | SYSTOLIC BLOOD PRESSURE: 120 MMHG | DIASTOLIC BLOOD PRESSURE: 66 MMHG

## 2022-08-12 DIAGNOSIS — Z12.31 ENCOUNTER FOR SCREENING MAMMOGRAM FOR MALIGNANT NEOPLASM OF BREAST: ICD-10-CM

## 2022-08-12 DIAGNOSIS — L30.9 DERMATITIS: Primary | ICD-10-CM

## 2022-08-12 PROCEDURE — 1123F ACP DISCUSS/DSCN MKR DOCD: CPT | Performed by: FAMILY MEDICINE

## 2022-08-12 PROCEDURE — 99214 OFFICE O/P EST MOD 30 MIN: CPT | Performed by: FAMILY MEDICINE

## 2022-08-12 RX ORDER — SODIUM FLUORIDE 1.1 G/100G
GEL, DENTIFRICE ORAL
COMMUNITY
Start: 2022-06-29

## 2022-08-12 ASSESSMENT — ENCOUNTER SYMPTOMS
ABDOMINAL PAIN: 0
WHEEZING: 0
DIARRHEA: 0
CONSTIPATION: 0
VOMITING: 0
BACK PAIN: 0
SHORTNESS OF BREATH: 0
NAUSEA: 0
COUGH: 0
SORE THROAT: 0
BLOOD IN STOOL: 0

## 2022-08-12 NOTE — PROGRESS NOTES
Dottie Lopez is a 77 y.o. female who presents today for     Chief Complaint   Patient presents with    Rash     Having rash off and on for a few months, itchy, used cortizone cream, asper cream, alcohol, calamine lotion, and triamcinolone cream. Does help the itch a little bit but not for very long. Rash:   Patient complains of rash involving the  generalized body  . Rash started several months ago. Appearance of rash at onset: Other appearance: red base with wheal like lesion. Rash has changed over time; it is currently resolved. Discomfort associated with rash: is pruritic. Associated symptoms: none. Denies:  all of the above . Patient has not had previous evaluation of rash. Patient has not had previous treatment; OTC cortisone creams helped with itching. Response to treatment: as above. Patient has not had contacts with similar rash. Patient has not identified precipitant. Patient has not had new exposures (soaps, lotions, laundry detergents, foods, medications, plants, insects or animals.) She reports that she had had her apartment inspected for bedbugs 6/22 and no infestation was found. She inquired about a systemic cause for rash. No previous diagnosis of autoimmune disorder. [facility]:  Patient's past medical, surgical, social and/or family history reviewed, updated in chart, and are non-contributory (unless otherwise stated). Medications and allergies also reviewed and updated in chart. Review of Systems  Review of Systems   HENT:  Negative for congestion, ear pain and sore throat. Respiratory:  Negative for cough, shortness of breath and wheezing. Cardiovascular:  Negative for chest pain, palpitations and leg swelling. Gastrointestinal:  Negative for abdominal pain, blood in stool, constipation, diarrhea, nausea and vomiting. Genitourinary:  Negative for dysuria, frequency, hematuria and urgency. Musculoskeletal:  Negative for back pain, myalgias and neck pain.    Skin: Negative for rash. Neurological:  Negative for dizziness, weakness and headaches. Psychiatric/Behavioral:  The patient is not nervous/anxious. Physical Exam:    VS:  /66   Pulse 60   Temp 97.3 °F (36.3 °C) (Infrared)   Resp 16   Ht 5' 2\" (1.575 m)   Wt 160 lb (72.6 kg)   SpO2 95%   BMI 29.26 kg/m²     LAST WEIGHT:  Wt Readings from Last 3 Encounters:   08/12/22 160 lb (72.6 kg)   07/21/22 165 lb (74.8 kg)   06/30/22 158 lb (71.7 kg)       BMI Readings from Last 3 Encounters:   08/12/22 29.26 kg/m²   07/21/22 30.18 kg/m²   06/30/22 28.90 kg/m²       Physical Exam  Constitutional:       General: She is not in acute distress. Appearance: She is well-developed. She is not diaphoretic. HENT:      Head: Normocephalic and atraumatic. Right Ear: External ear normal.      Left Ear: External ear normal.      Mouth/Throat:      Pharynx: No oropharyngeal exudate. Eyes:      General: No scleral icterus. Right eye: No discharge. Conjunctiva/sclera: Conjunctivae normal.      Pupils: Pupils are equal, round, and reactive to light. Neck:      Thyroid: No thyromegaly. Cardiovascular:      Rate and Rhythm: Normal rate and regular rhythm. Heart sounds: Normal heart sounds. No murmur heard. Pulmonary:      Effort: Pulmonary effort is normal. No respiratory distress. Breath sounds: No stridor. No wheezing or rales. Chest:      Chest wall: No tenderness. Abdominal:      General: Bowel sounds are normal. There is no distension. Palpations: Abdomen is soft. There is no mass. Tenderness: no abdominal tenderness There is no guarding. Musculoskeletal:         General: No tenderness. Normal range of motion. Cervical back: Normal range of motion and neck supple. Lymphadenopathy:      Cervical: No cervical adenopathy. Skin:     General: Skin is warm and dry. Coloration: Skin is not pale. Findings: Rash present. No erythema.           Neurological: Peroxidase Antibody; Future  -     Common Food Allergen Profile; Future  -     Respiratory allergen profile; Future    Encounter for screening mammogram for malignant neoplasm of breast: Due 11/22  -     VIKTOR GIULIANO DIGITAL SCREEN BILATERAL; Future         Follow Up:  Return if symptoms worsen or fail to improve; also to review tests if needed, for Keep scheduled appointment(s). or sooner if necessary. Call or go to ED immediately if symptoms worsen or persist.    Educational materials and/or home exercises printed for patient's review and were included in patient instructions on his/her AfterVisit Summary and given to patient at the end of visit. Counseled regarding above diagnosis,including possible risks and complications,  especially if left uncontrolled. Counseled regarding the possible side effects, risks, benefits and alternatives to treatment; patient and/or guardian verbalizes understanding, agrees, feels comfortable with and wishes to proceed with above treatment plan. Advised patient tocall with any new medication issues, and read all Rx info from pharmacy to assureaware of all possible risks and side effects of medication before taking. Reviewed age and gender appropriate health screening exams and vaccinations. Advisedpatient regarding importance of keeping up with recommended health maintenance andto schedule as soon as possible if overdue, as this is important in assessing forundiagnosed pathology, especially cancer, as well as protecting against potentially harmful/life threatening disease. Patient and/or guardian verbalizes understandingand agrees with above counseling, assessment and plan. All questions answered.     Ashley Marin MD on 8/12/22

## 2022-08-16 LAB
ALBUMIN SERPL-MCNC: NORMAL G/DL
ALP BLD-CCNC: NORMAL U/L
ALT SERPL-CCNC: NORMAL U/L
ANION GAP SERPL CALCULATED.3IONS-SCNC: NORMAL MMOL/L
AST SERPL-CCNC: NORMAL U/L
BASOPHILS ABSOLUTE: NORMAL
BASOPHILS RELATIVE PERCENT: NORMAL
BILIRUB SERPL-MCNC: NORMAL MG/DL
BUN BLDV-MCNC: NORMAL MG/DL
CALCIUM SERPL-MCNC: NORMAL MG/DL
CHLORIDE BLD-SCNC: NORMAL MMOL/L
CHOLESTEROL, TOTAL: NORMAL
CHOLESTEROL/HDL RATIO: NORMAL
CO2: NORMAL
CREAT SERPL-MCNC: NORMAL MG/DL
EOSINOPHILS ABSOLUTE: NORMAL
EOSINOPHILS RELATIVE PERCENT: NORMAL
GFR CALCULATED: NORMAL
GLUCOSE BLD-MCNC: NORMAL MG/DL
HCT VFR BLD CALC: NORMAL %
HDLC SERPL-MCNC: NORMAL MG/DL
HEMOGLOBIN: NORMAL
LDL CHOLESTEROL CALCULATED: NORMAL
LYMPHOCYTES ABSOLUTE: NORMAL
LYMPHOCYTES RELATIVE PERCENT: NORMAL
MCH RBC QN AUTO: NORMAL PG
MCHC RBC AUTO-ENTMCNC: NORMAL G/DL
MCV RBC AUTO: NORMAL FL
MONOCYTES ABSOLUTE: NORMAL
MONOCYTES RELATIVE PERCENT: NORMAL
NEUTROPHILS ABSOLUTE: NORMAL
NEUTROPHILS RELATIVE PERCENT: NORMAL
NONHDLC SERPL-MCNC: NORMAL MG/DL
PDW BLD-RTO: NORMAL %
PLATELET # BLD: NORMAL 10*3/UL
PMV BLD AUTO: NORMAL FL
POTASSIUM SERPL-SCNC: NORMAL MMOL/L
RBC # BLD: NORMAL 10*6/UL
SODIUM BLD-SCNC: NORMAL MMOL/L
T4 TOTAL: NORMAL
TOTAL PROTEIN: NORMAL
TRIGL SERPL-MCNC: NORMAL MG/DL
TSH SERPL DL<=0.05 MIU/L-ACNC: NORMAL M[IU]/L
VLDLC SERPL CALC-MCNC: NORMAL MG/DL
WBC # BLD: NORMAL 10*3/UL

## 2022-08-25 LAB — ANA TITER: NORMAL

## 2022-09-01 DIAGNOSIS — L30.9 DERMATITIS: ICD-10-CM

## 2022-09-01 DIAGNOSIS — I10 ESSENTIAL HYPERTENSION: ICD-10-CM

## 2022-09-01 DIAGNOSIS — E78.2 MIXED HYPERLIPIDEMIA: ICD-10-CM

## 2022-09-01 DIAGNOSIS — E03.9 ACQUIRED HYPOTHYROIDISM: ICD-10-CM

## 2022-09-02 ENCOUNTER — TELEPHONE (OUTPATIENT)
Dept: FAMILY MEDICINE CLINIC | Age: 77
End: 2022-09-02

## 2022-09-02 NOTE — TELEPHONE ENCOUNTER
----- Message from Mary Hinojosa MD sent at 9/1/2022  1:44 PM EDT -----  Please notify Shaunna Connell that her labs were reviewed and were essentially unremarkable. Specifically rheumatoid factor, lupus testing, and allergy testing were all negative.

## 2022-09-02 NOTE — TELEPHONE ENCOUNTER
Contacted patient and informed that lab results and allergy testings all come back within normal range and negative. Patient voiced understanding.

## 2022-09-22 ENCOUNTER — OFFICE VISIT (OUTPATIENT)
Dept: PODIATRY | Age: 77
End: 2022-09-22
Payer: MEDICARE

## 2022-09-22 VITALS — BODY MASS INDEX: 28.89 KG/M2 | WEIGHT: 157 LBS | HEIGHT: 62 IN

## 2022-09-22 DIAGNOSIS — M65.9 SYNOVITIS OF RIGHT ANKLE: ICD-10-CM

## 2022-09-22 DIAGNOSIS — R26.2 DIFFICULTY WALKING: ICD-10-CM

## 2022-09-22 DIAGNOSIS — M12.571 TRAUMATIC ARTHRITIS OF RIGHT ANKLE: Primary | ICD-10-CM

## 2022-09-22 DIAGNOSIS — M25.571 PAIN IN RIGHT ANKLE AND JOINTS OF RIGHT FOOT: ICD-10-CM

## 2022-09-22 PROCEDURE — 99213 OFFICE O/P EST LOW 20 MIN: CPT | Performed by: PODIATRIST

## 2022-09-22 PROCEDURE — 1123F ACP DISCUSS/DSCN MKR DOCD: CPT | Performed by: PODIATRIST

## 2022-09-22 NOTE — PROGRESS NOTES
22     Daisha Moya    : 1945   Sex: female    Age: 69 y.o. Patient's PCP/Provider is:  Amarilis Chen MD    Subjective:  Patient is seen today for follow-up regarding continued evaluation regarding right ankle arthritis and associated synovitis. Patient stated current AFO device is hard and uncomfortable with her every day activities. Patient stated she has difficulty applying the AFO and fitting it comfortably in her shoes. Patient did want to discuss other potential treatment options available. No other additional abnormalities noted. Chief Complaint   Patient presents with    Ankle Pain     Right ankle        ROS:  Const: Positives and pertinent negatives as per HPI. Musculo: Denies symptoms other than stated above. Neuro: Denies symptoms other than stated above. Skin: Denies symptoms other than stated above.     Current Medications:    Current Outpatient Medications:     DENTA 5000 PLUS 1.1 % CREA, USE AS DIRECTED, Disp: , Rfl:     Cholecalciferol (VITAMIN D-3) 25 MCG (1000 UT) CAPS, Take 1 capsule , off all other days, Disp: 45 capsule, Rfl: 3    aspirin 81 MG chewable tablet, Take 1 tablet by mouth daily Indications: Paroxysmal Atrial Fibrillation, Disp: 90 tablet, Rfl: 3    benazepril (LOTENSIN) 40 MG tablet, TAKE 1 TABLET BY MOUTH EVERY DAY FOR HIGH BLOOD PRESSURE, Disp: 90 tablet, Rfl: 3    dilTIAZem (TIAZAC) 360 MG extended release capsule, Take 1 capsule by mouth daily, Disp: 90 capsule, Rfl: 3    fish oil-omega-3 fatty acids 1000 MG capsule, Take 2 capsules by mouth 2 times daily, Disp: 360 capsule, Rfl: 3    fluticasone (FLONASE) 50 MCG/ACT nasal spray, 2 sprays by Nasal route daily 2 sprays in each nostril daily, Disp: 18.2 each, Rfl: 3    hydroCHLOROthiazide (HYDRODIURIL) 25 MG tablet, TAKE 1 TABLET BY MOUTH EVERY DAY FOR HIGH BLOOD PRESSURE, Disp: 90 tablet, Rfl: 3    potassium chloride (KLOR-CON M10) 10 MEQ extended release tablet, TAKE 1 TABLET BY MOUTH EVERY DAY, Disp: 90 tablet, Rfl: 3    simvastatin (ZOCOR) 20 MG tablet, Take 1 tablet by mouth nightly Indications: High Amount of Fats in the Blood Take 1 tablet nightly, Disp: 90 tablet, Rfl: 3    SYNTHROID 112 MCG tablet, TAKE 1 TABLET BY MOUTH EVERY DAY ON AN EMPTY STOMACH, Disp: 90 tablet, Rfl: 3    Calcium Carbonate-Vit D-Min (CALTRATE PLUS PO), Take 1,200 mg by mouth 2 times daily, Disp: , Rfl:     Coenzyme Q10 (CO Q 10) 100 MG CAPS, Take 200 mg by mouth daily, Disp: , Rfl:     Allergies: Allergies   Allergen Reactions    Albuterol Swelling    Atrovent Inhaler Swelling    Niacin And Related Swelling    Codeine Rash     Codeine And Related       Vitals:    09/22/22 1303   Weight: 157 lb (71.2 kg)   Height: 5' 2\" (1.575 m)       Exam:  Neurovascular status unchanged. Tenderness noted into the right ankle region with active range of motion and muscle testing performed. Edematous changes stable right lower extremity. Mild antalgic gait noted upon evaluation. Diagnostic Studies:     No results found. Procedures:    None    Plan Per Assessment  Osmin Cunningham was seen today for ankle pain. Diagnoses and all orders for this visit:    Traumatic arthritis of right ankle    Pain in right ankle and joints of right foot    Synovitis of right ankle    Difficulty walking      Evaluation and management  We did discuss potentially proceeding with another ankle support to give her added support and stability into the ankle region but also allowing for moderate range of motion. Patient is to continue with her good supportive shoe gear with every day ambulatory activities. Patient will be followed up at a later date for continued evaluation and management. Seen By:    Allie Catalan DPM    Electronically signed by Allie Catalan DPM on 9/22/2022 at 1:14 PM    This note was created using voice recognition software. The note was reviewed however may contain grammatical errors.

## 2022-09-22 NOTE — PROGRESS NOTES
Patient is in today for 2 month right ankle pain. Patient says the AFO is very uncomfortable and it is causing new aches and pains.  Pcp is Keyonna Archer MD  Last ov 8/12/22

## 2022-11-23 ENCOUNTER — HOSPITAL ENCOUNTER (OUTPATIENT)
Dept: GENERAL RADIOLOGY | Age: 77
Discharge: HOME | End: 2022-11-25
Payer: MEDICARE

## 2022-11-23 DIAGNOSIS — Z12.31 ENCOUNTER FOR SCREENING MAMMOGRAM FOR MALIGNANT NEOPLASM OF BREAST: ICD-10-CM

## 2022-11-23 PROCEDURE — 77067 SCR MAMMO BI INCL CAD: CPT

## 2022-12-19 ENCOUNTER — OFFICE VISIT (OUTPATIENT)
Dept: FAMILY MEDICINE CLINIC | Age: 77
End: 2022-12-19
Payer: MEDICARE

## 2022-12-19 VITALS
RESPIRATION RATE: 16 BRPM | SYSTOLIC BLOOD PRESSURE: 136 MMHG | OXYGEN SATURATION: 96 % | HEIGHT: 62 IN | HEART RATE: 73 BPM | TEMPERATURE: 98.1 F | BODY MASS INDEX: 29.44 KG/M2 | DIASTOLIC BLOOD PRESSURE: 86 MMHG | WEIGHT: 160 LBS

## 2022-12-19 DIAGNOSIS — I10 ESSENTIAL HYPERTENSION: Primary | ICD-10-CM

## 2022-12-19 DIAGNOSIS — I48.0 PAROXYSMAL ATRIAL FIBRILLATION (HCC): ICD-10-CM

## 2022-12-19 DIAGNOSIS — E78.2 MIXED HYPERLIPIDEMIA: ICD-10-CM

## 2022-12-19 DIAGNOSIS — E55.9 VITAMIN D INSUFFICIENCY: ICD-10-CM

## 2022-12-19 DIAGNOSIS — Z76.0 ENCOUNTER FOR MEDICATION REFILL: ICD-10-CM

## 2022-12-19 DIAGNOSIS — J31.0 RHINOSINUSITIS: ICD-10-CM

## 2022-12-19 DIAGNOSIS — J32.9 RHINOSINUSITIS: ICD-10-CM

## 2022-12-19 DIAGNOSIS — E03.9 ACQUIRED HYPOTHYROIDISM: ICD-10-CM

## 2022-12-19 PROCEDURE — 99214 OFFICE O/P EST MOD 30 MIN: CPT | Performed by: FAMILY MEDICINE

## 2022-12-19 PROCEDURE — 3074F SYST BP LT 130 MM HG: CPT | Performed by: FAMILY MEDICINE

## 2022-12-19 PROCEDURE — 1123F ACP DISCUSS/DSCN MKR DOCD: CPT | Performed by: FAMILY MEDICINE

## 2022-12-19 PROCEDURE — 3078F DIAST BP <80 MM HG: CPT | Performed by: FAMILY MEDICINE

## 2022-12-19 RX ORDER — POTASSIUM CHLORIDE 750 MG/1
TABLET, EXTENDED RELEASE ORAL
Qty: 90 TABLET | Refills: 3 | Status: SHIPPED | OUTPATIENT
Start: 2022-12-19 | End: 2023-12-19

## 2022-12-19 RX ORDER — FLUTICASONE PROPIONATE 50 MCG
2 SPRAY, SUSPENSION (ML) NASAL DAILY
Qty: 18.2 EACH | Refills: 3 | Status: SHIPPED | OUTPATIENT
Start: 2022-12-19 | End: 2023-12-19

## 2022-12-19 RX ORDER — ASPIRIN 81 MG/1
81 TABLET, CHEWABLE ORAL DAILY
Qty: 90 TABLET | Refills: 3 | COMMUNITY
Start: 2022-12-19 | End: 2023-12-19

## 2022-12-19 RX ORDER — DILTIAZEM HYDROCHLORIDE 360 MG/1
360 CAPSULE, EXTENDED RELEASE ORAL DAILY
Qty: 90 CAPSULE | Refills: 3 | Status: SHIPPED | OUTPATIENT
Start: 2022-12-19 | End: 2023-12-19

## 2022-12-19 RX ORDER — OMEGA-3/DHA/EPA/FISH OIL 300-1000MG
2 CAPSULE ORAL 2 TIMES DAILY
Qty: 360 CAPSULE | Refills: 3 | COMMUNITY
Start: 2022-12-19 | End: 2023-12-19

## 2022-12-19 RX ORDER — LEVOTHYROXINE SODIUM 112 MCG
TABLET ORAL
Qty: 90 TABLET | Refills: 3 | Status: SHIPPED | OUTPATIENT
Start: 2022-12-19 | End: 2023-12-19

## 2022-12-19 RX ORDER — BENAZEPRIL HYDROCHLORIDE 40 MG/1
TABLET, FILM COATED ORAL
Qty: 90 TABLET | Refills: 3 | Status: SHIPPED | OUTPATIENT
Start: 2022-12-19 | End: 2023-12-19

## 2022-12-19 RX ORDER — SIMVASTATIN 20 MG
20 TABLET ORAL NIGHTLY
Qty: 90 TABLET | Refills: 3 | Status: SHIPPED | OUTPATIENT
Start: 2022-12-19 | End: 2023-12-19

## 2022-12-19 RX ORDER — CHOLECALCIFEROL (VITAMIN D3) 25 MCG
CAPSULE ORAL
Qty: 45 CAPSULE | Refills: 3 | COMMUNITY
Start: 2022-12-19 | End: 2023-12-19

## 2022-12-19 RX ORDER — HYDROCHLOROTHIAZIDE 25 MG/1
TABLET ORAL
Qty: 90 TABLET | Refills: 3 | Status: SHIPPED | OUTPATIENT
Start: 2022-12-19 | End: 2023-12-19

## 2022-12-19 ASSESSMENT — ENCOUNTER SYMPTOMS
ABDOMINAL PAIN: 0
DIARRHEA: 0
VOMITING: 0
SHORTNESS OF BREATH: 0
BACK PAIN: 0
SPUTUM PRODUCTION: 0
ORTHOPNEA: 0
SINUS PAIN: 0
BLURRED VISION: 0
BLOOD IN STOOL: 0
WHEEZING: 0
DOUBLE VISION: 0
NAUSEA: 0
CONSTIPATION: 0
SORE THROAT: 0
HEARTBURN: 0
COUGH: 0

## 2022-12-19 NOTE — PROGRESS NOTES
Anton Wasserman is a 69 y.o. female who presents today for     Chief Complaint   Patient presents with    Follow-up    Medication Refill     In need of all medication refills. Hypertension:   Anton Wasserman is here for follow-up of elevated blood pressure. BP today: 136/86. She is not exercising and is adherent to a low-salt diet. Blood pressure is well controlled at home. Cardiac symptoms: none. Patient denies: chest pain, chest pressure/discomfort, claudication, dyspnea, exertional chest pressure/discomfort, fatigue, irregular heart beat, lower extremity edema, near-syncope, orthopnea, palpitations, paroxysmal nocturnal dyspnea and syncope. Cardiovascular risk factors: advanced age (older than 54 for men, 72 for women), dyslipidemia, hypertension, obesity (BMI >= 30 kg/m2) and sedentary lifestyle. Use of agents associated with hypertension: thyroid hormones. History of target organ damage: atrial fibrillation. Labs reviewed; all within desired limits    Labs done at outside facility; abstracted on 9/1/2022. Following assessment, 9/1/2022:      9/1/2022  1:44 PM EDT Back to Top      Please notify Blanche Strauss that her labs were reviewed and were essentially unremarkable. Specifically rheumatoid factor, lupus testing, and allergy testing were all negative. Hyperlipidemia:  Patient is here to follow up regarding  hyperlipidemia. This is  generally controlled. Treatment includes Simvastatin 20 mg/day. Patient is not compliant with lifestyle modifications. Patient is not a smoker. Most recent labs reviewed with patient today and are not remarkable. Comorbid conditions include HTN. Labs done at outside facility; abstracted on 9/1/2022. Following assessment, 9/1/2022:      9/1/2022  1:44 PM EDT Back to Top      Please notify Blanche Strauss that her labs were reviewed and were essentially unremarkable. Specifically rheumatoid factor, lupus testing, and allergy testing were all negative. Constipation:  Long standing. Managed with stool softener, fiber capsules and fiber gummies, adequate hydration. However, symptoms are worse now that diltiazem dosage was increased. Health Maintenance:  Kevin Jin is [facility]    [facility]:  Patient's past medical, surgical, social and/or family history reviewed, updated in chart, and are non-contributory (unless otherwise stated). Medications and allergies also reviewed and updated in chart. Review of Systems  Review of Systems   Constitutional:  Negative for chills, fever, malaise/fatigue and weight loss. HENT:  Negative for congestion, ear pain, sinus pain, sore throat and tinnitus. Persistent congestion of posterior nasal passages and sinuses. Reports control of symptoms with regular use of Flonase NS   Eyes:  Negative for blurred vision and double vision. Respiratory:  Negative for cough, sputum production, shortness of breath and wheezing. Cardiovascular:  Negative for chest pain, palpitations, orthopnea and leg swelling. Gastrointestinal:  Negative for abdominal pain, blood in stool, constipation, diarrhea, heartburn, nausea and vomiting. Genitourinary:  Negative for dysuria, frequency, hematuria and urgency. Musculoskeletal:  Positive for joint pain. Negative for back pain, myalgias and neck pain. Left shoulder pain     Skin:  Positive for rash. Negative for itching. Rash to mid-left upper back   Neurological:  Negative for dizziness, tingling, tremors, focal weakness, weakness and headaches. Endo/Heme/Allergies:  Does not bruise/bleed easily. Psychiatric/Behavioral:  Negative for depression, memory loss and substance abuse. The patient is not nervous/anxious and does not have insomnia.       Physical Exam:    VS:    /86   Pulse 73   Temp 98.1 °F (36.7 °C) (Infrared)   Resp 16   Ht 5' 2\" (1.575 m)   Wt 160 lb (72.6 kg)   SpO2 96%   BMI 29.26 kg/m²   LAST WEIGHT:  Wt Readings from Last 3 Encounters: 12/19/22 160 lb (72.6 kg)   09/22/22 157 lb (71.2 kg)   08/12/22 160 lb (72.6 kg)     BMI Readings from Last 3 Encounters:   12/19/22 29.26 kg/m²   09/22/22 28.72 kg/m²   08/12/22 29.26 kg/m²       Physical Exam  Constitutional:       General: She is not in acute distress. Appearance: She is well-developed. She is not diaphoretic. HENT:      Head: Normocephalic and atraumatic. Right Ear: External ear normal.      Left Ear: External ear normal.      Mouth/Throat:      Pharynx: No oropharyngeal exudate. Eyes:      General: No scleral icterus. Right eye: No discharge. Conjunctiva/sclera: Conjunctivae normal.      Pupils: Pupils are equal, round, and reactive to light. Neck:      Thyroid: No thyromegaly. Cardiovascular:      Rate and Rhythm: Normal rate and regular rhythm. Heart sounds: Normal heart sounds. No murmur heard. Pulmonary:      Effort: Pulmonary effort is normal. No respiratory distress. Breath sounds: No stridor. No wheezing or rales. Chest:      Chest wall: No tenderness. Abdominal:      General: Bowel sounds are normal. There is no distension. Palpations: Abdomen is soft. There is no mass. Tenderness: There is no abdominal tenderness. There is no guarding. Musculoskeletal:         General: No tenderness. Normal range of motion. Cervical back: Normal range of motion and neck supple. Lymphadenopathy:      Cervical: No cervical adenopathy. Skin:     General: Skin is warm and dry. Coloration: Skin is not pale. Findings: Rash present. No erythema. Neurological:      Mental Status: She is alert and oriented to person, place, and time. Psychiatric:         Behavior: Behavior normal.         Thought Content:  Thought content normal.       Labs:  Lab Results   Component Value Date/Time     05/22/2020 02:22 PM    K 3.4 05/22/2020 02:22 PM     05/22/2020 02:22 PM    CO2 29 05/22/2020 02:22 PM    BUN 15 05/22/2020 02:22 PM    CREATININE 0.91 05/22/2020 02:22 PM    PROT 7.3 05/24/2018 12:00 PM    LABALBU 4.4 05/22/2020 02:22 PM    LABALBU 4.8 03/23/2011 08:35 AM    CALCIUM 10.0 05/22/2020 02:22 PM    GFRAA >60 05/08/2019 06:00 AM    LABGLOM >60 05/22/2020 02:22 PM    LABGLOM >60 05/08/2019 06:00 AM    GLUCOSE 98 05/22/2020 02:22 PM    GLUCOSE 88 03/23/2011 08:35 AM    AST 30 05/22/2020 02:22 PM    ALT 30 05/22/2020 02:22 PM    ALKPHOS 60 05/22/2020 02:22 PM    BILITOT 0.9 05/22/2020 02:22 PM    TSH 0.950 05/22/2020 02:29 PM    CHOL 163 05/22/2020 02:26 PM    TRIG 104 05/22/2020 02:26 PM    HDL 47 05/22/2020 02:26 PM    LDLCALC 95 05/22/2020 02:26 PM        Lab Results   Component Value Date    CHOL 163 05/22/2020    CHOL 173 06/27/2019    CHOL 167 05/24/2018     Lab Results   Component Value Date    TRIG 104 05/22/2020    TRIG 162 06/27/2019    TRIG 137 05/24/2018     Lab Results   Component Value Date    HDL 47 05/22/2020    HDL 43 06/27/2019    HDL 40 05/24/2018     Lab Results   Component Value Date    LDLCALC 95 05/22/2020    LDLCALC 98 06/27/2019    LDLCALC 100 (H) 05/24/2018       No results found for: LABA1C  Lab Results   Component Value Date    [address] 95 05/22/2020    CREATININE 0.91 05/22/2020         Assessment / Plan:      Mary Kay Gutierrez was seen today for follow-up and medication refill. Diagnoses and all orders for this visit:      Paroxysmal atrial fibrillation [facility]): stable and well controlled  -     aspirin 81 MG chewable tablet; Take 1 tablet by mouth daily Indications: Paroxysmal Atrial Fibrillation  -     dilTIAZem (TIAZAC) 360 MG extended release capsule; Take 1 capsule by mouth daily    Essential hypertension: Stable and well controlled. Med refill  -     aspirin 81 MG chewable tablet;  Take 1 tablet by mouth daily Indications: Paroxysmal Atrial Fibrillation  -     benazepril (LOTENSIN) 40 MG tablet; TAKE 1 TABLET BY MOUTH EVERY DAY FOR HIGH BLOOD PRESSURE  -     dilTIAZem (TIAZAC) 360 MG extended release capsule; Take 1 capsule by mouth daily  -     hydroCHLOROthiazide (HYDRODIURIL) 25 MG tablet; TAKE 1 TABLET BY MOUTH EVERY DAY FOR HIGH BLOOD PRESSURE  -     potassium chloride (KLOR-CON M10) 10 MEQ extended release tablet; TAKE 1 TABLET BY MOUTH EVERY DAY  -     CBC Auto Differential; Future  -     Comprehensive Metabolic Panel; Future  -     Lipid Panel; Future  -     TSH without Reflex; Future    Mixed hyperlipidemia: Stable and well controlled. Med refill  -     simvastatin (ZOCOR) 20 MG tablet; Take 1 tablet by mouth nightly Indications: High Amount of Fats in the Blood Take 1 tablet nightly  -     Comprehensive Metabolic Panel; Future  -     Lipid Panel; Future  -     TSH without Reflex; Future    Acquired hypothyroidism: Stable and well controlled. Med refill  -     SYNTHROID 112 MCG tablet; TAKE 1 TABLET BY MOUTH EVERY DAY ON AN EMPTY STOMACH  -     T4; Future  -     TSH without Reflex; Future    Vitamin D insufficiency  -     Cholecalciferol (VITAMIN D-3) 25 MCG (1000 UT) CAPS; Take 1 capsule Monday Wednesday Friday, off all other days  -     Vitamin D 25 Hydroxy; Future    Rhinosinusitis  -     fluticasone (FLONASE) 50 MCG/ACT nasal spray; 2 sprays by Nasal route daily 2 sprays in each nostril daily    Encounter for medication refill  -     aspirin 81 MG chewable tablet; Take 1 tablet by mouth daily Indications: Paroxysmal Atrial Fibrillation  -     benazepril (LOTENSIN) 40 MG tablet; TAKE 1 TABLET BY MOUTH EVERY DAY FOR HIGH BLOOD PRESSURE  -     Cholecalciferol (VITAMIN D-3) 25 MCG (1000 UT) CAPS; Take 1 capsule Monday Wednesday Friday, off all other days  -     dilTIAZem (TIAZAC) 360 MG extended release capsule; Take 1 capsule by mouth daily  -     fish oil-omega-3 fatty acids 1000 MG capsule;  Take 2 capsules by mouth in the morning and 2 capsules in the evening.  -     fluticasone (FLONASE) 50 MCG/ACT nasal spray; 2 sprays by Nasal route daily 2 sprays in each nostril daily  -     hydroCHLOROthiazide (HYDRODIURIL) 25 MG tablet; TAKE 1 TABLET BY MOUTH EVERY DAY FOR HIGH BLOOD PRESSURE  -     potassium chloride (KLOR-CON M10) 10 MEQ extended release tablet; TAKE 1 TABLET BY MOUTH EVERY DAY  -     simvastatin (ZOCOR) 20 MG tablet; Take 1 tablet by mouth nightly Indications: High Amount of Fats in the Blood Take 1 tablet nightly  -     SYNTHROID 112 MCG tablet; TAKE 1 TABLET BY MOUTH EVERY DAY ON AN EMPTY STOMACH      Call or go to ED immediately if symptoms worsen or persist.    Follow Up:  Return 1) Keep scheduled appointment(s) (Medicare annual wellness visit 6/19/2023);, for 2) F/U 6 months (6/23, separate appt from [facility]) check up, med RF, Fasting lab work 1 week prior. , or sooner if necessary. Educational materials and/or home exercises printed for patient's review and were included in patient instructions on his/her After Visit Summary and given to patient at the end of visit. Counseled regarding above diagnosis, including possible risks and complications,  especially if left uncontrolled. Counseled regarding the possible side effects, risks, benefits and alternatives to treatment; patient and/or guardian verbalizes understanding, agrees, feels comfortable with and wishes to proceed with above treatment plan. Advised patient to call with any new medication issues, and read all Rx info from pharmacy to assure aware of all possible risks and side effects of medication before taking. Reviewed age and gender appropriate health screening exams and vaccinations. Advised patient regarding importance of keeping up with recommended health maintenance and to schedule as soon as possible if overdue, as this is important in assessing for undiagnosed pathology, especially cancer, as well as protecting against potentially harmful/life threatening disease. Patient and/or guardian verbalizes understanding and agrees with above counseling, assessment and plan. All questions answered.     Kei Yancey MD [place]

## 2023-01-18 DIAGNOSIS — J31.0 RHINOSINUSITIS: ICD-10-CM

## 2023-01-18 DIAGNOSIS — J32.9 RHINOSINUSITIS: ICD-10-CM

## 2023-01-18 DIAGNOSIS — Z76.0 ENCOUNTER FOR MEDICATION REFILL: ICD-10-CM

## 2023-01-18 RX ORDER — FLUTICASONE PROPIONATE 50 MCG
2 SPRAY, SUSPENSION (ML) NASAL DAILY
Refills: 3 | OUTPATIENT
Start: 2023-01-18 | End: 2024-01-18

## 2023-06-06 LAB
ALBUMIN SERPL-MCNC: NORMAL G/DL
ALP BLD-CCNC: NORMAL U/L
ALT SERPL-CCNC: NORMAL U/L
ANION GAP SERPL CALCULATED.3IONS-SCNC: NORMAL MMOL/L
AST SERPL-CCNC: NORMAL U/L
BASOPHILS ABSOLUTE: NORMAL
BASOPHILS RELATIVE PERCENT: NORMAL
BILIRUB SERPL-MCNC: NORMAL MG/DL
BUN BLDV-MCNC: NORMAL MG/DL
CALCIUM SERPL-MCNC: NORMAL MG/DL
CHLORIDE BLD-SCNC: NORMAL MMOL/L
CHOLESTEROL, TOTAL: NORMAL
CHOLESTEROL/HDL RATIO: NORMAL
CO2: NORMAL
CREAT SERPL-MCNC: NORMAL MG/DL
EGFR: NORMAL
EOSINOPHILS ABSOLUTE: NORMAL
EOSINOPHILS RELATIVE PERCENT: NORMAL
GLUCOSE BLD-MCNC: NORMAL MG/DL
HCT VFR BLD CALC: NORMAL %
HDLC SERPL-MCNC: NORMAL MG/DL
HEMOGLOBIN: NORMAL
LDL CHOLESTEROL CALCULATED: NORMAL
LYMPHOCYTES ABSOLUTE: NORMAL
LYMPHOCYTES RELATIVE PERCENT: NORMAL
MCH RBC QN AUTO: NORMAL PG
MCHC RBC AUTO-ENTMCNC: NORMAL G/DL
MCV RBC AUTO: NORMAL FL
MONOCYTES ABSOLUTE: NORMAL
MONOCYTES RELATIVE PERCENT: NORMAL
NEUTROPHILS ABSOLUTE: NORMAL
NEUTROPHILS RELATIVE PERCENT: NORMAL
NONHDLC SERPL-MCNC: NORMAL MG/DL
PDW BLD-RTO: NORMAL %
PLATELET # BLD: NORMAL 10*3/UL
PMV BLD AUTO: NORMAL FL
POTASSIUM SERPL-SCNC: NORMAL MMOL/L
RBC # BLD: NORMAL 10*6/UL
SODIUM BLD-SCNC: NORMAL MMOL/L
T4 TOTAL: NORMAL
TOTAL PROTEIN: NORMAL
TRIGL SERPL-MCNC: NORMAL MG/DL
TSH SERPL DL<=0.05 MIU/L-ACNC: NORMAL M[IU]/L
VITAMIN D 25-HYDROXY: NORMAL
VITAMIN D2, 25 HYDROXY: NORMAL
VITAMIN D3,25 HYDROXY: NORMAL
VLDLC SERPL CALC-MCNC: NORMAL MG/DL
WBC # BLD: NORMAL 10*3/UL

## 2023-06-07 DIAGNOSIS — I48.0 PAROXYSMAL ATRIAL FIBRILLATION (HCC): ICD-10-CM

## 2023-06-07 DIAGNOSIS — E78.2 MIXED HYPERLIPIDEMIA: ICD-10-CM

## 2023-06-07 DIAGNOSIS — E55.9 VITAMIN D INSUFFICIENCY: ICD-10-CM

## 2023-06-07 DIAGNOSIS — E03.9 ACQUIRED HYPOTHYROIDISM: ICD-10-CM

## 2023-06-07 DIAGNOSIS — I10 ESSENTIAL HYPERTENSION: ICD-10-CM

## 2023-06-19 ENCOUNTER — OFFICE VISIT (OUTPATIENT)
Dept: FAMILY MEDICINE CLINIC | Age: 78
End: 2023-06-19
Payer: MEDICARE

## 2023-06-19 VITALS
HEART RATE: 64 BPM | HEIGHT: 62 IN | WEIGHT: 158.9 LBS | RESPIRATION RATE: 18 BRPM | BODY MASS INDEX: 29.24 KG/M2 | DIASTOLIC BLOOD PRESSURE: 76 MMHG | SYSTOLIC BLOOD PRESSURE: 128 MMHG | OXYGEN SATURATION: 96 % | TEMPERATURE: 96.9 F

## 2023-06-19 DIAGNOSIS — Z76.0 ENCOUNTER FOR MEDICATION REFILL: ICD-10-CM

## 2023-06-19 DIAGNOSIS — Z72.3 INACTIVITY: ICD-10-CM

## 2023-06-19 DIAGNOSIS — Z00.00 MEDICARE ANNUAL WELLNESS VISIT, SUBSEQUENT: Primary | ICD-10-CM

## 2023-06-19 DIAGNOSIS — E55.9 VITAMIN D INSUFFICIENCY: ICD-10-CM

## 2023-06-19 DIAGNOSIS — R45.84 ANHEDONIA: ICD-10-CM

## 2023-06-19 PROCEDURE — 3074F SYST BP LT 130 MM HG: CPT | Performed by: FAMILY MEDICINE

## 2023-06-19 PROCEDURE — G0439 PPPS, SUBSEQ VISIT: HCPCS | Performed by: FAMILY MEDICINE

## 2023-06-19 PROCEDURE — 1123F ACP DISCUSS/DSCN MKR DOCD: CPT | Performed by: FAMILY MEDICINE

## 2023-06-19 PROCEDURE — 3078F DIAST BP <80 MM HG: CPT | Performed by: FAMILY MEDICINE

## 2023-06-19 RX ORDER — CHOLECALCIFEROL (VITAMIN D3) 25 MCG
CAPSULE ORAL
Qty: 45 CAPSULE | Refills: 3 | COMMUNITY
Start: 2023-06-19 | End: 2024-06-18

## 2023-06-19 RX ORDER — AMMONIUM LACTATE 12 G/100G
LOTION TOPICAL
COMMUNITY
Start: 2023-04-05

## 2023-06-19 SDOH — ECONOMIC STABILITY: INCOME INSECURITY: HOW HARD IS IT FOR YOU TO PAY FOR THE VERY BASICS LIKE FOOD, HOUSING, MEDICAL CARE, AND HEATING?: NOT HARD AT ALL

## 2023-06-19 SDOH — ECONOMIC STABILITY: FOOD INSECURITY: WITHIN THE PAST 12 MONTHS, THE FOOD YOU BOUGHT JUST DIDN'T LAST AND YOU DIDN'T HAVE MONEY TO GET MORE.: NEVER TRUE

## 2023-06-19 SDOH — ECONOMIC STABILITY: FOOD INSECURITY: WITHIN THE PAST 12 MONTHS, YOU WORRIED THAT YOUR FOOD WOULD RUN OUT BEFORE YOU GOT MONEY TO BUY MORE.: NEVER TRUE

## 2023-06-19 ASSESSMENT — PATIENT HEALTH QUESTIONNAIRE - PHQ9
10. IF YOU CHECKED OFF ANY PROBLEMS, HOW DIFFICULT HAVE THESE PROBLEMS MADE IT FOR YOU TO DO YOUR WORK, TAKE CARE OF THINGS AT HOME, OR GET ALONG WITH OTHER PEOPLE: 0
7. TROUBLE CONCENTRATING ON THINGS, SUCH AS READING THE NEWSPAPER OR WATCHING TELEVISION: 0
SUM OF ALL RESPONSES TO PHQ QUESTIONS 1-9: 6
SUM OF ALL RESPONSES TO PHQ9 QUESTIONS 1 & 2: 4
SUM OF ALL RESPONSES TO PHQ QUESTIONS 1-9: 6
9. THOUGHTS THAT YOU WOULD BE BETTER OFF DEAD, OR OF HURTING YOURSELF: 0
SUM OF ALL RESPONSES TO PHQ QUESTIONS 1-9: 6
SUM OF ALL RESPONSES TO PHQ QUESTIONS 1-9: 6
5. POOR APPETITE OR OVEREATING: 0
1. LITTLE INTEREST OR PLEASURE IN DOING THINGS: 2
3. TROUBLE FALLING OR STAYING ASLEEP: 0
4. FEELING TIRED OR HAVING LITTLE ENERGY: 2
6. FEELING BAD ABOUT YOURSELF - OR THAT YOU ARE A FAILURE OR HAVE LET YOURSELF OR YOUR FAMILY DOWN: 0
2. FEELING DOWN, DEPRESSED OR HOPELESS: 2
8. MOVING OR SPEAKING SO SLOWLY THAT OTHER PEOPLE COULD HAVE NOTICED. OR THE OPPOSITE, BEING SO FIGETY OR RESTLESS THAT YOU HAVE BEEN MOVING AROUND A LOT MORE THAN USUAL: 0

## 2023-06-19 ASSESSMENT — LIFESTYLE VARIABLES
HOW OFTEN DO YOU HAVE A DRINK CONTAINING ALCOHOL: 2-4 TIMES A MONTH
HOW MANY STANDARD DRINKS CONTAINING ALCOHOL DO YOU HAVE ON A TYPICAL DAY: 1 OR 2

## 2023-06-19 NOTE — PATIENT INSTRUCTIONS
floors can be particularly slippery. Stairs should always have handrails and be carpeted or fitted with a non-skid tread. Is your telephone easily reachable. Is the cord safely tucked away? Room by Room   According to the [employer]ging, bathrooms and demetra are the two most potentially hazardous rooms in your home. In the Kitchen    Be sure your stove is in proper working order and always make sure burners and the oven are off before you go out or go to sleep. Keep pots on the back burners, turn handles away from the front of the stove, and keep stove clean and free of grease build-up. Kitchen ventilation systems and range exhausts should be working properly. Keep flammable objects such as towels and pot holders away from the cooking area except when in use. Make sure kitchen curtains are tied back. Move cords and appliances away from the sink and hot surfaces. If extension cords are needed, install wiring guides so they do not hang over the sink, range, or working areas. Look for coffee pots, kettles and toaster ovens with automatic shut-offs. Keep a mop handy in the kitchen so you can wipe up spills instantly. You should also have a small fire extinguisher. Arrange your kitchen with frequently used items on lower shelves to avoid the need to stand on a stepstool to reach them. Make sure countertops are well-lit to avoid injuries while cutting and preparing food. In the Bathroom    Use a non-slip mat or decals in the tub and shower, since wet, soapy tile or porcelain surfaces are extremely slippery. Make sure bathroom rugs are non-skid or tape them firmly to the floor. Bathtubs should have at least one, preferably two, grab bars, firmly attached to structural supports in the wall. (Do not use built-in soap holders or glass shower doors as grab bars.)    Tub seats fitted with non-slip material on the legs allow you to wash sitting down.  For people with limited mobility,

## 2023-06-19 NOTE — PROGRESS NOTES
Medicare Annual Wellness Visit    Arnold Castillokelley is here for Medicare AWV and Discuss Labs      Assessment / Plan:      Brenda Lin was seen today for medicare awv and discuss labs. Diagnoses and all orders for this visit:    Medicare annual wellness visit, subsequent: Issues include low-grade anhedonia (patient declines medication management), and inactivity, which patient is vehement about discussing    Anhedonia    Inactivity    Encounter for medication refill  -     Cholecalciferol (VITAMIN D-3) 25 MCG (1000 UT) CAPS; 6/19/23: Take 1 capsule daily    Vitamin D insufficiency  -     Cholecalciferol (VITAMIN D-3) 25 MCG (1000 UT) CAPS; 6/19/23: Take 1 capsule daily      Recommendations for Preventive Services Due: see orders and patient instructions/AVS.  Recommended screening schedule for the next 5-10 years is provided to the patient in written form: see Patient Instructions/AVS.      Follow Up:  Return 1) Keep scheduled appointment for 6/26/23;, for 2) Medicare Annual Wellness Visit on 7/1/24. Subjective     The following acute and/or chronic problems were also addressed today:    No acute concerns or complaints noted today. Recent lab work reviewed; all lab values were within desired limits. Patient's complete Health Risk Assessment and screening values have been reviewed and are found in Flowsheets. The following problems were reviewed today and where indicated follow up appointments were made and/or referrals ordered.     Positive Risk Factor Screenings with Interventions:        Depression:  PHQ-2 Score: 4  PHQ-9 Total Score: 6    Interpretation:   1-4 = minimal  5-9 = mild  10-14 = moderate  15-19 = moderately severe  20-27 = severe    Interventions:  Patient declines any further evaluation or treatment            Weight and Activity:  Physical Activity: Inactive    Days of Exercise per Week: 0 days    Minutes of Exercise per Session: 0 min     On average, how many days per week do you

## 2023-06-26 ENCOUNTER — OFFICE VISIT (OUTPATIENT)
Dept: FAMILY MEDICINE CLINIC | Age: 78
End: 2023-06-26
Payer: MEDICARE

## 2023-06-26 VITALS
HEART RATE: 71 BPM | DIASTOLIC BLOOD PRESSURE: 80 MMHG | WEIGHT: 159 LBS | SYSTOLIC BLOOD PRESSURE: 124 MMHG | BODY MASS INDEX: 29.26 KG/M2 | RESPIRATION RATE: 16 BRPM | OXYGEN SATURATION: 96 % | HEIGHT: 62 IN | TEMPERATURE: 96.9 F

## 2023-06-26 DIAGNOSIS — J31.0 RHINOSINUSITIS: ICD-10-CM

## 2023-06-26 DIAGNOSIS — I10 ESSENTIAL HYPERTENSION: ICD-10-CM

## 2023-06-26 DIAGNOSIS — E55.9 VITAMIN D INSUFFICIENCY: ICD-10-CM

## 2023-06-26 DIAGNOSIS — E78.2 MIXED HYPERLIPIDEMIA: ICD-10-CM

## 2023-06-26 DIAGNOSIS — Z12.31 ENCOUNTER FOR SCREENING MAMMOGRAM FOR MALIGNANT NEOPLASM OF BREAST: ICD-10-CM

## 2023-06-26 DIAGNOSIS — Z76.0 ENCOUNTER FOR MEDICATION REFILL: Primary | ICD-10-CM

## 2023-06-26 DIAGNOSIS — J32.9 RHINOSINUSITIS: ICD-10-CM

## 2023-06-26 DIAGNOSIS — I48.0 PAROXYSMAL ATRIAL FIBRILLATION (HCC): ICD-10-CM

## 2023-06-26 DIAGNOSIS — E03.9 ACQUIRED HYPOTHYROIDISM: ICD-10-CM

## 2023-06-26 PROCEDURE — 1123F ACP DISCUSS/DSCN MKR DOCD: CPT | Performed by: FAMILY MEDICINE

## 2023-06-26 PROCEDURE — 3074F SYST BP LT 130 MM HG: CPT | Performed by: FAMILY MEDICINE

## 2023-06-26 PROCEDURE — 99214 OFFICE O/P EST MOD 30 MIN: CPT | Performed by: FAMILY MEDICINE

## 2023-06-26 PROCEDURE — 3079F DIAST BP 80-89 MM HG: CPT | Performed by: FAMILY MEDICINE

## 2023-06-26 RX ORDER — AMMONIUM LACTATE 12 G/100G
LOTION TOPICAL
Qty: 1 EACH | Refills: 11
Start: 2023-06-26 | End: 2024-06-26

## 2023-06-26 RX ORDER — SIMVASTATIN 20 MG
20 TABLET ORAL NIGHTLY
Qty: 90 TABLET | Refills: 3 | Status: SHIPPED | OUTPATIENT
Start: 2023-06-26 | End: 2024-06-26

## 2023-06-26 RX ORDER — FLUTICASONE PROPIONATE 50 MCG
2 SPRAY, SUSPENSION (ML) NASAL DAILY
Status: CANCELLED | OUTPATIENT
Start: 2023-06-26 | End: 2024-06-26

## 2023-06-26 RX ORDER — DILTIAZEM HYDROCHLORIDE 360 MG/1
360 CAPSULE, EXTENDED RELEASE ORAL DAILY
Qty: 90 CAPSULE | Refills: 3 | Status: SHIPPED | OUTPATIENT
Start: 2023-06-26 | End: 2024-06-26

## 2023-06-26 RX ORDER — POTASSIUM CHLORIDE 750 MG/1
TABLET, EXTENDED RELEASE ORAL
Qty: 90 TABLET | Refills: 3 | Status: SHIPPED | OUTPATIENT
Start: 2023-06-26 | End: 2024-06-26

## 2023-06-26 RX ORDER — LEVOTHYROXINE SODIUM 112 MCG
TABLET ORAL
Qty: 90 TABLET | Refills: 3 | Status: SHIPPED | OUTPATIENT
Start: 2023-06-26 | End: 2024-06-26

## 2023-06-26 RX ORDER — ASPIRIN 81 MG/1
81 TABLET, CHEWABLE ORAL DAILY
Qty: 90 TABLET | Refills: 3 | COMMUNITY
Start: 2023-06-26 | End: 2024-06-26

## 2023-06-26 RX ORDER — OMEGA-3/DHA/EPA/FISH OIL 300-1000MG
1 CAPSULE ORAL 2 TIMES DAILY
Qty: 180 CAPSULE | Refills: 3 | COMMUNITY
Start: 2023-06-26 | End: 2024-06-26

## 2023-06-26 RX ORDER — BENAZEPRIL HYDROCHLORIDE 40 MG/1
TABLET, FILM COATED ORAL
Qty: 90 TABLET | Refills: 3 | Status: SHIPPED | OUTPATIENT
Start: 2023-06-26 | End: 2024-06-26

## 2023-06-26 RX ORDER — SODIUM FLUORIDE 1.1 G/100G
GEL, DENTIFRICE ORAL
Qty: 51 G | Refills: 11
Start: 2023-06-26 | End: 2024-06-26

## 2023-06-26 RX ORDER — CHOLECALCIFEROL (VITAMIN D3) 25 MCG
CAPSULE ORAL
Qty: 90 CAPSULE | Refills: 3 | COMMUNITY
Start: 2023-06-26 | End: 2024-06-26

## 2023-06-26 RX ORDER — HYDROCHLOROTHIAZIDE 25 MG/1
TABLET ORAL
Qty: 90 TABLET | Refills: 3 | Status: SHIPPED | OUTPATIENT
Start: 2023-06-26 | End: 2024-06-26

## 2023-06-26 ASSESSMENT — ENCOUNTER SYMPTOMS
ORTHOPNEA: 0
SHORTNESS OF BREATH: 0
BLURRED VISION: 0
BLOOD IN STOOL: 0
HEARTBURN: 0
WHEEZING: 0
NAUSEA: 0
COUGH: 0
SORE THROAT: 0
BACK PAIN: 0
SPUTUM PRODUCTION: 0
SINUS PAIN: 0
ABDOMINAL PAIN: 0
DIARRHEA: 0
VOMITING: 0
CONSTIPATION: 0
DOUBLE VISION: 0

## 2023-11-21 ENCOUNTER — TELEPHONE (OUTPATIENT)
Dept: FAMILY MEDICINE CLINIC | Age: 78
End: 2023-11-21

## 2023-11-21 NOTE — TELEPHONE ENCOUNTER
LMOM for patient to contact office for clarification of pharmacy that patient is using, still [facility] or [id number] [facility]?

## 2023-11-22 DIAGNOSIS — E03.9 ACQUIRED HYPOTHYROIDISM: ICD-10-CM

## 2023-11-22 DIAGNOSIS — E78.2 MIXED HYPERLIPIDEMIA: ICD-10-CM

## 2023-11-22 DIAGNOSIS — Z76.0 ENCOUNTER FOR MEDICATION REFILL: ICD-10-CM

## 2023-11-22 DIAGNOSIS — I10 ESSENTIAL HYPERTENSION: ICD-10-CM

## 2023-11-22 DIAGNOSIS — I48.0 PAROXYSMAL ATRIAL FIBRILLATION (HCC): ICD-10-CM

## 2023-11-22 RX ORDER — HYDROCHLOROTHIAZIDE 25 MG/1
TABLET ORAL
Qty: 90 TABLET | Refills: 3 | Status: SHIPPED
Start: 2023-11-22 | End: 2023-11-26 | Stop reason: SDUPTHER

## 2023-11-22 RX ORDER — BENAZEPRIL HYDROCHLORIDE 40 MG/1
TABLET, FILM COATED ORAL
Qty: 90 TABLET | Refills: 3 | Status: SHIPPED | OUTPATIENT
Start: 2023-11-22 | End: 2024-11-22

## 2023-11-22 RX ORDER — DILTIAZEM HYDROCHLORIDE 360 MG/1
360 CAPSULE, EXTENDED RELEASE ORAL DAILY
Qty: 90 CAPSULE | Refills: 3 | Status: SHIPPED
Start: 2023-11-22 | End: 2023-11-26 | Stop reason: SDUPTHER

## 2023-11-22 RX ORDER — LEVOTHYROXINE SODIUM 112 MCG
TABLET ORAL
Qty: 90 TABLET | Refills: 3 | Status: SHIPPED
Start: 2023-11-22 | End: 2023-11-26 | Stop reason: SDUPTHER

## 2023-11-22 RX ORDER — SIMVASTATIN 20 MG
20 TABLET ORAL NIGHTLY
Qty: 90 TABLET | Refills: 3 | Status: SHIPPED
Start: 2023-11-22 | End: 2023-11-26 | Stop reason: SDUPTHER

## 2023-11-22 RX ORDER — POTASSIUM CHLORIDE 750 MG/1
TABLET, EXTENDED RELEASE ORAL
Qty: 90 TABLET | Refills: 3 | Status: SHIPPED
Start: 2023-11-22 | End: 2023-11-26 | Stop reason: SDUPTHER

## 2023-11-24 ENCOUNTER — HOSPITAL ENCOUNTER (OUTPATIENT)
Dept: GENERAL RADIOLOGY | Age: 78
Discharge: HOME | End: 2023-11-26

## 2023-11-24 DIAGNOSIS — Z12.31 ENCOUNTER FOR SCREENING MAMMOGRAM FOR MALIGNANT NEOPLASM OF BREAST: ICD-10-CM

## 2023-11-26 DIAGNOSIS — E78.2 MIXED HYPERLIPIDEMIA: ICD-10-CM

## 2023-11-26 DIAGNOSIS — Z76.0 ENCOUNTER FOR MEDICATION REFILL: ICD-10-CM

## 2023-11-26 DIAGNOSIS — E55.9 VITAMIN D INSUFFICIENCY: ICD-10-CM

## 2023-11-26 DIAGNOSIS — I10 ESSENTIAL HYPERTENSION: ICD-10-CM

## 2023-11-26 DIAGNOSIS — I48.0 PAROXYSMAL ATRIAL FIBRILLATION (HCC): ICD-10-CM

## 2023-11-26 DIAGNOSIS — E03.9 ACQUIRED HYPOTHYROIDISM: ICD-10-CM

## 2023-11-26 RX ORDER — DILTIAZEM HYDROCHLORIDE 360 MG/1
360 CAPSULE, EXTENDED RELEASE ORAL DAILY
Qty: 90 CAPSULE | Refills: 3 | Status: SHIPPED | OUTPATIENT
Start: 2023-11-26 | End: 2024-11-26

## 2023-11-26 RX ORDER — LEVOTHYROXINE SODIUM 112 MCG
TABLET ORAL
Qty: 90 TABLET | Refills: 3 | Status: SHIPPED | OUTPATIENT
Start: 2023-11-26 | End: 2024-11-26

## 2023-11-26 RX ORDER — SIMVASTATIN 20 MG
20 TABLET ORAL NIGHTLY
Qty: 90 TABLET | Refills: 3 | Status: SHIPPED | OUTPATIENT
Start: 2023-11-26 | End: 2024-11-26

## 2023-11-26 RX ORDER — ASPIRIN 81 MG/1
81 TABLET, CHEWABLE ORAL DAILY
Qty: 90 TABLET | Refills: 3 | COMMUNITY
Start: 2023-11-26 | End: 2024-11-26

## 2023-11-26 RX ORDER — OMEGA-3/DHA/EPA/FISH OIL 300-1000MG
1 CAPSULE ORAL 2 TIMES DAILY
Qty: 180 CAPSULE | Refills: 3 | COMMUNITY
Start: 2023-11-26 | End: 2024-11-26

## 2023-11-26 RX ORDER — CHOLECALCIFEROL (VITAMIN D3) 25 MCG
CAPSULE ORAL
Qty: 90 CAPSULE | Refills: 3 | COMMUNITY
Start: 2023-11-26 | End: 2024-11-26

## 2023-11-26 RX ORDER — HYDROCHLOROTHIAZIDE 25 MG/1
TABLET ORAL
Qty: 90 TABLET | Refills: 3 | Status: SHIPPED | OUTPATIENT
Start: 2023-11-26 | End: 2024-11-26

## 2023-11-26 RX ORDER — POTASSIUM CHLORIDE 750 MG/1
TABLET, EXTENDED RELEASE ORAL
Qty: 90 TABLET | Refills: 3 | Status: SHIPPED | OUTPATIENT
Start: 2023-11-26 | End: 2024-11-26

## 2023-11-29 DIAGNOSIS — N64.59 OTHER SIGNS AND SYMPTOMS IN BREAST: ICD-10-CM

## 2023-11-29 DIAGNOSIS — R22.31 AXILLARY MASS, RIGHT: Primary | ICD-10-CM

## 2023-12-11 ENCOUNTER — HOSPITAL ENCOUNTER (OUTPATIENT)
Dept: GENERAL RADIOLOGY | Age: 78
Discharge: HOME | End: 2023-12-13
Payer: MEDICARE

## 2023-12-11 VITALS — WEIGHT: 156 LBS | BODY MASS INDEX: 28.71 KG/M2 | HEIGHT: 62 IN

## 2023-12-11 DIAGNOSIS — R22.31 AXILLARY MASS, RIGHT: ICD-10-CM

## 2023-12-11 DIAGNOSIS — N64.59 OTHER SIGNS AND SYMPTOMS IN BREAST: ICD-10-CM

## 2023-12-11 PROCEDURE — 76882 US LMTD JT/FCL EVL NVASC XTR: CPT

## 2023-12-11 PROCEDURE — 77066 DX MAMMO INCL CAD BI: CPT

## 2024-03-06 ENCOUNTER — OFFICE VISIT (OUTPATIENT)
Dept: PODIATRY | Age: 79
End: 2024-03-06
Payer: MEDICARE

## 2024-03-06 VITALS — BODY MASS INDEX: 28.71 KG/M2 | WEIGHT: 156 LBS | HEIGHT: 62 IN

## 2024-03-06 DIAGNOSIS — I87.2 VENOUS INSUFFICIENCY (CHRONIC) (PERIPHERAL): ICD-10-CM

## 2024-03-06 DIAGNOSIS — B35.1 ONYCHOMYCOSIS: Primary | ICD-10-CM

## 2024-03-06 DIAGNOSIS — L84 CORNS AND CALLUS: ICD-10-CM

## 2024-03-06 DIAGNOSIS — R26.2 DIFFICULTY WALKING: ICD-10-CM

## 2024-03-06 DIAGNOSIS — I73.9 PVD (PERIPHERAL VASCULAR DISEASE) (HCC): ICD-10-CM

## 2024-03-06 PROCEDURE — 1123F ACP DISCUSS/DSCN MKR DOCD: CPT | Performed by: PODIATRIST

## 2024-03-06 PROCEDURE — 99213 OFFICE O/P EST LOW 20 MIN: CPT | Performed by: PODIATRIST

## 2024-03-06 NOTE — PROGRESS NOTES
Patient here for right small toe pain, callous. No known injury. Shanta Hogan MD last visit 6/26/2023.Electronically signed by Arabella Daniel LPN on 3/6/2024 at 1:24 PM        
grammatical errors.

## 2024-04-24 DIAGNOSIS — E03.9 ACQUIRED HYPOTHYROIDISM: ICD-10-CM

## 2024-04-24 DIAGNOSIS — I10 ESSENTIAL HYPERTENSION: ICD-10-CM

## 2024-04-24 DIAGNOSIS — E55.9 VITAMIN D INSUFFICIENCY: ICD-10-CM

## 2024-04-24 DIAGNOSIS — E78.2 MIXED HYPERLIPIDEMIA: ICD-10-CM

## 2024-05-08 ENCOUNTER — OFFICE VISIT (OUTPATIENT)
Dept: FAMILY MEDICINE CLINIC | Age: 79
End: 2024-05-08
Payer: MEDICARE

## 2024-05-08 VITALS
BODY MASS INDEX: 29.7 KG/M2 | TEMPERATURE: 97.9 F | OXYGEN SATURATION: 97 % | HEIGHT: 62 IN | WEIGHT: 161.4 LBS | RESPIRATION RATE: 16 BRPM | DIASTOLIC BLOOD PRESSURE: 76 MMHG | HEART RATE: 76 BPM | SYSTOLIC BLOOD PRESSURE: 176 MMHG

## 2024-05-08 DIAGNOSIS — I48.0 PAROXYSMAL ATRIAL FIBRILLATION (HCC): ICD-10-CM

## 2024-05-08 DIAGNOSIS — E78.2 MIXED HYPERLIPIDEMIA: ICD-10-CM

## 2024-05-08 DIAGNOSIS — E03.9 ACQUIRED HYPOTHYROIDISM: ICD-10-CM

## 2024-05-08 DIAGNOSIS — I10 ESSENTIAL HYPERTENSION: ICD-10-CM

## 2024-05-08 PROCEDURE — 1123F ACP DISCUSS/DSCN MKR DOCD: CPT | Performed by: FAMILY MEDICINE

## 2024-05-08 PROCEDURE — 99214 OFFICE O/P EST MOD 30 MIN: CPT | Performed by: FAMILY MEDICINE

## 2024-05-08 PROCEDURE — 3077F SYST BP >= 140 MM HG: CPT | Performed by: FAMILY MEDICINE

## 2024-05-08 PROCEDURE — 3078F DIAST BP <80 MM HG: CPT | Performed by: FAMILY MEDICINE

## 2024-05-08 RX ORDER — LEVOTHYROXINE SODIUM 112 MCG
TABLET ORAL
Qty: 90 TABLET | Refills: 3 | Status: SHIPPED | OUTPATIENT
Start: 2024-05-08 | End: 2025-05-09

## 2024-05-08 RX ORDER — POTASSIUM CHLORIDE 750 MG/1
TABLET, EXTENDED RELEASE ORAL
Qty: 90 TABLET | Refills: 3 | Status: SHIPPED | OUTPATIENT
Start: 2024-05-08 | End: 2025-05-09

## 2024-05-08 RX ORDER — BENAZEPRIL HYDROCHLORIDE 40 MG/1
TABLET ORAL
Qty: 90 TABLET | Refills: 3 | Status: SHIPPED | OUTPATIENT
Start: 2024-05-08 | End: 2025-05-09

## 2024-05-08 RX ORDER — ANTIOX #8/OM3/DHA/EPA/LUT/ZEAX 250-2.5 MG
2 CAPSULE ORAL DAILY
COMMUNITY

## 2024-05-08 RX ORDER — SPIRONOLACTONE 25 MG
1 TABLET ORAL DAILY
COMMUNITY

## 2024-05-08 RX ORDER — HYDROCHLOROTHIAZIDE 25 MG/1
TABLET ORAL
Qty: 90 TABLET | Refills: 3 | Status: SHIPPED | OUTPATIENT
Start: 2024-05-08 | End: 2025-05-09

## 2024-05-08 RX ORDER — DILTIAZEM HYDROCHLORIDE 360 MG/1
360 CAPSULE, EXTENDED RELEASE ORAL DAILY
Qty: 90 CAPSULE | Refills: 3 | Status: SHIPPED | OUTPATIENT
Start: 2024-05-08 | End: 2025-05-09

## 2024-05-08 RX ORDER — SIMVASTATIN 20 MG
20 TABLET ORAL NIGHTLY
Qty: 90 TABLET | Refills: 3 | Status: SHIPPED | OUTPATIENT
Start: 2024-05-08 | End: 2025-05-09

## 2024-05-08 ASSESSMENT — PATIENT HEALTH QUESTIONNAIRE - PHQ9
2. FEELING DOWN, DEPRESSED OR HOPELESS: NOT AT ALL
SUM OF ALL RESPONSES TO PHQ QUESTIONS 1-9: 0
1. LITTLE INTEREST OR PLEASURE IN DOING THINGS: NOT AT ALL
SUM OF ALL RESPONSES TO PHQ QUESTIONS 1-9: 0
SUM OF ALL RESPONSES TO PHQ9 QUESTIONS 1 & 2: 0

## 2024-05-08 NOTE — PROGRESS NOTES
Alcohol/week: 0.0 standard drinks of alcohol        Reviewof Systems:   Review of Systems Negative unless otherwise stated in HPI.    Physical Exam   Vitals: BP (!) 176/76   Pulse 76   Temp 97.9 °F (36.6 °C) (Infrared)   Resp 16   Ht 1.575 m (5' 2.01\")   Wt 73.2 kg (161 lb 6.4 oz)   SpO2 97%   BMI 29.51 kg/m²        Physical Exam  Constitutional:       Appearance: Normal appearance.   Eyes:      Extraocular Movements: Extraocular movements intact.      Conjunctiva/sclera: Conjunctivae normal.   Cardiovascular:      Rate and Rhythm: Normal rate and regular rhythm.      Heart sounds: No murmur heard.     No friction rub. No gallop.   Pulmonary:      Effort: Pulmonary effort is normal.      Breath sounds: Normal breath sounds.   Abdominal:      General: Abdomen is flat.      Palpations: Abdomen is soft.   Musculoskeletal:         General: No swelling or tenderness.      Cervical back: Normal range of motion and neck supple.      Right lower leg: No edema.      Left lower leg: No edema.   Skin:     General: Skin is warm and dry.   Neurological:      Mental Status: She is alert and oriented to person, place, and time. Mental status is at baseline.   Psychiatric:         Mood and Affect: Mood normal.         Thought Content: Thought content normal.         Medication List:    Current Outpatient Medications   Medication Sig Dispense Refill    Lutein 20 MG CAPS Take 1 capsule by mouth daily      Multiple Vitamins-Minerals (PRESERVISION AREDS 2) CAPS Take 2 capsules by mouth daily      Multiple Vitamins-Minerals (WOMENS 50+ MULTI VITAMIN PO) Take 1 tablet by mouth daily      benazepril (LOTENSIN) 40 MG tablet TAKE 1 TABLET BY MOUTH EVERY DAY FOR HIGH BLOOD PRESSURE 90 tablet 3    dilTIAZem (TIAZAC) 360 MG extended release capsule Take 1 capsule by mouth daily 90 capsule 3    hydroCHLOROthiazide (HYDRODIURIL) 25 MG tablet TAKE 1 TABLET BY MOUTH EVERY DAY FOR HIGH BLOOD PRESSURE 90 tablet 3    potassium chloride

## 2024-05-29 ENCOUNTER — NURSE ONLY (OUTPATIENT)
Dept: FAMILY MEDICINE CLINIC | Age: 79
End: 2024-05-29

## 2024-05-29 VITALS — OXYGEN SATURATION: 90 % | HEART RATE: 80 BPM | DIASTOLIC BLOOD PRESSURE: 79 MMHG | SYSTOLIC BLOOD PRESSURE: 152 MMHG

## 2024-05-29 DIAGNOSIS — I1A.0 RESISTANT HYPERTENSION: Primary | ICD-10-CM

## 2024-05-29 NOTE — PROGRESS NOTES
Pt is here for blood pressure check.  Pt denies chest pain, SOB and palpitations.     Pt states she is not taking Metoprolol.  Patient states she did not start the medication.      Patient brought in home blood pressure readings.  Scanned in to Media.

## 2024-06-05 ENCOUNTER — PROCEDURE VISIT (OUTPATIENT)
Dept: PODIATRY | Age: 79
End: 2024-06-05
Payer: MEDICARE

## 2024-06-05 VITALS — WEIGHT: 161 LBS | HEIGHT: 62 IN | BODY MASS INDEX: 29.63 KG/M2

## 2024-06-05 DIAGNOSIS — I1A.0 RESISTANT HYPERTENSION: ICD-10-CM

## 2024-06-05 DIAGNOSIS — I73.9 PVD (PERIPHERAL VASCULAR DISEASE) (HCC): ICD-10-CM

## 2024-06-05 DIAGNOSIS — I87.2 VENOUS INSUFFICIENCY (CHRONIC) (PERIPHERAL): ICD-10-CM

## 2024-06-05 DIAGNOSIS — B35.1 ONYCHOMYCOSIS: Primary | ICD-10-CM

## 2024-06-05 DIAGNOSIS — R26.2 DIFFICULTY WALKING: ICD-10-CM

## 2024-06-05 LAB
BILIRUBIN, URINE: NEGATIVE
COLOR: YELLOW
COMMENT: NORMAL
GLUCOSE URINE: NEGATIVE MG/DL
KETONES, URINE: NEGATIVE MG/DL
LEUKOCYTE ESTERASE, URINE: NEGATIVE
NITRITE, URINE: NEGATIVE
PH, URINE: 7.5 (ref 5–9)
PROTEIN UA: NEGATIVE MG/DL
PTH INTACT: 25.5 PG/ML (ref 15–65)
SPECIFIC GRAVITY UA: 1.01 (ref 1–1.03)
TURBIDITY: CLEAR
URINE HGB: NEGATIVE
UROBILINOGEN, URINE: 0.2 EU/DL (ref 0–1)

## 2024-06-05 PROCEDURE — 11721 DEBRIDE NAIL 6 OR MORE: CPT | Performed by: PODIATRIST

## 2024-06-05 PROCEDURE — 99999 PR OFFICE/OUTPT VISIT,PROCEDURE ONLY: CPT | Performed by: PODIATRIST

## 2024-06-10 LAB
METANEPH/PLASMA INTERP: NORMAL
METANEPHRINE: <0.1 NMOL/L (ref 0–0.49)
NORMETANEPHRINE PLASMA: 0.54 NMOL/L (ref 0–0.89)

## 2024-06-11 LAB — ALDOSTERONE: 9.6 NG/DL

## 2024-06-12 LAB — ALDOSTERONE/DIRECT RENIN CALCULATION: 0.8 RATIO (ref 0.1–3.7)

## 2024-09-20 ENCOUNTER — HOSPITAL ENCOUNTER (EMERGENCY)
Age: 79
Discharge: HOME | End: 2024-09-20
Payer: MEDICARE

## 2024-09-20 ENCOUNTER — APPOINTMENT (OUTPATIENT)
Dept: CT IMAGING | Age: 79
End: 2024-09-20
Payer: MEDICARE

## 2024-09-20 VITALS
OXYGEN SATURATION: 94 % | BODY MASS INDEX: 29.44 KG/M2 | RESPIRATION RATE: 18 BRPM | WEIGHT: 161 LBS | HEART RATE: 87 BPM | SYSTOLIC BLOOD PRESSURE: 192 MMHG | TEMPERATURE: 98.2 F | DIASTOLIC BLOOD PRESSURE: 88 MMHG

## 2024-09-20 DIAGNOSIS — K44.9 HIATAL HERNIA: ICD-10-CM

## 2024-09-20 DIAGNOSIS — K52.89 STERCORAL COLITIS: Primary | ICD-10-CM

## 2024-09-20 DIAGNOSIS — K59.00 CONSTIPATION, UNSPECIFIED CONSTIPATION TYPE: ICD-10-CM

## 2024-09-20 DIAGNOSIS — R91.1 LUNG NODULE SEEN ON IMAGING STUDY: ICD-10-CM

## 2024-09-20 LAB
ALBUMIN SERPL-MCNC: 4.7 G/DL (ref 3.5–5.2)
ALP SERPL-CCNC: 69 U/L (ref 35–104)
ALT SERPL-CCNC: 23 U/L (ref 0–32)
ANION GAP SERPL CALCULATED.3IONS-SCNC: 14 MMOL/L (ref 7–16)
AST SERPL-CCNC: 28 U/L (ref 0–31)
BASOPHILS # BLD: 0.03 K/UL (ref 0–0.2)
BASOPHILS NFR BLD: 0 % (ref 0–2)
BILIRUB SERPL-MCNC: 0.4 MG/DL (ref 0–1.2)
BUN SERPL-MCNC: 13 MG/DL (ref 6–23)
CALCIUM SERPL-MCNC: 10.4 MG/DL (ref 8.6–10.2)
CHLORIDE SERPL-SCNC: 101 MMOL/L (ref 98–107)
CO2 SERPL-SCNC: 27 MMOL/L (ref 22–29)
CREAT SERPL-MCNC: 0.8 MG/DL (ref 0.5–1)
EOSINOPHIL # BLD: 0.05 K/UL (ref 0.05–0.5)
EOSINOPHILS RELATIVE PERCENT: 0 % (ref 0–6)
ERYTHROCYTE [DISTWIDTH] IN BLOOD BY AUTOMATED COUNT: 12.4 % (ref 11.5–15)
GFR, ESTIMATED: 72 ML/MIN/1.73M2
GLUCOSE SERPL-MCNC: 112 MG/DL (ref 74–99)
HCT VFR BLD AUTO: 43.8 % (ref 34–48)
HGB BLD-MCNC: 14.4 G/DL (ref 11.5–15.5)
IMM GRANULOCYTES # BLD AUTO: 0.05 K/UL (ref 0–0.58)
IMM GRANULOCYTES NFR BLD: 0 % (ref 0–5)
LACTATE BLDV-SCNC: 1.6 MMOL/L (ref 0.5–2.2)
LIPASE SERPL-CCNC: 57 U/L (ref 13–60)
LYMPHOCYTES NFR BLD: 1.5 K/UL (ref 1.5–4)
LYMPHOCYTES RELATIVE PERCENT: 13 % (ref 20–42)
MCH RBC QN AUTO: 31.4 PG (ref 26–35)
MCHC RBC AUTO-ENTMCNC: 32.9 G/DL (ref 32–34.5)
MCV RBC AUTO: 95.4 FL (ref 80–99.9)
MONOCYTES NFR BLD: 0.62 K/UL (ref 0.1–0.95)
MONOCYTES NFR BLD: 5 % (ref 2–12)
NEUTROPHILS NFR BLD: 81 % (ref 43–80)
NEUTS SEG NFR BLD: 9.33 K/UL (ref 1.8–7.3)
PLATELET # BLD AUTO: 185 K/UL (ref 130–450)
PMV BLD AUTO: 10.9 FL (ref 7–12)
POTASSIUM SERPL-SCNC: 4 MMOL/L (ref 3.5–5)
PROT SERPL-MCNC: 7.4 G/DL (ref 6.4–8.3)
RBC # BLD AUTO: 4.59 M/UL (ref 3.5–5.5)
SODIUM SERPL-SCNC: 142 MMOL/L (ref 132–146)
WBC OTHER # BLD: 11.6 K/UL (ref 4.5–11.5)

## 2024-09-20 PROCEDURE — 80053 COMPREHEN METABOLIC PANEL: CPT

## 2024-09-20 PROCEDURE — 83690 ASSAY OF LIPASE: CPT

## 2024-09-20 PROCEDURE — 99284 EMERGENCY DEPT VISIT MOD MDM: CPT

## 2024-09-20 PROCEDURE — 83605 ASSAY OF LACTIC ACID: CPT

## 2024-09-20 PROCEDURE — 85025 COMPLETE CBC W/AUTO DIFF WBC: CPT

## 2024-09-20 PROCEDURE — 74176 CT ABD & PELVIS W/O CONTRAST: CPT

## 2024-09-20 PROCEDURE — 99284 EMERGENCY DEPT VISIT MOD MDM: CPT | Performed by: SURGERY

## 2024-09-20 PROCEDURE — 6370000000 HC RX 637 (ALT 250 FOR IP): Performed by: NURSE PRACTITIONER

## 2024-09-20 RX ORDER — CEFDINIR 300 MG/1
300 CAPSULE ORAL ONCE
Status: COMPLETED | OUTPATIENT
Start: 2024-09-20 | End: 2024-09-20

## 2024-09-20 RX ORDER — AMOXICILLIN 250 MG
1 CAPSULE ORAL DAILY PRN
Qty: 10 TABLET | Refills: 0 | Status: SHIPPED | OUTPATIENT
Start: 2024-09-20 | End: 2024-09-20

## 2024-09-20 RX ORDER — POLYETHYLENE GLYCOL 3350 17 G/17G
17 POWDER, FOR SOLUTION ORAL DAILY
Qty: 510 G | Refills: 0 | Status: SHIPPED | OUTPATIENT
Start: 2024-09-20 | End: 2024-10-20

## 2024-09-20 RX ORDER — CEFDINIR 300 MG/1
300 CAPSULE ORAL 2 TIMES DAILY
Qty: 20 CAPSULE | Refills: 0 | Status: SHIPPED | OUTPATIENT
Start: 2024-09-20 | End: 2024-09-30

## 2024-09-20 RX ORDER — METRONIDAZOLE 500 MG/1
500 TABLET ORAL 3 TIMES DAILY
Qty: 30 TABLET | Refills: 0 | Status: SHIPPED | OUTPATIENT
Start: 2024-09-20 | End: 2024-09-30

## 2024-09-20 RX ORDER — AMOXICILLIN 250 MG
2 CAPSULE ORAL DAILY PRN
Qty: 10 TABLET | Refills: 0 | Status: SHIPPED | OUTPATIENT
Start: 2024-09-20 | End: 2024-09-30

## 2024-09-20 RX ORDER — METRONIDAZOLE 500 MG/1
500 TABLET ORAL ONCE
Status: COMPLETED | OUTPATIENT
Start: 2024-09-20 | End: 2024-09-20

## 2024-09-20 RX ORDER — POLYETHYLENE GLYCOL 3350 17 G/17G
17 POWDER, FOR SOLUTION ORAL 2 TIMES DAILY
Qty: 510 G | Refills: 0 | Status: SHIPPED | OUTPATIENT
Start: 2024-09-20 | End: 2024-10-20

## 2024-09-20 RX ADMIN — POLYETHYLENE GLYCOL-3350 AND ELECTROLYTES 4000 ML: 236; 6.74; 5.86; 2.97; 22.74 POWDER, FOR SOLUTION ORAL at 22:34

## 2024-09-20 RX ADMIN — METRONIDAZOLE 500 MG: 500 TABLET ORAL at 22:33

## 2024-09-20 RX ADMIN — CEFDINIR 300 MG: 300 CAPSULE ORAL at 22:33

## 2024-09-20 ASSESSMENT — ENCOUNTER SYMPTOMS
CHEST TIGHTNESS: 0
VOMITING: 0
ABDOMINAL PAIN: 0
CONSTIPATION: 1
NAUSEA: 0
ABDOMINAL DISTENTION: 1
COUGH: 0
DIARRHEA: 0
SHORTNESS OF BREATH: 0

## 2024-09-20 ASSESSMENT — PAIN - FUNCTIONAL ASSESSMENT: PAIN_FUNCTIONAL_ASSESSMENT: NONE - DENIES PAIN

## 2024-09-22 PROBLEM — K52.89 STERCORAL COLITIS: Status: ACTIVE | Noted: 2024-09-22

## 2024-10-13 ENCOUNTER — HOSPITAL ENCOUNTER (EMERGENCY)
Age: 79
Discharge: HOME | End: 2024-10-13
Payer: MEDICARE

## 2024-10-13 VITALS
SYSTOLIC BLOOD PRESSURE: 163 MMHG | RESPIRATION RATE: 18 BRPM | DIASTOLIC BLOOD PRESSURE: 67 MMHG | HEART RATE: 77 BPM | BODY MASS INDEX: 29.63 KG/M2 | WEIGHT: 161 LBS | OXYGEN SATURATION: 97 % | TEMPERATURE: 98.2 F | HEIGHT: 62 IN

## 2024-10-13 DIAGNOSIS — R21 RASH AND OTHER NONSPECIFIC SKIN ERUPTION: Primary | ICD-10-CM

## 2024-10-13 PROCEDURE — 2500000003 HC RX 250 WO HCPCS: Performed by: NURSE PRACTITIONER

## 2024-10-13 PROCEDURE — 6360000002 HC RX W HCPCS: Performed by: NURSE PRACTITIONER

## 2024-10-13 PROCEDURE — 96375 TX/PRO/DX INJ NEW DRUG ADDON: CPT

## 2024-10-13 PROCEDURE — 99284 EMERGENCY DEPT VISIT MOD MDM: CPT

## 2024-10-13 PROCEDURE — 2580000003 HC RX 258: Performed by: NURSE PRACTITIONER

## 2024-10-13 PROCEDURE — 96374 THER/PROPH/DIAG INJ IV PUSH: CPT

## 2024-10-13 RX ORDER — DEXAMETHASONE SODIUM PHOSPHATE 10 MG/ML
8 INJECTION INTRAMUSCULAR; INTRAVENOUS ONCE
Status: COMPLETED | OUTPATIENT
Start: 2024-10-13 | End: 2024-10-13

## 2024-10-13 RX ORDER — HYDROCORTISONE 2.5 %
CREAM (GRAM) TOPICAL
Qty: 1 EACH | Refills: 0 | Status: SHIPPED | OUTPATIENT
Start: 2024-10-13

## 2024-10-13 RX ORDER — DIPHENHYDRAMINE HYDROCHLORIDE 50 MG/ML
12.5 INJECTION INTRAMUSCULAR; INTRAVENOUS ONCE
Status: COMPLETED | OUTPATIENT
Start: 2024-10-13 | End: 2024-10-13

## 2024-10-13 RX ORDER — METHYLPREDNISOLONE 4 MG
TABLET, DOSE PACK ORAL
Qty: 1 KIT | Refills: 0 | Status: SHIPPED | OUTPATIENT
Start: 2024-10-13 | End: 2024-10-19

## 2024-10-13 RX ADMIN — FAMOTIDINE 20 MG: 10 INJECTION, SOLUTION INTRAVENOUS at 13:51

## 2024-10-13 RX ADMIN — DEXAMETHASONE SODIUM PHOSPHATE 8 MG: 10 INJECTION INTRAMUSCULAR; INTRAVENOUS at 13:51

## 2024-10-13 RX ADMIN — DIPHENHYDRAMINE HYDROCHLORIDE 12.5 MG: 50 INJECTION INTRAMUSCULAR; INTRAVENOUS at 13:51

## 2024-10-13 ASSESSMENT — LIFESTYLE VARIABLES
HOW MANY STANDARD DRINKS CONTAINING ALCOHOL DO YOU HAVE ON A TYPICAL DAY: PATIENT DOES NOT DRINK
HOW OFTEN DO YOU HAVE A DRINK CONTAINING ALCOHOL: NEVER

## 2024-10-13 NOTE — DISCHARGE INSTRUCTIONS
USE THE CREAM AND TAKE THE STEROIDS UNTIL COMPLETED.  CONTACT YOUR PRIMARY DOCTOR TOMORROW FOR RE-EVALUATION.  CONTACT DERMATOLOGY FOR EVALUATION.  RETURN FOR WORSENING SYMPTOMS.

## 2024-10-13 NOTE — ED PROVIDER NOTES
Myringotomy Tympanostomy Tube Placement (Right, 5/8/2019).    Social History:  reports that she has never smoked. She has never used smokeless tobacco. She reports that she does not currently use alcohol. She reports that she does not use drugs.    Family History: family history includes Cancer in her mother; Diabetes (age of onset: 66) in her sister; Heart Disease in her father.     Allergies: Albuterol, Atrovent inhaler, Niacin and related, and Codeine    Physical Exam   Oxygen Saturation Interpretation: Normal.        ED Triage Vitals [10/13/24 1251]   BP Systolic BP Percentile Diastolic BP Percentile Temp Temp Source Pulse Respirations SpO2   (!) 163/67 -- -- 98.2 °F (36.8 °C) Oral 77 18 97 %      Height Weight - Scale         1.575 m (5' 2\") 73 kg (161 lb)             Constitutional:  Alert, development consistent with age.  No acute distress.  HEENT:  NC/NT.  Airway patent.  Eyes: EOMI, no discharge.   Mouth:  Mucous membranes moist without lesions, tongue and gums normal.  Throat:  Pharynx without injection, exudate, or tonsillar hypertrophy.  Airway patient.  Neck:  Supple.  No meningeal signs.  Full ROM.  Respiratory:  Clear to auscultation and breath sounds equal.  Respirations regular, nonlabored, no tachypnea.  CV:  Regular rate and rhythm.  GI:  Abdomen Soft, nontender, +BS.  Integument:  Skin turgor: Normal.    -  Blanchable rash to cheeks, forehead, bilateral upper extremities and right shoulder/upper chest.  Neurological:  Orientation age-appropriate unless noted elseware.  Motor functions intact. GCS 15, fluent speech, ambulatory, PACK x4.     Lab / Imaging Results   (All laboratory and radiology results have been personally reviewed by myself)  Labs:  No results found for this visit on 10/13/24.    Imaging:  All Radiology results interpreted by Radiologist unless otherwise noted.  No orders to display       ED Course / Medical Decision Making     Medications   dexAMETHasone (DECADRON) injection 8 mg

## 2024-10-24 ENCOUNTER — OFFICE VISIT (OUTPATIENT)
Dept: SURGERY | Age: 79
End: 2024-10-24
Payer: MEDICARE

## 2024-10-24 ENCOUNTER — TELEPHONE (OUTPATIENT)
Dept: SURGERY | Age: 79
End: 2024-10-24

## 2024-10-24 ENCOUNTER — PREP FOR PROCEDURE (OUTPATIENT)
Dept: SURGERY | Age: 79
End: 2024-10-24

## 2024-10-24 VITALS
DIASTOLIC BLOOD PRESSURE: 67 MMHG | HEIGHT: 62 IN | WEIGHT: 160.6 LBS | OXYGEN SATURATION: 93 % | HEART RATE: 97 BPM | SYSTOLIC BLOOD PRESSURE: 164 MMHG | TEMPERATURE: 97.8 F | BODY MASS INDEX: 29.55 KG/M2

## 2024-10-24 DIAGNOSIS — K59.01 SLOW TRANSIT CONSTIPATION: ICD-10-CM

## 2024-10-24 DIAGNOSIS — K59.01 SLOW TRANSIT CONSTIPATION: Primary | ICD-10-CM

## 2024-10-24 PROCEDURE — 99204 OFFICE O/P NEW MOD 45 MIN: CPT | Performed by: SURGERY

## 2024-10-24 PROCEDURE — 1123F ACP DISCUSS/DSCN MKR DOCD: CPT | Performed by: SURGERY

## 2024-10-24 PROCEDURE — 3078F DIAST BP <80 MM HG: CPT | Performed by: SURGERY

## 2024-10-24 PROCEDURE — 99202 OFFICE O/P NEW SF 15 MIN: CPT | Performed by: SURGERY

## 2024-10-24 PROCEDURE — 3077F SYST BP >= 140 MM HG: CPT | Performed by: SURGERY

## 2024-10-24 RX ORDER — POLYETHYLENE GLYCOL 3350, SODIUM SULFATE ANHYDROUS, SODIUM BICARBONATE, SODIUM CHLORIDE, POTASSIUM CHLORIDE 236; 22.74; 6.74; 5.86; 2.97 G/4L; G/4L; G/4L; G/4L; G/4L
1 POWDER, FOR SOLUTION ORAL ONCE
Qty: 4000 ML | Refills: 0 | Status: SHIPPED | OUTPATIENT
Start: 2024-10-24 | End: 2024-10-24

## 2024-10-24 NOTE — PROGRESS NOTES
[facility]  General Surgery Attending Office Progress Note    Chief Complaint: constipation       Subjective:   Jaida Virk complains of constipation.  Pt was seen in the ED on 9/20/24 for constipation. Since she was manually disimpacted, she has been on a bowel regiment. She moves her bowels daily now. Her last colonoscopy was in 2013 with me.   She is currently taking benefiber 1 x per day, fiber gummys, stool softener--colace 2 tabs per day,senna-freddie    Pt takes asa 81 mg daily       Tolerating diet  No fevers. No chills. No nausea/ vomiting    --I have reviewed and confirmed the past medical history, surgical history, social history, allergies in the chart.  --Medications: I have reviewed the medication list in the chart.    --Review of systems-pertinent positives noted in HPI, otherwise negative      Objective:     Vitals:    10/24/24 1226   BP: (!) 164/67   Pulse: 97   Temp: 97.8 °F (36.6 °C)   SpO2: 93%     Physical Exam  Awake alert x3, GCS 15  No apparent distress  Lungs clear bilaterally, no use of accessory muscles  Heart S1S2, RRR  Abdomen soft nontender nondistended       Assessment:      Diagnosis Orders   1. Slow transit constipation            Plan:    - I have reviewed the prior progress note from the ED provider visit on 9/20  .       -I have reviewed the following  labs:  CBC with Differential:    Lab Results   Component Value Date/Time    WBC 11.6 09/20/2024 04:51 PM    RBC 4.59 09/20/2024 04:51 PM    HGB 14.4 09/20/2024 04:51 PM    HCT 43.8 09/20/2024 04:51 PM     09/20/2024 04:51 PM    MCV 95.4 09/20/2024 04:51 PM    MCH 31.4 09/20/2024 04:51 PM    MCHC 32.9 09/20/2024 04:51 PM    RDW 12.4 09/20/2024 04:51 PM    LYMPHOPCT 13 09/20/2024 04:51 PM    MONOPCT 5 09/20/2024 04:51 PM    EOSPCT 0 09/20/2024 04:51 PM    BASOPCT 0 09/20/2024 04:51 PM    MONOSABS 0.62 09/20/2024 04:51 PM    LYMPHSABS 1.50 09/20/2024 04:51 PM    EOSABS 0.05 09/20/2024 04:51 PM    BASOSABS 0.03

## 2024-10-24 NOTE — PATIENT INSTRUCTIONS
temporary, however, and will improve once bowel movements begin.If you have nausea or vomiting, notify your physician.  Continue clear liquids      Day of Endoscopy:   At 5 hours before the scheduled time for your endoscopy, drink the remaining prep again taking 8 oz glass every 10 minutes until you have drank the remaining four glasses.  You may take an 8 oz glass of water after your last glass of prep.  Nothing to drink for 3 hours prior to your endoscopy.  If you are take any blood pressure medications or heart medications in the morning, you should taking them with a sip of water prior to leaving for the hospital for your endoscopy.      Instructions for Clear liquid diet  Definition  A clear liquid diet consists of clear liquids, such as water, broth and plain gelatin, that are easily digested and leave no undigested residue in your intestinal tract. Your doctor may prescribe a clear liquid diet before certain medical procedures or if you have certain digestive problems. Because a clear liquid diet can't provide you with adequate calories and nutrients, it shouldn't be continued for more than a few days.     Purpose  A clear liquid diet is often used before tests, procedures or surgeries that require no food in your stomach or intestines, such as before colonoscopy. It may also be recommended as a short-term diet if you have certain digestive problems, such as nausea, vomiting or diarrhea, or after certain types of surgery.     Diet details  A clear liquid diet helps maintain adequate hydration, provides some important electrolytes, such as sodium and potassium, and gives some energy at a time when a full diet isn't possible or recommended.   The following foods are allowed in a clear liquid diet:   Plain water   Fruit juices without pulp, such as apple juice, grape juice or cranberry juice   Strained lemonade or fruit punch   Clear, fat-free broth (bouillon or consomme)   Clear sodas   Plain gelatin   Honey

## 2024-10-24 NOTE — TELEPHONE ENCOUNTER
Prior Authorization Form:      DEMOGRAPHICS:                     Patient Name:  Nakul Virk  Patient :  1945            Insurance:  Payor: [facility] MEDICARE / Plan: ANTHEM MEDIBLUE ESSENTIAL/PLUS / Product Type: *No Product type* /   Insurance ID Number:    Payer/Plan Subscr  Sex Relation Sub. Ins. ID Effective Group Num   1. BCBS MEDICARE* NAKUL VIRK 1945 Female Self [id number] 1/1/15 [facility]                                    [id number]   2. [place]ICAID [place] -* NAKUL VIRK 1945 Female Self [id number]                                    [address]         DIAGNOSIS & PROCEDURE:                       Procedure/Operation: Colonoscopy           CPT Code: 43134    Diagnosis:  Slow transit constipation    ICD10 Code: K59.0    Location:  [facility]    Surgeon:  Gonzalo Marion MD      SCHEDULING INFORMATION:                          Date: 2024    Time: 1145              Anesthesia:  MAC/TIVA                                                       Status:  Outpatient        Electronically signed by Divina Pacheco MA on 10/24/2024 at 1:08 PM

## 2024-12-02 ENCOUNTER — HOSPITAL ENCOUNTER (OUTPATIENT)
Dept: GENERAL RADIOLOGY | Age: 79
Discharge: HOME | End: 2024-12-04
Payer: MEDICARE

## 2024-12-02 VITALS — HEIGHT: 62 IN | BODY MASS INDEX: 29.44 KG/M2 | WEIGHT: 160 LBS

## 2024-12-02 DIAGNOSIS — Z12.31 ENCOUNTER FOR SCREENING MAMMOGRAM FOR MALIGNANT NEOPLASM OF BREAST: ICD-10-CM

## 2024-12-02 DIAGNOSIS — Z78.0 POSTMENOPAUSAL: ICD-10-CM

## 2024-12-02 PROCEDURE — 77063 BREAST TOMOSYNTHESIS BI: CPT

## 2024-12-02 PROCEDURE — 77080 DXA BONE DENSITY AXIAL: CPT

## 2024-12-04 ENCOUNTER — TELEPHONE (OUTPATIENT)
Dept: SURGERY | Age: 79
End: 2024-12-04

## 2024-12-09 NOTE — TELEPHONE ENCOUNTER
MA contacted surgery scheduling and cx the colonoscopy with DR. Marion. Pt to call back to schedule when ready.  Electronically signed by Divina Pacheco MA on 12/9/24 at 9:14 AM EST

## 2024-12-12 NOTE — PROGRESS NOTES
[facility]      Department of Family Medicine  Phone: [phone]   Fax: [phone]    12/12/24    Jaida Virk is a 79 y.o. female with PMHX of paroxysmal a fib, hypothyroidism, HTN, HLD presenting to the outpatient clinic for:  Chief Complaint   Patient presents with    New Patient     No concerns        New Patient   (Prev Lm, Maryjo, Rumbaugh, and Schor)    HPI:    Social:  Lives with: self   Works at: [employer], retired at 59 yo  Children/grandchildren: none  Pets: none  Hobbies: reading (mystery, crime)  Alcohol: denies   Drugs: denies   Tobacco: denies   Vape: denies     Medical History:  Hypothyroidism: Synthroid 112 mcg   HLD: Zocor 40 mg daily   HTN: HCTZ 25 mg daily, klor con 10 meq  A fib: Diltiazem 360 mg daily, ASA 81 mg daily; \"On this basis, in the absence of additional arrhythmia related symptoms or suggestive evidence of recurrent atrial fibrillation beyond this single episode reportedly occurring in excess of a decade earlier, and in spite of her [id number] risk score of 3, I have not recommended oral anticoagulation.\"  Vit D: 1000 UT daily     Health Maintenance:  Vaccines: agreeable for flu   Colonoscopy: Dr. Marion; last 2013  Labs: due   Pap: aged out   DEXA/Mammogram: DEXA 12/2024 normal; mammo 12/2024 normal  Lung CT: n/a       Review of Systems:  Review of Systems   Constitutional:  Negative for chills, fatigue and fever.   HENT:  Negative for congestion, sinus pain and sore throat.    Eyes:  Negative for pain and visual disturbance.   Respiratory:  Negative for cough, shortness of breath and wheezing.    Cardiovascular:  Negative for chest pain, palpitations and leg swelling.   Gastrointestinal:  Negative for abdominal pain, nausea and vomiting.   Genitourinary:  Negative for frequency, hematuria and urgency.   Musculoskeletal:  Negative for arthralgias and joint swelling.   Skin:  Negative for rash and wound.

## 2024-12-13 ENCOUNTER — OFFICE VISIT (OUTPATIENT)
Dept: FAMILY MEDICINE CLINIC | Age: 79
End: 2024-12-13
Payer: MEDICARE

## 2024-12-13 VITALS
TEMPERATURE: 97.9 F | HEIGHT: 62 IN | SYSTOLIC BLOOD PRESSURE: 136 MMHG | DIASTOLIC BLOOD PRESSURE: 82 MMHG | BODY MASS INDEX: 30.36 KG/M2 | HEART RATE: 87 BPM | OXYGEN SATURATION: 93 % | WEIGHT: 165 LBS

## 2024-12-13 DIAGNOSIS — E03.9 ACQUIRED HYPOTHYROIDISM: ICD-10-CM

## 2024-12-13 DIAGNOSIS — E78.00 PURE HYPERCHOLESTEROLEMIA: ICD-10-CM

## 2024-12-13 DIAGNOSIS — I10 ESSENTIAL HYPERTENSION: ICD-10-CM

## 2024-12-13 DIAGNOSIS — E55.9 VITAMIN D INSUFFICIENCY: ICD-10-CM

## 2024-12-13 DIAGNOSIS — I48.0 PAROXYSMAL ATRIAL FIBRILLATION (HCC): Primary | ICD-10-CM

## 2024-12-13 DIAGNOSIS — Z23 INFLUENZA VACCINE NEEDED: ICD-10-CM

## 2024-12-13 DIAGNOSIS — R73.9 HYPERGLYCEMIA: ICD-10-CM

## 2024-12-13 LAB
ALBUMIN: 4.9 G/DL (ref 3.5–5.2)
ALP BLD-CCNC: 66 U/L (ref 35–104)
ALT SERPL-CCNC: 29 U/L (ref 0–32)
ANION GAP SERPL CALCULATED.3IONS-SCNC: 11 MMOL/L (ref 7–16)
AST SERPL-CCNC: 32 U/L (ref 0–31)
BASOPHILS ABSOLUTE: 0.02 K/UL (ref 0–0.2)
BASOPHILS RELATIVE PERCENT: 0 % (ref 0–2)
BILIRUB SERPL-MCNC: 0.3 MG/DL (ref 0–1.2)
BUN BLDV-MCNC: 12 MG/DL (ref 6–23)
CALCIUM SERPL-MCNC: 11.1 MG/DL (ref 8.6–10.2)
CHLORIDE BLD-SCNC: 102 MMOL/L (ref 98–107)
CHOLESTEROL, TOTAL: 208 MG/DL
CO2: 31 MMOL/L (ref 22–29)
CREAT SERPL-MCNC: 0.8 MG/DL (ref 0.5–1)
EOSINOPHILS ABSOLUTE: 0.13 K/UL (ref 0.05–0.5)
EOSINOPHILS RELATIVE PERCENT: 2 % (ref 0–6)
GFR, ESTIMATED: 78 ML/MIN/1.73M2
GLUCOSE BLD-MCNC: 96 MG/DL (ref 74–99)
HBA1C MFR BLD: 5.4 % (ref 4–5.6)
HCT VFR BLD CALC: 44.1 % (ref 34–48)
HDLC SERPL-MCNC: 50 MG/DL
HEMOGLOBIN: 14.4 G/DL (ref 11.5–15.5)
IMMATURE GRANULOCYTES %: 0 % (ref 0–5)
IMMATURE GRANULOCYTES ABSOLUTE: <0.03 K/UL (ref 0–0.58)
LDL CHOLESTEROL: 97 MG/DL
LYMPHOCYTES ABSOLUTE: 2.05 K/UL (ref 1.5–4)
LYMPHOCYTES RELATIVE PERCENT: 29 % (ref 20–42)
MCH RBC QN AUTO: 31.5 PG (ref 26–35)
MCHC RBC AUTO-ENTMCNC: 32.7 G/DL (ref 32–34.5)
MCV RBC AUTO: 96.5 FL (ref 80–99.9)
MONOCYTES ABSOLUTE: 0.49 K/UL (ref 0.1–0.95)
MONOCYTES RELATIVE PERCENT: 7 % (ref 2–12)
NEUTROPHILS ABSOLUTE: 4.27 K/UL (ref 1.8–7.3)
NEUTROPHILS RELATIVE PERCENT: 61 % (ref 43–80)
PDW BLD-RTO: 12.6 % (ref 11.5–15)
PLATELET # BLD: 204 K/UL (ref 130–450)
PMV BLD AUTO: 11.7 FL (ref 7–12)
POTASSIUM SERPL-SCNC: 4 MMOL/L (ref 3.5–5)
RBC # BLD: 4.57 M/UL (ref 3.5–5.5)
SODIUM BLD-SCNC: 144 MMOL/L (ref 132–146)
TOTAL PROTEIN: 7.7 G/DL (ref 6.4–8.3)
TRIGL SERPL-MCNC: 303 MG/DL
TSH SERPL DL<=0.05 MIU/L-ACNC: 2.32 UIU/ML (ref 0.27–4.2)
VITAMIN D 25-HYDROXY: 65.5 NG/ML (ref 30–100)
VLDLC SERPL CALC-MCNC: 61 MG/DL
WBC # BLD: 7 K/UL (ref 4.5–11.5)

## 2024-12-13 PROCEDURE — 1159F MED LIST DOCD IN RCRD: CPT | Performed by: STUDENT IN AN ORGANIZED HEALTH CARE EDUCATION/TRAINING PROGRAM

## 2024-12-13 PROCEDURE — 99214 OFFICE O/P EST MOD 30 MIN: CPT | Performed by: STUDENT IN AN ORGANIZED HEALTH CARE EDUCATION/TRAINING PROGRAM

## 2024-12-13 PROCEDURE — 3079F DIAST BP 80-89 MM HG: CPT | Performed by: STUDENT IN AN ORGANIZED HEALTH CARE EDUCATION/TRAINING PROGRAM

## 2024-12-13 PROCEDURE — G0008 ADMIN INFLUENZA VIRUS VAC: HCPCS | Performed by: STUDENT IN AN ORGANIZED HEALTH CARE EDUCATION/TRAINING PROGRAM

## 2024-12-13 PROCEDURE — 90653 IIV ADJUVANT VACCINE IM: CPT | Performed by: STUDENT IN AN ORGANIZED HEALTH CARE EDUCATION/TRAINING PROGRAM

## 2024-12-13 PROCEDURE — 36415 COLL VENOUS BLD VENIPUNCTURE: CPT | Performed by: STUDENT IN AN ORGANIZED HEALTH CARE EDUCATION/TRAINING PROGRAM

## 2024-12-13 PROCEDURE — 3075F SYST BP GE 130 - 139MM HG: CPT | Performed by: STUDENT IN AN ORGANIZED HEALTH CARE EDUCATION/TRAINING PROGRAM

## 2024-12-13 PROCEDURE — 1123F ACP DISCUSS/DSCN MKR DOCD: CPT | Performed by: STUDENT IN AN ORGANIZED HEALTH CARE EDUCATION/TRAINING PROGRAM

## 2024-12-13 SDOH — ECONOMIC STABILITY: FOOD INSECURITY: WITHIN THE PAST 12 MONTHS, YOU WORRIED THAT YOUR FOOD WOULD RUN OUT BEFORE YOU GOT MONEY TO BUY MORE.: NEVER TRUE

## 2024-12-13 SDOH — ECONOMIC STABILITY: FOOD INSECURITY: WITHIN THE PAST 12 MONTHS, THE FOOD YOU BOUGHT JUST DIDN'T LAST AND YOU DIDN'T HAVE MONEY TO GET MORE.: NEVER TRUE

## 2024-12-13 SDOH — ECONOMIC STABILITY: INCOME INSECURITY: HOW HARD IS IT FOR YOU TO PAY FOR THE VERY BASICS LIKE FOOD, HOUSING, MEDICAL CARE, AND HEATING?: NOT HARD AT ALL

## 2024-12-14 RX ORDER — ATORVASTATIN CALCIUM 40 MG/1
40 TABLET, FILM COATED ORAL DAILY
Qty: 90 TABLET | Refills: 0 | Status: SHIPPED | OUTPATIENT
Start: 2024-12-14

## 2024-12-14 ASSESSMENT — ENCOUNTER SYMPTOMS
NAUSEA: 0
EYE PAIN: 0
VOMITING: 0
ABDOMINAL PAIN: 0
SINUS PAIN: 0
SORE THROAT: 0
COUGH: 0
SHORTNESS OF BREATH: 0
WHEEZING: 0

## 2024-12-14 NOTE — RESULT ENCOUNTER NOTE
Cholesterol somewhat elevated. I know last PCP did increase Zocor but I would actually prefer patient is on high intensity statin and will place order for because overall risk of heart attack or stroke in next 10 years is >30 %  The 10-year ASCVD risk score (Karine JEONG, et al., 2019) is: 30.9%    Values used to calculate the score:      Age: 79 years      Sex: Female      Is Non- : No      Diabetic: No      Tobacco smoker: No      Systolic Blood Pressure: 136 mmHg      Is BP treated: Yes      HDL Cholesterol: 50 mg/dL      Total Cholesterol: 208 mg/dL    Labs otherwise stable.

## 2025-03-11 SDOH — HEALTH STABILITY: PHYSICAL HEALTH: ON AVERAGE, HOW MANY MINUTES DO YOU ENGAGE IN EXERCISE AT THIS LEVEL?: 0 MIN

## 2025-03-11 SDOH — HEALTH STABILITY: PHYSICAL HEALTH: ON AVERAGE, HOW MANY DAYS PER WEEK DO YOU ENGAGE IN MODERATE TO STRENUOUS EXERCISE (LIKE A BRISK WALK)?: 0 DAYS

## 2025-03-11 ASSESSMENT — LIFESTYLE VARIABLES
HOW MANY STANDARD DRINKS CONTAINING ALCOHOL DO YOU HAVE ON A TYPICAL DAY: 0
HOW OFTEN DO YOU HAVE SIX OR MORE DRINKS ON ONE OCCASION: 1
HOW OFTEN DO YOU HAVE A DRINK CONTAINING ALCOHOL: 2

## 2025-03-14 ENCOUNTER — OFFICE VISIT (OUTPATIENT)
Dept: FAMILY MEDICINE CLINIC | Age: 80
End: 2025-03-14
Payer: MEDICARE

## 2025-03-14 VITALS
DIASTOLIC BLOOD PRESSURE: 90 MMHG | BODY MASS INDEX: 30.29 KG/M2 | HEART RATE: 86 BPM | OXYGEN SATURATION: 97 % | TEMPERATURE: 98.6 F | SYSTOLIC BLOOD PRESSURE: 180 MMHG | HEIGHT: 62 IN | WEIGHT: 164.6 LBS

## 2025-03-14 DIAGNOSIS — I48.0 PAROXYSMAL ATRIAL FIBRILLATION (HCC): ICD-10-CM

## 2025-03-14 DIAGNOSIS — E78.00 PURE HYPERCHOLESTEROLEMIA: ICD-10-CM

## 2025-03-14 DIAGNOSIS — Z00.00 MEDICARE ANNUAL WELLNESS VISIT, SUBSEQUENT: Primary | ICD-10-CM

## 2025-03-14 DIAGNOSIS — I10 ESSENTIAL HYPERTENSION: ICD-10-CM

## 2025-03-14 PROCEDURE — 1160F RVW MEDS BY RX/DR IN RCRD: CPT | Performed by: STUDENT IN AN ORGANIZED HEALTH CARE EDUCATION/TRAINING PROGRAM

## 2025-03-14 PROCEDURE — 1123F ACP DISCUSS/DSCN MKR DOCD: CPT | Performed by: STUDENT IN AN ORGANIZED HEALTH CARE EDUCATION/TRAINING PROGRAM

## 2025-03-14 PROCEDURE — G0439 PPPS, SUBSEQ VISIT: HCPCS | Performed by: STUDENT IN AN ORGANIZED HEALTH CARE EDUCATION/TRAINING PROGRAM

## 2025-03-14 PROCEDURE — 1159F MED LIST DOCD IN RCRD: CPT | Performed by: STUDENT IN AN ORGANIZED HEALTH CARE EDUCATION/TRAINING PROGRAM

## 2025-03-14 PROCEDURE — 3077F SYST BP >= 140 MM HG: CPT | Performed by: STUDENT IN AN ORGANIZED HEALTH CARE EDUCATION/TRAINING PROGRAM

## 2025-03-14 PROCEDURE — 3080F DIAST BP >= 90 MM HG: CPT | Performed by: STUDENT IN AN ORGANIZED HEALTH CARE EDUCATION/TRAINING PROGRAM

## 2025-03-14 RX ORDER — METOPROLOL TARTRATE 50 MG
50 TABLET ORAL 2 TIMES DAILY
Qty: 180 TABLET | Refills: 1 | Status: SHIPPED | OUTPATIENT
Start: 2025-03-14

## 2025-03-14 RX ORDER — ATORVASTATIN CALCIUM 40 MG/1
40 TABLET, FILM COATED ORAL DAILY
Qty: 90 TABLET | Refills: 3 | Status: SHIPPED | OUTPATIENT
Start: 2025-03-14

## 2025-03-14 SDOH — ECONOMIC STABILITY: FOOD INSECURITY: WITHIN THE PAST 12 MONTHS, THE FOOD YOU BOUGHT JUST DIDN'T LAST AND YOU DIDN'T HAVE MONEY TO GET MORE.: NEVER TRUE

## 2025-03-14 SDOH — ECONOMIC STABILITY: FOOD INSECURITY: WITHIN THE PAST 12 MONTHS, YOU WORRIED THAT YOUR FOOD WOULD RUN OUT BEFORE YOU GOT MONEY TO BUY MORE.: NEVER TRUE

## 2025-03-14 NOTE — PATIENT INSTRUCTIONS
can.  If you need to, use lactose-free milk or fortified plant-based milk products, such as soy milk.        Water   Drink water when you're thirsty.  Limit sugar-sweetened drinks, including soda, fruit drinks, and sports drinks.  Where can you learn more?  Go to https://www.[employer].net/patientEd and enter T756 to learn more about \"Eating Healthy Foods: Care Instructions.\"  Current as of: October 7, 2024  Content Version: 14.4  © 2024-2025 [employer].   Care instructions adapted under license by [employer]. If you have questions about a medical condition or this instruction, always ask your healthcare professional. [employer], disclaims any warranty or liability for your use of this information.         Starting a Weight-Loss Plan: Care Instructions  Overview    It can be a challenge to lose weight. But your doctor can help you make a weight-loss plan that meets your needs.  You don't have to make a lot of big changes at once. A better idea might be to focus on small changes and stick with them. When those changes become habit, you can add a few more changes.  Some people find it helpful to take an exercise or nutrition class. If you have questions, ask your doctor about seeing a registered dietitian or an exercise specialist. You might also think about joining a weight-loss support group.  If you're not ready to make changes right now, try to pick a date in the future. Then make an appointment with your doctor to talk about when and how you'll get started with a plan.  Follow-up care is a key part of your treatment and safety. Be sure to make and go to all appointments, and call your doctor if you are having problems. It's also a good idea to know your test results and keep a list of the medicines you take.  How can you care for yourself as you start a weight-loss plan?  Set realistic goals. Many people expect to lose much more weight than is likely. A weight loss of 5% to 10% of your

## 2025-03-14 NOTE — PROGRESS NOTES
Medicare Annual Wellness Visit    Jaida Virk is here for Medicare AWV    Assessment & Plan   Medicare annual wellness visit, subsequent  Pure hypercholesterolemia  -     atorvastatin (LIPITOR) 40 MG tablet; Take 1 tablet by mouth daily, Disp-90 tablet, R-3Normal  -Controlled   -Refills needed  Essential hypertension  -     metoprolol tartrate (LOPRESSOR) 50 MG tablet; Take 1 tablet by mouth 2 times daily, Disp-180 tablet, R-1Normal  -Uncontrolled  -Increase Lopressor to 50 mg twice daily  -Advised patient to take readings for the next 2 weeks and to call readings into office so we can make further adjustments if needed; may need to consider hydralazine  Paroxysmal atrial fibrillation (HCC)  -     metoprolol tartrate (LOPRESSOR) 50 MG tablet; Take 1 tablet by mouth 2 times daily, Disp-180 tablet, R-1Normal       Return in 2 months (on 5/14/2025) for HTN, Medicare Annual Wellness Visit in 1 year.     Subjective   The following acute and/or chronic problems were also addressed today:      HTN  Current Regimen: HCTZ 25 mg daily, Diltiazem 360 mg daily , Lopressor 25 mg BID, Lotensin 40 mg daily   Takes half of medications in the morning and half at night   Does not consistently check home readings  Has cardio appointment 4/10  Denies CP, SOB, LE edema, HA    Patient's complete Health Risk Assessment and screening values have been reviewed and are found in Flowsheets. The following problems were reviewed today and where indicated follow up appointments were made and/or referrals ordered.    Positive Risk Factor Screenings with Interventions:              Inactivity:  On average, how many days per week do you engage in moderate to strenuous exercise (like a brisk walk)?: (Patient-Rptd) 0 days (!) Abnormal  On average, how many minutes do you engage in exercise at this level?: (Patient-Rptd) 0 min  Interventions:  See AVS for additional education material    Poor Eating Habits/Diet:  Do you eat balanced/healthy meals

## 2025-04-09 ENCOUNTER — TELEPHONE (OUTPATIENT)
Dept: CARDIOLOGY CLINIC | Age: 80
End: 2025-04-09

## 2025-04-10 ENCOUNTER — OFFICE VISIT (OUTPATIENT)
Dept: CARDIOLOGY CLINIC | Age: 80
End: 2025-04-10

## 2025-04-10 VITALS
HEART RATE: 90 BPM | SYSTOLIC BLOOD PRESSURE: 186 MMHG | BODY MASS INDEX: 30.44 KG/M2 | RESPIRATION RATE: 18 BRPM | HEIGHT: 62 IN | DIASTOLIC BLOOD PRESSURE: 98 MMHG | WEIGHT: 165.4 LBS

## 2025-04-10 DIAGNOSIS — I10 ESSENTIAL HYPERTENSION: Primary | ICD-10-CM

## 2025-04-10 NOTE — PROGRESS NOTES
History    Not on file   Tobacco Use    Smoking status: Never    Smokeless tobacco: Never   Vaping Use    Vaping status: Never Used   Substance and Sexual Activity    Alcohol use: Not Currently     Alcohol/week: 0.0 standard drinks of alcohol    Drug use: Never    Sexual activity: Defer   Other Topics Concern    Not on file   Social History Narrative    Drinks 2 cups of coffee daily.      Social Drivers of Health     Financial Resource Strain: Low Risk  (2024)    Overall Financial Resource Strain (CARDIA)     Difficulty of Paying Living Expenses: Not hard at all   Food Insecurity: No Food Insecurity (3/14/2025)    Hunger Vital Sign     Worried About Running Out of Food in the Last Year: Never true     Ran Out of Food in the Last Year: Never true   Transportation Needs: No Transportation Needs (3/14/2025)    PRAPARE - Transportation     Lack of Transportation (Medical): No     Lack of Transportation (Non-Medical): No   Physical Activity: Inactive (3/11/2025)    Exercise Vital Sign     Days of Exercise per Week: 0 days     Minutes of Exercise per Session: 0 min   Stress: Not on file   Social Connections: Not on file   Intimate Partner Violence: Not on file   Housing Stability: Low Risk  (3/14/2025)    Housing Stability Vital Sign     Unable to Pay for Housing in the Last Year: No     Number of Times Moved in the Last Year: 0     Homeless in the Last Year: No       Family History   Problem Relation Age of Onset    Cancer Mother          at 60 because of lung cancer    Heart Disease Father          at 67 secondary to MI    Diabetes Sister 66       REVIEW OF SYSTEMS:     CONSTITUTIONAL:  negative for  fevers, chills, sweats, + fatigue  EYES:  negative for  double vision, blurred vision and blind spots  HEENT:  negative for  tinnitus, earaches, nasal congestion and epistaxis  RESPIRATORY:  negative for  dry cough, cough with sputum, dyspnea, wheezing and hemoptysis  CARDIOVASCULAR: as per

## 2025-05-01 DIAGNOSIS — I10 ESSENTIAL HYPERTENSION: ICD-10-CM

## 2025-05-01 RX ORDER — BENAZEPRIL HYDROCHLORIDE 40 MG/1
TABLET ORAL
Qty: 90 TABLET | Refills: 0 | Status: SHIPPED | OUTPATIENT
Start: 2025-05-01 | End: 2026-05-02

## 2025-05-01 RX ORDER — POTASSIUM CHLORIDE 750 MG/1
TABLET, EXTENDED RELEASE ORAL
Qty: 90 TABLET | Refills: 0 | Status: SHIPPED | OUTPATIENT
Start: 2025-05-01 | End: 2026-05-02

## 2025-05-01 RX ORDER — HYDROCHLOROTHIAZIDE 25 MG/1
TABLET ORAL
Qty: 90 TABLET | Refills: 0 | Status: SHIPPED | OUTPATIENT
Start: 2025-05-01 | End: 2026-05-02

## 2025-05-01 NOTE — TELEPHONE ENCOUNTER
Name of Medication(s) Requested:  Requested Prescriptions     Pending Prescriptions Disp Refills    hydroCHLOROthiazide (HYDRODIURIL) 25 MG tablet 90 tablet 3     Sig: TAKE 1 TABLET BY MOUTH EVERY DAY FOR HIGH BLOOD PRESSURE    benazepril (LOTENSIN) 40 MG tablet 90 tablet 3     Sig: TAKE 1 TABLET BY MOUTH EVERY DAY FOR HIGH BLOOD PRESSURE    potassium chloride (KLOR-CON M10) 10 MEQ extended release tablet 90 tablet 3     Sig: TAKE 1 TABLET BY MOUTH EVERY DAY       Medication is on current medication list Yes    Dosage and directions were verified? Yes    Quantity verified: 90 day supply     Pharmacy Verified?  Yes    Last Appointment:  3/14/2025    Future appts:  Future Appointments   Date Time Provider Department Center   5/19/2025  1:00 PM Juan Paulino MD [facility]        (If no appt send self scheduling link. .REFILLAPPT)  Scheduling request sent?     [] Yes  [] No    Does patient need updated?  [] Yes  [] No

## 2025-05-19 ENCOUNTER — OFFICE VISIT (OUTPATIENT)
Dept: FAMILY MEDICINE CLINIC | Age: 80
End: 2025-05-19
Payer: MEDICARE

## 2025-05-19 VITALS
SYSTOLIC BLOOD PRESSURE: 150 MMHG | BODY MASS INDEX: 30.07 KG/M2 | HEIGHT: 62 IN | DIASTOLIC BLOOD PRESSURE: 76 MMHG | HEART RATE: 91 BPM | RESPIRATION RATE: 17 BRPM | WEIGHT: 163.4 LBS | OXYGEN SATURATION: 96 % | TEMPERATURE: 97.3 F

## 2025-05-19 DIAGNOSIS — E03.9 ACQUIRED HYPOTHYROIDISM: ICD-10-CM

## 2025-05-19 DIAGNOSIS — I48.0 PAROXYSMAL ATRIAL FIBRILLATION (HCC): ICD-10-CM

## 2025-05-19 DIAGNOSIS — I10 ESSENTIAL HYPERTENSION: ICD-10-CM

## 2025-05-19 PROBLEM — L73.9 FOLLICULITIS: Status: RESOLVED | Noted: 2020-08-12 | Resolved: 2025-05-19

## 2025-05-19 PROBLEM — E66.9 MODERATE OBESITY: Status: RESOLVED | Noted: 2019-06-27 | Resolved: 2025-05-19

## 2025-05-19 PROBLEM — Z76.0 ENCOUNTER FOR MEDICATION REFILL: Status: RESOLVED | Noted: 2020-10-26 | Resolved: 2025-05-19

## 2025-05-19 PROBLEM — J32.9 RHINOSINUSITIS: Status: RESOLVED | Noted: 2020-10-26 | Resolved: 2025-05-19

## 2025-05-19 PROCEDURE — 1123F ACP DISCUSS/DSCN MKR DOCD: CPT | Performed by: STUDENT IN AN ORGANIZED HEALTH CARE EDUCATION/TRAINING PROGRAM

## 2025-05-19 PROCEDURE — 3077F SYST BP >= 140 MM HG: CPT | Performed by: STUDENT IN AN ORGANIZED HEALTH CARE EDUCATION/TRAINING PROGRAM

## 2025-05-19 PROCEDURE — G2211 COMPLEX E/M VISIT ADD ON: HCPCS | Performed by: STUDENT IN AN ORGANIZED HEALTH CARE EDUCATION/TRAINING PROGRAM

## 2025-05-19 PROCEDURE — 99214 OFFICE O/P EST MOD 30 MIN: CPT | Performed by: STUDENT IN AN ORGANIZED HEALTH CARE EDUCATION/TRAINING PROGRAM

## 2025-05-19 PROCEDURE — 1159F MED LIST DOCD IN RCRD: CPT | Performed by: STUDENT IN AN ORGANIZED HEALTH CARE EDUCATION/TRAINING PROGRAM

## 2025-05-19 PROCEDURE — 3078F DIAST BP <80 MM HG: CPT | Performed by: STUDENT IN AN ORGANIZED HEALTH CARE EDUCATION/TRAINING PROGRAM

## 2025-05-19 RX ORDER — SODIUM FLUORIDE 6 MG/ML
PASTE, DENTIFRICE DENTAL
COMMUNITY
Start: 2025-04-29

## 2025-05-19 RX ORDER — BENAZEPRIL HYDROCHLORIDE 40 MG/1
TABLET ORAL
Qty: 90 TABLET | Refills: 0 | Status: SHIPPED | OUTPATIENT
Start: 2025-05-19 | End: 2025-05-21 | Stop reason: SDUPTHER

## 2025-05-19 RX ORDER — POTASSIUM CHLORIDE 750 MG/1
CAPSULE, EXTENDED RELEASE ORAL
COMMUNITY
Start: 2025-03-27

## 2025-05-19 RX ORDER — LEVOTHYROXINE SODIUM 112 MCG
TABLET ORAL
Qty: 90 TABLET | Refills: 3 | Status: SHIPPED | OUTPATIENT
Start: 2025-05-19 | End: 2025-05-21

## 2025-05-19 RX ORDER — DILTIAZEM HYDROCHLORIDE 360 MG/1
360 CAPSULE, EXTENDED RELEASE ORAL DAILY
Qty: 90 CAPSULE | Refills: 3 | Status: SHIPPED | OUTPATIENT
Start: 2025-05-19 | End: 2026-05-20

## 2025-05-19 NOTE — PROGRESS NOTES
by mouth daily      Multiple Vitamins-Minerals (PRESERVISION AREDS 2) CAPS Take 2 capsules by mouth daily      Multiple Vitamins-Minerals (WOMENS 50+ MULTI VITAMIN PO) Take 1 tablet by mouth daily      SYNTHROID 112 MCG tablet TAKE 1 TABLET BY MOUTH EVERY DAY ON AN EMPTY STOMACH 90 tablet 3    Cholecalciferol (VITAMIN D-3) 25 MCG (1000 UT) CAPS 6/26/23: Take 1 capsule daily 90 capsule 3    aspirin 81 MG chewable tablet Take 1 tablet by mouth daily Indications: Paroxysmal Atrial Fibrillation 90 tablet 3    fish oil-omega-3 fatty acids 1000 MG capsule Take 1 capsule by mouth in the morning and 1 capsule in the evening. 180 capsule 3    Coenzyme Q10 (CO Q 10) 100 MG CAPS Take 100 mg by mouth daily 90 capsule 3    Calcium Carbonate-Vit D-Min (CALTRATE PLUS PO) Take 1,200 mg by mouth 2 times daily       No current facility-administered medications for this visit.            ROS   Reviewed as above, otherwise negative       Physical Exam   Vitals:   Vitals:    05/19/25 1253   BP: (!) 150/76   Pulse:    Resp:    Temp:    SpO2:        Physical Exam  Vitals reviewed.   Constitutional:       Appearance: Normal appearance.   HENT:      Head: Normocephalic and atraumatic.   Cardiovascular:      Rate and Rhythm: Normal rate and regular rhythm.      Pulses: Normal pulses.      Heart sounds: Normal heart sounds.   Pulmonary:      Effort: Pulmonary effort is normal.      Breath sounds: Normal breath sounds.   Neurological:      Mental Status: She is alert.   Psychiatric:         Mood and Affect: Mood normal.         Behavior: Behavior normal.           Assessment and Plan       1. Essential hypertension  Comments:  Unstable.  Reports it being well-controlled at home.  Currently asymptomatic.  Has improved from previous checks where systolic was in the 180s 190s.  Orders:  -     benazepril (LOTENSIN) 40 MG tablet; TAKE 1 TABLET BY MOUTH EVERY DAY FOR HIGH BLOOD PRESSURE, Disp-90 tablet, R-0Normal  -     dilTIAZem (TIAZAC) 360 MG

## 2025-05-21 DIAGNOSIS — E78.00 PURE HYPERCHOLESTEROLEMIA: ICD-10-CM

## 2025-05-21 DIAGNOSIS — I10 ESSENTIAL HYPERTENSION: ICD-10-CM

## 2025-05-21 DIAGNOSIS — E03.9 ACQUIRED HYPOTHYROIDISM: ICD-10-CM

## 2025-05-21 RX ORDER — BENAZEPRIL HYDROCHLORIDE 40 MG/1
TABLET ORAL
Qty: 90 TABLET | Refills: 0 | Status: SHIPPED | OUTPATIENT
Start: 2025-05-21 | End: 2026-05-22

## 2025-05-21 RX ORDER — LEVOTHYROXINE SODIUM 112 MCG
TABLET ORAL
Qty: 90 TABLET | Refills: 3 | Status: SHIPPED | OUTPATIENT
Start: 2025-05-21 | End: 2026-05-20

## 2025-05-21 RX ORDER — POTASSIUM CHLORIDE 750 MG/1
TABLET, EXTENDED RELEASE ORAL
Qty: 90 TABLET | Refills: 0 | Status: SHIPPED | OUTPATIENT
Start: 2025-05-21 | End: 2026-05-22

## 2025-05-21 RX ORDER — HYDROCORTISONE 25 MG/G
CREAM TOPICAL
Qty: 1 EACH | Refills: 0 | Status: SHIPPED | OUTPATIENT
Start: 2025-05-21

## 2025-05-21 RX ORDER — LEVOTHYROXINE SODIUM 112 MCG
TABLET ORAL
Qty: 90 TABLET | Refills: 3 | Status: SHIPPED | OUTPATIENT
Start: 2025-05-21 | End: 2025-05-21 | Stop reason: SDUPTHER

## 2025-05-21 RX ORDER — ATORVASTATIN CALCIUM 40 MG/1
40 TABLET, FILM COATED ORAL DAILY
Qty: 90 TABLET | Refills: 3 | Status: SHIPPED | OUTPATIENT
Start: 2025-05-21

## 2025-05-21 NOTE — TELEPHONE ENCOUNTER
Name of Medication(s) Requested:  Requested Prescriptions     Pending Prescriptions Disp Refills    benazepril (LOTENSIN) 40 MG tablet 90 tablet 0     Sig: TAKE 1 TABLET BY MOUTH EVERY DAY FOR HIGH BLOOD PRESSURE    potassium chloride (KLOR-CON M10) 10 MEQ extended release tablet 90 tablet 0     Sig: TAKE 1 TABLET BY MOUTH EVERY DAY    hydrocortisone 2.5 % cream 1 each 0     Sig: Apply topically 2 times daily.       Medication is on current medication list Yes    Dosage and directions were verified? Yes    Quantity verified: 90 day supply     Pharmacy Verified?  Yes    Last Appointment:  3/14/2025    Future appts:  Future Appointments   Date Time Provider Department Center   8/20/2025  1:10 PM Ricardo Farooq,  [facility] E[facility] DEP        (If no appt send self scheduling link. .REFILLAPPT)  Scheduling request sent?     [] Yes  [] No    Does patient need updated?  [] Yes  [] No

## 2025-05-29 DIAGNOSIS — I10 ESSENTIAL HYPERTENSION: ICD-10-CM

## 2025-06-09 DIAGNOSIS — I10 ESSENTIAL HYPERTENSION: ICD-10-CM

## 2025-06-09 RX ORDER — POTASSIUM CHLORIDE 750 MG/1
10 CAPSULE, EXTENDED RELEASE ORAL DAILY
Qty: 90 CAPSULE | Refills: 0 | OUTPATIENT
Start: 2025-06-09

## 2025-06-10 DIAGNOSIS — E78.00 PURE HYPERCHOLESTEROLEMIA: ICD-10-CM

## 2025-06-11 RX ORDER — ATORVASTATIN CALCIUM 40 MG/1
40 TABLET, FILM COATED ORAL DAILY
Qty: 90 TABLET | Refills: 0 | OUTPATIENT
Start: 2025-06-11

## 2025-06-16 ENCOUNTER — APPOINTMENT (OUTPATIENT)
Dept: GENERAL RADIOLOGY | Age: 80
DRG: 310 | End: 2025-06-16
Payer: MEDICARE

## 2025-06-16 ENCOUNTER — HOSPITAL ENCOUNTER (INPATIENT)
Age: 80
LOS: 3 days | Discharge: HOME | DRG: 310 | End: 2025-06-19
Attending: EMERGENCY MEDICINE | Admitting: INTERNAL MEDICINE
Payer: MEDICARE

## 2025-06-16 DIAGNOSIS — R00.1 SYMPTOMATIC BRADYCARDIA: ICD-10-CM

## 2025-06-16 DIAGNOSIS — R00.1 JUNCTIONAL BRADYCARDIA: Primary | ICD-10-CM

## 2025-06-16 DIAGNOSIS — I48.0 PAROXYSMAL ATRIAL FIBRILLATION (HCC): ICD-10-CM

## 2025-06-16 LAB
ALBUMIN SERPL-MCNC: 4.2 G/DL (ref 3.5–5.2)
ALP SERPL-CCNC: 70 U/L (ref 35–104)
ALT SERPL-CCNC: 52 U/L (ref 0–35)
ANION GAP SERPL CALCULATED.3IONS-SCNC: 12 MMOL/L (ref 7–16)
AST SERPL-CCNC: 51 U/L (ref 0–35)
BASOPHILS # BLD: 0.01 K/UL (ref 0–0.2)
BASOPHILS NFR BLD: 0 % (ref 0–2)
BILIRUB SERPL-MCNC: 0.4 MG/DL (ref 0–1.2)
BNP SERPL-MCNC: 383 PG/ML (ref 0–450)
BUN SERPL-MCNC: 17 MG/DL (ref 8–23)
CALCIUM SERPL-MCNC: 9.8 MG/DL (ref 8.8–10.2)
CHLORIDE SERPL-SCNC: 102 MMOL/L (ref 98–107)
CO2 SERPL-SCNC: 29 MMOL/L (ref 22–29)
CREAT SERPL-MCNC: 1.1 MG/DL (ref 0.5–1)
EOSINOPHIL # BLD: 0.09 K/UL (ref 0.05–0.5)
EOSINOPHILS RELATIVE PERCENT: 1 % (ref 0–6)
ERYTHROCYTE [DISTWIDTH] IN BLOOD BY AUTOMATED COUNT: 12.7 % (ref 11.5–15)
GFR, ESTIMATED: 54 ML/MIN/1.73M2
GLUCOSE SERPL-MCNC: 132 MG/DL (ref 74–99)
HCT VFR BLD AUTO: 41.4 % (ref 34–48)
HGB BLD-MCNC: 13.4 G/DL (ref 11.5–15.5)
IMM GRANULOCYTES # BLD AUTO: <0.03 K/UL (ref 0–0.58)
IMM GRANULOCYTES NFR BLD: 0 % (ref 0–5)
LYMPHOCYTES NFR BLD: 1.8 K/UL (ref 1.5–4)
LYMPHOCYTES RELATIVE PERCENT: 23 % (ref 20–42)
MCH RBC QN AUTO: 31.5 PG (ref 26–35)
MCHC RBC AUTO-ENTMCNC: 32.4 G/DL (ref 32–34.5)
MCV RBC AUTO: 97.2 FL (ref 80–99.9)
MONOCYTES NFR BLD: 0.63 K/UL (ref 0.1–0.95)
MONOCYTES NFR BLD: 8 % (ref 2–12)
NEUTROPHILS NFR BLD: 67 % (ref 43–80)
NEUTS SEG NFR BLD: 5.26 K/UL (ref 1.8–7.3)
PLATELET # BLD AUTO: 157 K/UL (ref 130–450)
PMV BLD AUTO: 10.9 FL (ref 7–12)
POTASSIUM SERPL-SCNC: 3.9 MMOL/L (ref 3.5–5.1)
PROT SERPL-MCNC: 6.5 G/DL (ref 6.4–8.3)
RBC # BLD AUTO: 4.26 M/UL (ref 3.5–5.5)
SODIUM SERPL-SCNC: 142 MMOL/L (ref 136–145)
T4 FREE SERPL-MCNC: 1.3 NG/DL (ref 0.9–1.7)
TROPONIN I SERPL HS-MCNC: 12 NG/L (ref 0–14)
TROPONIN I SERPL HS-MCNC: 15 NG/L (ref 0–14)
TSH SERPL DL<=0.05 MIU/L-ACNC: 3.89 UIU/ML (ref 0.27–4.2)
WBC OTHER # BLD: 7.8 K/UL (ref 4.5–11.5)

## 2025-06-16 PROCEDURE — 99285 EMERGENCY DEPT VISIT HI MDM: CPT

## 2025-06-16 PROCEDURE — 96374 THER/PROPH/DIAG INJ IV PUSH: CPT

## 2025-06-16 PROCEDURE — 2060000000 HC ICU INTERMEDIATE R&B

## 2025-06-16 PROCEDURE — 99223 1ST HOSP IP/OBS HIGH 75: CPT | Performed by: INTERNAL MEDICINE

## 2025-06-16 PROCEDURE — 84443 ASSAY THYROID STIM HORMONE: CPT

## 2025-06-16 PROCEDURE — 84439 ASSAY OF FREE THYROXINE: CPT

## 2025-06-16 PROCEDURE — 71045 X-RAY EXAM CHEST 1 VIEW: CPT

## 2025-06-16 PROCEDURE — 83880 ASSAY OF NATRIURETIC PEPTIDE: CPT

## 2025-06-16 PROCEDURE — 6360000002 HC RX W HCPCS

## 2025-06-16 PROCEDURE — 80053 COMPREHEN METABOLIC PANEL: CPT

## 2025-06-16 PROCEDURE — 93005 ELECTROCARDIOGRAM TRACING: CPT

## 2025-06-16 PROCEDURE — 85025 COMPLETE CBC W/AUTO DIFF WBC: CPT

## 2025-06-16 PROCEDURE — 84484 ASSAY OF TROPONIN QUANT: CPT

## 2025-06-16 RX ORDER — ONDANSETRON 2 MG/ML
4 INJECTION INTRAMUSCULAR; INTRAVENOUS EVERY 6 HOURS PRN
Status: DISCONTINUED | OUTPATIENT
Start: 2025-06-16 | End: 2025-06-19 | Stop reason: HOSPADM

## 2025-06-16 RX ORDER — SODIUM CHLORIDE 0.9 % (FLUSH) 0.9 %
5-40 SYRINGE (ML) INJECTION EVERY 12 HOURS SCHEDULED
Status: DISCONTINUED | OUTPATIENT
Start: 2025-06-16 | End: 2025-06-19 | Stop reason: HOSPADM

## 2025-06-16 RX ORDER — CALCIUM CARBONATE 500 MG/1
500 TABLET, CHEWABLE ORAL 3 TIMES DAILY PRN
Status: DISCONTINUED | OUTPATIENT
Start: 2025-06-16 | End: 2025-06-19 | Stop reason: HOSPADM

## 2025-06-16 RX ORDER — DILTIAZEM HYDROCHLORIDE 180 MG/1
360 CAPSULE, COATED, EXTENDED RELEASE ORAL DAILY
Status: DISCONTINUED | OUTPATIENT
Start: 2025-06-16 | End: 2025-06-17

## 2025-06-16 RX ORDER — ATORVASTATIN CALCIUM 40 MG/1
40 TABLET, FILM COATED ORAL DAILY
Status: DISCONTINUED | OUTPATIENT
Start: 2025-06-16 | End: 2025-06-19 | Stop reason: HOSPADM

## 2025-06-16 RX ORDER — HYDROCHLOROTHIAZIDE 25 MG/1
25 TABLET ORAL DAILY
Status: DISCONTINUED | OUTPATIENT
Start: 2025-06-17 | End: 2025-06-19 | Stop reason: HOSPADM

## 2025-06-16 RX ORDER — SODIUM CHLORIDE 0.9 % (FLUSH) 0.9 %
5-40 SYRINGE (ML) INJECTION PRN
Status: DISCONTINUED | OUTPATIENT
Start: 2025-06-16 | End: 2025-06-19 | Stop reason: HOSPADM

## 2025-06-16 RX ORDER — MAGNESIUM SULFATE IN WATER 40 MG/ML
2000 INJECTION, SOLUTION INTRAVENOUS PRN
Status: DISCONTINUED | OUTPATIENT
Start: 2025-06-16 | End: 2025-06-19 | Stop reason: HOSPADM

## 2025-06-16 RX ORDER — POTASSIUM CHLORIDE 750 MG/1
10 TABLET, EXTENDED RELEASE ORAL DAILY
Status: DISCONTINUED | OUTPATIENT
Start: 2025-06-17 | End: 2025-06-19 | Stop reason: HOSPADM

## 2025-06-16 RX ORDER — ACETAMINOPHEN 325 MG/1
650 TABLET ORAL EVERY 6 HOURS PRN
Status: DISCONTINUED | OUTPATIENT
Start: 2025-06-16 | End: 2025-06-19 | Stop reason: HOSPADM

## 2025-06-16 RX ORDER — LISINOPRIL 20 MG/1
40 TABLET ORAL DAILY
Status: DISCONTINUED | OUTPATIENT
Start: 2025-06-17 | End: 2025-06-19 | Stop reason: HOSPADM

## 2025-06-16 RX ORDER — POTASSIUM CHLORIDE 1500 MG/1
40 TABLET, EXTENDED RELEASE ORAL PRN
Status: DISCONTINUED | OUTPATIENT
Start: 2025-06-16 | End: 2025-06-19 | Stop reason: HOSPADM

## 2025-06-16 RX ORDER — ACETAMINOPHEN 650 MG/1
650 SUPPOSITORY RECTAL EVERY 6 HOURS PRN
Status: DISCONTINUED | OUTPATIENT
Start: 2025-06-16 | End: 2025-06-19 | Stop reason: HOSPADM

## 2025-06-16 RX ORDER — ONDANSETRON 4 MG/1
4 TABLET, ORALLY DISINTEGRATING ORAL EVERY 8 HOURS PRN
Status: DISCONTINUED | OUTPATIENT
Start: 2025-06-16 | End: 2025-06-19 | Stop reason: HOSPADM

## 2025-06-16 RX ORDER — POLYETHYLENE GLYCOL 3350 17 G/17G
17 POWDER, FOR SOLUTION ORAL DAILY PRN
Status: DISCONTINUED | OUTPATIENT
Start: 2025-06-16 | End: 2025-06-19 | Stop reason: HOSPADM

## 2025-06-16 RX ORDER — CALCIUM GLUCONATE 20 MG/ML
1000 INJECTION, SOLUTION INTRAVENOUS ONCE
Status: COMPLETED | OUTPATIENT
Start: 2025-06-16 | End: 2025-06-16

## 2025-06-16 RX ORDER — ASPIRIN 81 MG/1
81 TABLET, CHEWABLE ORAL DAILY
Status: DISCONTINUED | OUTPATIENT
Start: 2025-06-17 | End: 2025-06-19 | Stop reason: HOSPADM

## 2025-06-16 RX ORDER — POTASSIUM CHLORIDE 7.45 MG/ML
10 INJECTION INTRAVENOUS PRN
Status: DISCONTINUED | OUTPATIENT
Start: 2025-06-16 | End: 2025-06-19 | Stop reason: HOSPADM

## 2025-06-16 RX ORDER — LEVOTHYROXINE SODIUM 112 UG/1
112 TABLET ORAL DAILY
Status: DISCONTINUED | OUTPATIENT
Start: 2025-06-17 | End: 2025-06-19 | Stop reason: HOSPADM

## 2025-06-16 RX ORDER — HYDRALAZINE HYDROCHLORIDE 20 MG/ML
10 INJECTION INTRAMUSCULAR; INTRAVENOUS EVERY 6 HOURS PRN
Status: DISCONTINUED | OUTPATIENT
Start: 2025-06-16 | End: 2025-06-19 | Stop reason: HOSPADM

## 2025-06-16 RX ORDER — ATROPINE SULFATE 0.1 MG/ML
1 INJECTION INTRAVENOUS ONCE
Status: COMPLETED | OUTPATIENT
Start: 2025-06-16 | End: 2025-06-16

## 2025-06-16 RX ORDER — BENZONATATE 100 MG/1
100 CAPSULE ORAL 3 TIMES DAILY PRN
Status: DISCONTINUED | OUTPATIENT
Start: 2025-06-16 | End: 2025-06-19 | Stop reason: HOSPADM

## 2025-06-16 RX ORDER — SODIUM CHLORIDE 9 MG/ML
INJECTION, SOLUTION INTRAVENOUS PRN
Status: DISCONTINUED | OUTPATIENT
Start: 2025-06-16 | End: 2025-06-19 | Stop reason: HOSPADM

## 2025-06-16 RX ADMIN — ATROPINE SULFATE 1 MG: 0.1 INJECTION INTRAVENOUS at 17:54

## 2025-06-16 RX ADMIN — CALCIUM GLUCONATE 1000 MG: 20 INJECTION, SOLUTION INTRAVENOUS at 16:41

## 2025-06-16 NOTE — H&P
Inpatient H&P      PCP:  Ricardo Farooq DO  Admitting Physician:  Ashley Weaver DO  Consultants:  EP  Chief Complaint:    Chief Complaint   Patient presents with    Palpitations     Pt having palpations and lightheadedness. HR 32-34. 1 mg of atropine given by EMS  HR 46 now. PT A&O x 4       History of Present Illness  Jaida Virk is a 79 y.o. female who presents to [facility] ER complaining of palpitations and lightheadness.    Jaida Virk has a past medical history that includes hypertension, hyperlipidemia, hypothyroidism    Jaida presents to the ER with complaint of palpitations and lightheadedness.  She has been having lightheadedness intermittently over the past few weeks that normally resolves on its own.  Today her lightheadedness did not resolve.  She does admit to intermittent palpitations.  In the ER she was found to be bradycardic.  She required atropine in the ER.  EP was consulted from the ER.  Discussed patient's case with ED physician.    ER Course  Upon presentation to the ER, routine labwork was performed which revealed glucose 132, ALT 52, AST 51.  Imaging results are as outlined below in the Imaging section of this note.  .  Upon arrival to the ER, patient was 130/77 and bradycardic at 45.  The patient received atropine, calcium in the emergency room and was admitted to [facility].    Last Hospital Admission -   None in EMR    Last Echocardiogram -I personally reviewed previous echocardiogram results from 8/11/2019   Left ventricular internal dimensions were normal in diastole and systole.   Borderline concentric left ventricular hypertrophy.   No regional wall motion abnormalities seen.   Normal left ventricular ejection fraction.   There is doppler evidence of stage I diastolic dysfunction.   Mild mitral regurgitation is present.     ED TRIAGE VITALS  BP: (!) 119/91, Temp: 98.5 °F (36.9 °C), Pulse: (!) 44, Respirations: 22, SpO2: 94 %    Vitals:    06/16/25 1615 06/16/25

## 2025-06-16 NOTE — ED PROVIDER NOTES
[facility] EMERGENCY DEPARTMENT  EMERGENCY DEPARTMENT ENCOUNTER        Pt Name: Jaida iVrk  MRN: [id number]  Birthdate 1945  Date of evaluation: 6/16/2025  Provider: Henry Graves MD  PCP: Ricardo Farooq DO  Note Started: 4:09 PM EDT 6/16/25    CHIEF COMPLAINT       Chief Complaint   Patient presents with    Palpitations     Pt having palpations and lightheadedness. HR 32-34. 1 mg of atropine given by EMS  HR 46 now. PT A&O x 4     HISTORY OF PRESENT ILLNESS: 1 or more Elements   History From: Patient    Limitations to history : None    Jaida Virk is a 79 y.o. female with past medical history of hypertension, hyperlipidemia, hypothyroidism, obesity, exertional dyspnea, gastritis, paroxysmal A-fib who presents from home with complaints of palpitations and lightheadedness.  Patient states she has had lightheadedness intermittently for the past several weeks but states this usually resolves on its own and states today she began having lightheadedness that did not resolve.  Patient endorses lightheadedness with ambulation at this time and states she will intermittently feel as if her heart is \"beating on\".  Denies any chest pain or shortness of breath, abdominal pain, nausea, vomit, diarrhea, dysuria, hematuria, falls or trauma, numbness or tingling in extremities, dizziness or lightheadedness at rest, fevers or chills.  Denies tobacco, EtOH, illicit drug use.    Nursing Notes were all reviewed and agreed with or any disagreements were addressed in the HPI.    ROS:   Pertinent positives and negatives are stated within HPI, all other systems reviewed and are negative.    --------------------------------------------- PAST HISTORY ---------------------------------------------  Past Medical History:  has a past medical history of Atrial fibrillation (HCC), Gastritis with hemorrhage, Hyperlipidemia, Hypertension, Hypothyroidism, Obesity (BMI 30.0-34.9), Paroxysmal  chloride infusion (has no administration in time range)   potassium chloride (KLOR-CON M) extended release tablet 40 mEq (has no administration in time range)     Or   potassium bicarb-citric acid (EFFER-K) effervescent tablet 40 mEq (has no administration in time range)     Or   potassium chloride 10 mEq/100 mL IVPB (Peripheral Line) (has no administration in time range)   magnesium sulfate 2000 mg in 50 mL IVPB premix (has no administration in time range)   ondansetron (ZOFRAN-ODT) disintegrating tablet 4 mg (has no administration in time range)     Or   ondansetron (ZOFRAN) injection 4 mg (has no administration in time range)   polyethylene glycol (GLYCOLAX) packet 17 g (has no administration in time range)   acetaminophen (TYLENOL) tablet 650 mg (has no administration in time range)     Or   acetaminophen (TYLENOL) suppository 650 mg (has no administration in time range)   aspirin chewable tablet 81 mg (has no administration in time range)   atorvastatin (LIPITOR) tablet 40 mg (has no administration in time range)   lisinopril (PRINIVIL;ZESTRIL) tablet 40 mg ( Oral Automatically Held 6/20/25 0900)   dilTIAZem (CARDIZEM CD) extended release capsule 360 mg ( Oral Automatically Held 6/19/25 0900)   hydroCHLOROthiazide (HYDRODIURIL) tablet 25 mg ( Oral Automatically Held 6/20/25 0900)   potassium chloride (KLOR-CON M) extended release tablet 10 mEq (has no administration in time range)   levothyroxine (SYNTHROID) tablet 112 mcg (has no administration in time range)   calcium gluconate 1,000 mg in sodium chloride 50 mL (0 mg IntraVENous Stopped 6/16/25 1652)   atropine injection 1 mg (1 mg IntraVENous Given 6/16/25 1754)       Medical Decision Making/Differential Diagnosis:    CC/HPI Summary, Pertinent Physical Exam Findings, Social Determinants of health, Records Reviewed, DDx, testing done/not done, ED Course, Reassessment, disposition considerations/shared decision making with patient, consults, disposition:

## 2025-06-16 NOTE — ED NOTES
Pt again told RN that she needed to urinate. PT was offered both bed pan or pure wick. PT upset but willing to try bed pan. PT placed on bed pan and scooted herself off stating this isn't going to work. Pt then stated she would try pure wick. Pure wick applied to pt at this time.

## 2025-06-16 NOTE — ED NOTES
Pt informed PCA that she had to urinate. RN instructed her that she is currently unable to get up dt dizziness and bradycardia. PT given option of using bed pan or applying pure wick. Pt stated she was not doing that and that she would pee the bed and we could clean her up. RN confirmed with Ely Worthy that pt is unable to get up. Attempted to educate pt again and refusing pure wick at this time.

## 2025-06-17 ENCOUNTER — APPOINTMENT (OUTPATIENT)
Age: 80
DRG: 310 | End: 2025-06-17
Payer: MEDICARE

## 2025-06-17 PROBLEM — I49.5 SINUS NODE DYSFUNCTION (HCC): Status: ACTIVE | Noted: 2025-06-17

## 2025-06-17 LAB
ALBUMIN SERPL-MCNC: 4.3 G/DL (ref 3.5–5.2)
ALP SERPL-CCNC: 75 U/L (ref 35–104)
ALT SERPL-CCNC: 51 U/L (ref 0–35)
ANION GAP SERPL CALCULATED.3IONS-SCNC: 14 MMOL/L (ref 7–16)
AST SERPL-CCNC: 40 U/L (ref 0–35)
BILIRUB SERPL-MCNC: 0.5 MG/DL (ref 0–1.2)
BUN SERPL-MCNC: 13 MG/DL (ref 8–23)
CALCIUM SERPL-MCNC: 9.9 MG/DL (ref 8.8–10.2)
CHLORIDE SERPL-SCNC: 105 MMOL/L (ref 98–107)
CHOLEST SERPL-MCNC: 154 MG/DL
CO2 SERPL-SCNC: 25 MMOL/L (ref 22–29)
CREAT SERPL-MCNC: 0.8 MG/DL (ref 0.5–1)
ECHO AO ASC DIAM: 3.3 CM
ECHO AO ASCENDING AORTA INDEX: 1.89 CM/M2
ECHO AR MAX VEL PISA: 4.3 M/S
ECHO AV AREA PEAK VELOCITY: 2.6 CM2
ECHO AV AREA VTI: 2.8 CM2
ECHO AV AREA/BSA PEAK VELOCITY: 1.5 CM2/M2
ECHO AV AREA/BSA VTI: 1.6 CM2/M2
ECHO AV CUSP MM: 1.9 CM
ECHO AV MEAN GRADIENT: 6 MMHG
ECHO AV MEAN VELOCITY: 1.1 M/S
ECHO AV PEAK GRADIENT: 12 MMHG
ECHO AV PEAK VELOCITY: 1.7 M/S
ECHO AV REGURGITANT PHT: 482.9 MS
ECHO AV VELOCITY RATIO: 0.82
ECHO AV VTI: 38 CM
ECHO BSA: 1.79 M2
ECHO EST RA PRESSURE: 3 MMHG
ECHO LA DIAMETER INDEX: 1.83 CM/M2
ECHO LA DIAMETER: 3.2 CM
ECHO LA VOL A-L A2C: 49 ML (ref 22–52)
ECHO LA VOL A-L A4C: 60 ML (ref 22–52)
ECHO LA VOL BP: 56 ML (ref 22–52)
ECHO LA VOL MOD A2C: 47 ML (ref 22–52)
ECHO LA VOL MOD A4C: 59 ML (ref 22–52)
ECHO LA VOL/BSA BIPLANE: 32 ML/M2 (ref 16–34)
ECHO LA VOLUME AREA LENGTH: 58 ML
ECHO LA VOLUME INDEX A-L A2C: 28 ML/M2 (ref 16–34)
ECHO LA VOLUME INDEX A-L A4C: 34 ML/M2 (ref 16–34)
ECHO LA VOLUME INDEX AREA LENGTH: 33 ML/M2 (ref 16–34)
ECHO LA VOLUME INDEX MOD A2C: 27 ML/M2 (ref 16–34)
ECHO LA VOLUME INDEX MOD A4C: 34 ML/M2 (ref 16–34)
ECHO LV E' LATERAL VELOCITY: 7 CM/S
ECHO LV E' SEPTAL VELOCITY: 6 CM/S
ECHO LV EF PHYSICIAN: 70 %
ECHO LV FRACTIONAL SHORTENING: 39 % (ref 28–44)
ECHO LV INTERNAL DIMENSION DIASTOLE INDEX: 2.17 CM/M2
ECHO LV INTERNAL DIMENSION DIASTOLIC: 3.8 CM (ref 3.9–5.3)
ECHO LV INTERNAL DIMENSION SYSTOLIC INDEX: 1.31 CM/M2
ECHO LV INTERNAL DIMENSION SYSTOLIC: 2.3 CM
ECHO LV ISOVOLUMETRIC RELAXATION TIME (IVRT): 125.6 MS
ECHO LV IVSD: 1.1 CM (ref 0.6–0.9)
ECHO LV IVSS: 1.3 CM
ECHO LV MASS 2D: 125.8 G (ref 67–162)
ECHO LV MASS INDEX 2D: 71.9 G/M2 (ref 43–95)
ECHO LV POSTERIOR WALL DIASTOLIC: 1 CM (ref 0.6–0.9)
ECHO LV POSTERIOR WALL SYSTOLIC: 1.5 CM
ECHO LV RELATIVE WALL THICKNESS RATIO: 0.53
ECHO LVOT AREA: 3.1 CM2
ECHO LVOT AV VTI INDEX: 0.87
ECHO LVOT DIAM: 2 CM
ECHO LVOT MEAN GRADIENT: 5 MMHG
ECHO LVOT PEAK GRADIENT: 8 MMHG
ECHO LVOT PEAK VELOCITY: 1.4 M/S
ECHO LVOT STROKE VOLUME INDEX: 59.4 ML/M2
ECHO LVOT SV: 103.9 ML
ECHO LVOT VTI: 33.1 CM
ECHO MV "A" WAVE DURATION: 140.8 MSEC
ECHO MV A VELOCITY: 1.29 M/S
ECHO MV AREA PHT: 3.4 CM2
ECHO MV AREA VTI: 4.8 CM2
ECHO MV E DECELERATION TIME (DT): 217.9 MS
ECHO MV E VELOCITY: 0.78 M/S
ECHO MV E/A RATIO: 0.6
ECHO MV E/E' LATERAL: 11.14
ECHO MV E/E' RATIO (AVERAGED): 12.07
ECHO MV E/E' SEPTAL: 13
ECHO MV LVOT VTI INDEX: 0.65
ECHO MV MAX VELOCITY: 1.3 M/S
ECHO MV MEAN GRADIENT: 3 MMHG
ECHO MV MEAN VELOCITY: 0.8 M/S
ECHO MV PEAK GRADIENT: 7 MMHG
ECHO MV PRESSURE HALF TIME (PHT): 64.7 MS
ECHO MV VTI: 21.6 CM
ECHO PV MAX VELOCITY: 1.3 M/S
ECHO PV MEAN GRADIENT: 4 MMHG
ECHO PV MEAN VELOCITY: 1 M/S
ECHO PV PEAK GRADIENT: 6 MMHG
ECHO PV VTI: 27.3 CM
ECHO RV INTERNAL DIMENSION: 2.4 CM
ECHO RV LONGITUDINAL DIMENSION: 5.9 CM
ECHO RV MID DIMENSION: 3 CM
ECHO RV TAPSE: 2.2 CM (ref 1.7–?)
EKG ATRIAL RATE: 54 BPM
EKG Q-T INTERVAL: 464 MS
EKG QRS DURATION: 80 MS
EKG QTC CALCULATION (BAZETT): 406 MS
EKG R AXIS: 15 DEGREES
EKG T AXIS: 59 DEGREES
EKG VENTRICULAR RATE: 46 BPM
ERYTHROCYTE [DISTWIDTH] IN BLOOD BY AUTOMATED COUNT: 12.7 % (ref 11.5–15)
GFR, ESTIMATED: 79 ML/MIN/1.73M2
GLUCOSE SERPL-MCNC: 104 MG/DL (ref 74–99)
HBA1C MFR BLD: 5.6 % (ref 4–5.6)
HCT VFR BLD AUTO: 43.1 % (ref 34–48)
HDLC SERPL-MCNC: 46 MG/DL
HGB BLD-MCNC: 14.4 G/DL (ref 11.5–15.5)
LDLC SERPL CALC-MCNC: 72 MG/DL
MAGNESIUM SERPL-MCNC: 1.8 MG/DL (ref 1.6–2.4)
MCH RBC QN AUTO: 31.7 PG (ref 26–35)
MCHC RBC AUTO-ENTMCNC: 33.4 G/DL (ref 32–34.5)
MCV RBC AUTO: 94.9 FL (ref 80–99.9)
PHOSPHATE SERPL-MCNC: 3.1 MG/DL (ref 2.5–4.5)
PLATELET # BLD AUTO: 169 K/UL (ref 130–450)
PMV BLD AUTO: 11.1 FL (ref 7–12)
POTASSIUM SERPL-SCNC: 3.5 MMOL/L (ref 3.5–5.1)
PROT SERPL-MCNC: 6.9 G/DL (ref 6.4–8.3)
RBC # BLD AUTO: 4.54 M/UL (ref 3.5–5.5)
SODIUM SERPL-SCNC: 144 MMOL/L (ref 136–145)
TRIGL SERPL-MCNC: 181 MG/DL
VLDLC SERPL CALC-MCNC: 36 MG/DL
WBC OTHER # BLD: 9.6 K/UL (ref 4.5–11.5)

## 2025-06-17 PROCEDURE — 93306 TTE W/DOPPLER COMPLETE: CPT

## 2025-06-17 PROCEDURE — 2500000003 HC RX 250 WO HCPCS: Performed by: INTERNAL MEDICINE

## 2025-06-17 PROCEDURE — 99223 1ST HOSP IP/OBS HIGH 75: CPT | Performed by: INTERNAL MEDICINE

## 2025-06-17 PROCEDURE — 84100 ASSAY OF PHOSPHORUS: CPT

## 2025-06-17 PROCEDURE — 93010 ELECTROCARDIOGRAM REPORT: CPT | Performed by: INTERNAL MEDICINE

## 2025-06-17 PROCEDURE — 83036 HEMOGLOBIN GLYCOSYLATED A1C: CPT

## 2025-06-17 PROCEDURE — 2060000000 HC ICU INTERMEDIATE R&B

## 2025-06-17 PROCEDURE — 6370000000 HC RX 637 (ALT 250 FOR IP): Performed by: INTERNAL MEDICINE

## 2025-06-17 PROCEDURE — 6370000000 HC RX 637 (ALT 250 FOR IP): Performed by: STUDENT IN AN ORGANIZED HEALTH CARE EDUCATION/TRAINING PROGRAM

## 2025-06-17 PROCEDURE — 99232 SBSQ HOSP IP/OBS MODERATE 35: CPT | Performed by: STUDENT IN AN ORGANIZED HEALTH CARE EDUCATION/TRAINING PROGRAM

## 2025-06-17 PROCEDURE — 6360000002 HC RX W HCPCS: Performed by: INTERNAL MEDICINE

## 2025-06-17 PROCEDURE — 80053 COMPREHEN METABOLIC PANEL: CPT

## 2025-06-17 PROCEDURE — 85027 COMPLETE CBC AUTOMATED: CPT

## 2025-06-17 PROCEDURE — 93306 TTE W/DOPPLER COMPLETE: CPT | Performed by: INTERNAL MEDICINE

## 2025-06-17 PROCEDURE — 80061 LIPID PANEL: CPT

## 2025-06-17 PROCEDURE — 36415 COLL VENOUS BLD VENIPUNCTURE: CPT

## 2025-06-17 PROCEDURE — 93005 ELECTROCARDIOGRAM TRACING: CPT | Performed by: INTERNAL MEDICINE

## 2025-06-17 PROCEDURE — 83735 ASSAY OF MAGNESIUM: CPT

## 2025-06-17 PROCEDURE — 6360000002 HC RX W HCPCS

## 2025-06-17 RX ORDER — HYDRALAZINE HYDROCHLORIDE 20 MG/ML
10 INJECTION INTRAMUSCULAR; INTRAVENOUS ONCE
Status: COMPLETED | OUTPATIENT
Start: 2025-06-17 | End: 2025-06-17

## 2025-06-17 RX ORDER — DIPHENHYDRAMINE HCL 25 MG
25 TABLET ORAL NIGHTLY PRN
Status: DISCONTINUED | OUTPATIENT
Start: 2025-06-17 | End: 2025-06-19 | Stop reason: HOSPADM

## 2025-06-17 RX ORDER — DILTIAZEM HYDROCHLORIDE 30 MG/1
30 TABLET, FILM COATED ORAL EVERY 6 HOURS SCHEDULED
Status: DISCONTINUED | OUTPATIENT
Start: 2025-06-17 | End: 2025-06-17

## 2025-06-17 RX ORDER — DILTIAZEM HYDROCHLORIDE 30 MG/1
60 TABLET, FILM COATED ORAL EVERY 6 HOURS SCHEDULED
Status: DISCONTINUED | OUTPATIENT
Start: 2025-06-17 | End: 2025-06-18

## 2025-06-17 RX ORDER — DOCUSATE SODIUM 100 MG/1
100 CAPSULE, LIQUID FILLED ORAL DAILY
Status: DISCONTINUED | OUTPATIENT
Start: 2025-06-17 | End: 2025-06-19

## 2025-06-17 RX ADMIN — Medication 3 MG: at 00:37

## 2025-06-17 RX ADMIN — ATORVASTATIN CALCIUM 40 MG: 40 TABLET, FILM COATED ORAL at 21:18

## 2025-06-17 RX ADMIN — SODIUM CHLORIDE, PRESERVATIVE FREE 10 ML: 5 INJECTION INTRAVENOUS at 21:22

## 2025-06-17 RX ADMIN — HYDRALAZINE HYDROCHLORIDE 10 MG: 20 INJECTION, SOLUTION INTRAMUSCULAR; INTRAVENOUS at 06:31

## 2025-06-17 RX ADMIN — SODIUM CHLORIDE, PRESERVATIVE FREE 10 ML: 5 INJECTION INTRAVENOUS at 11:06

## 2025-06-17 RX ADMIN — APIXABAN 5 MG: 5 TABLET, FILM COATED ORAL at 21:18

## 2025-06-17 RX ADMIN — DILTIAZEM HYDROCHLORIDE 60 MG: 30 TABLET, FILM COATED ORAL at 23:38

## 2025-06-17 RX ADMIN — HYDRALAZINE HYDROCHLORIDE 10 MG: 20 INJECTION INTRAMUSCULAR; INTRAVENOUS at 16:03

## 2025-06-17 RX ADMIN — ASPIRIN 81 MG: 81 TABLET, CHEWABLE ORAL at 11:03

## 2025-06-17 RX ADMIN — DILTIAZEM HYDROCHLORIDE 60 MG: 30 TABLET, FILM COATED ORAL at 18:44

## 2025-06-17 RX ADMIN — DOCUSATE SODIUM 100 MG: 100 CAPSULE, LIQUID FILLED ORAL at 13:46

## 2025-06-17 RX ADMIN — POTASSIUM CHLORIDE 10 MEQ: 750 TABLET, EXTENDED RELEASE ORAL at 11:03

## 2025-06-17 RX ADMIN — ATORVASTATIN CALCIUM 40 MG: 40 TABLET, FILM COATED ORAL at 00:34

## 2025-06-17 RX ADMIN — LISINOPRIL 40 MG: 20 TABLET ORAL at 11:03

## 2025-06-17 RX ADMIN — SODIUM CHLORIDE, PRESERVATIVE FREE 10 ML: 5 INJECTION INTRAVENOUS at 00:37

## 2025-06-17 RX ADMIN — BENZONATATE 100 MG: 100 CAPSULE ORAL at 00:37

## 2025-06-17 NOTE — PROGRESS NOTES
Patient's HR sustained 130s-140s around 5:40pm. Dr. Garcia notified and present on floor. Dr. Walters also notified. Patient is in afib. Dr. Garcia to place orders.  Electronically signed by Eva Ny RN on 6/17/2025 at 6:15 PM

## 2025-06-17 NOTE — PROGRESS NOTES
Chart accessed pending admission to PICU.  Electronically signed by Eva Ny RN on 6/17/2025 at 7:29 AM

## 2025-06-17 NOTE — PLAN OF CARE
Problem: Discharge Planning  Goal: Discharge to home or other facility with appropriate resources  Outcome: Progressing  Flowsheets (Taken 6/17/2025 0818)  Discharge to home or other facility with appropriate resources: Identify barriers to discharge with patient and caregiver     Problem: Safety - Adult  Goal: Free from fall injury  Outcome: Progressing     Problem: ABCDS Injury Assessment  Goal: Absence of physical injury  Outcome: Progressing     Problem: Cardiovascular - Adult  Goal: Maintains optimal cardiac output and hemodynamic stability  Outcome: Progressing  Goal: Absence of cardiac dysrhythmias or at baseline  Outcome: Progressing

## 2025-06-17 NOTE — PROGRESS NOTES
4 Eyes Skin Assessment     NAME:  Jaida Virk  YOB: 1945  MEDICAL RECORD NUMBER:  [id number]    The patient is being assessed for  Admission    I agree that at least one RN has performed a thorough Head to Toe Skin Assessment on the patient. ALL assessment sites listed below have been assessed.      Areas assessed by both nurses:    Head, Face, Ears, Shoulders, Back, Chest, Arms, Elbows, Hands, Sacrum. Buttock, Coccyx, Ischium, Legs. Feet and Heels, and Under Medical Devices         Does the Patient have a Wound? No noted wound(s)       Emanuel Prevention initiated by RN: Yes  Wound Care Orders initiated by RN: No    Pressure Injury (Stage 3,4, Unstageable, DTI, NWPT, and Complex wounds) if present, place Wound referral order by RN under : No    New Ostomies, if present place, Ostomy referral order under : No     Nurse 1 eSignature: Electronically signed by Eva Ny RN on 6/17/25 at 3:47 PM EDT    **SHARE this note so that the co-signing nurse can place an eSignature**    Nurse 2 eSignature: {Esignature:[id number]}

## 2025-06-17 NOTE — PROGRESS NOTES
Attempted to give AM meds, pt stated she has been waiting for 8 hours to eat and will take them later. Instructed to use call light when done with breakfast and ready to take medications as ordered. Electronically signed by Ayleen Hook RN on 6/17/2025 at 10:37 AM

## 2025-06-17 NOTE — CONSULTS
[facility]YS[facility]- The [facility] and V[facility] [place] Electrophysiology  Consultation Report  PATIENT: Jaida Virk  MEDICAL RECORD NUMBER: [id number]  DATE OF SERVICE:  6/17/2025  ATTENDING ELECTROPHYSIOLOGIST: Meaagn Garcia MD  PRIMARY ELECTROPHYSIOLOGIST: Meagan Garcia MD  REFERRING PHYSICIAN: No ref. provider found and Ricardo Farooq DO  CHIEF COMPLAINT: lightheadedness, dizzy    HPI: This is a 79 y.o. female with a history of HTN, HLD, hypothyroidism, paroxysmal atrial fibrillation and Vitamin D deficiency. She is unknown to [facility] group. Maintained on ASA, Lipitor, Lotensin,  Diltiazem, HCTZ, Synthroid by her PMD.  She says she has seen Dr. Frye from cardiology in the past but no office notes were available for review    Patient presented to [facility] with complaints of palpitations and lightheadedness for about 2 weeks.  HR was noted to be 32-34. 1mg of atropine was given by EMS which brought her HR up to 46bpm. She has had lightheadedness intermittently for the past several weeks but states this usually resolves on its own. She reports she had lightheadedness today that did not resolve. She reports being lightheaded with ambulation and she states she'll intermittently feel her heart \"beating on.\"  No episodes of syncope.  No other cardiac symptoms of chest pain or shortness of breath with exertion.  She is able to ambulate without difficulty.  On presentation the patient was in a junctional rhythm at about 40 bpm as mentioned above but diltiazem was stopped and her heart rate improved to 90 bpm-sinus rhythm.  However about 5 PM this evening she developed atrial fibrillation with rapid ventricular rate-120 to 140 bpm.  No symptoms associated with recurrence of atrial fibrillation   Cardiac electrophysiology service is consulted for bradycardia, paroxysmal atrial fibrillation    Patient Active Problem List    Diagnosis Date Noted    Synovitis of right ankle 07/21/2022     Priority: Medium       Lab Results   Component Value Date/Time    MG 1.8 2025 06:56 AM     Recent Labs     25  1613   TSH 3.89     No results for input(s): \"INR\" in the last 72 hours.    CXR: 25  FINDINGS:  The heart is enlarged.  Pulmonary vessels are prominent.  The lungs are  clear.  No pneumothorax or large pleural effusion.     IMPRESSION:  Cardiomegaly with mild pulmonary vascular congestion.    Telemetry: NSR with rate in the 90's earlier today, atrial fibrillation with ventricular rate of 130 to 140 bpm now    EK2025:; junctional bradycardia with rate of 46 bpm Please see scan in Cardiology.    Echocardiogram:   25    Interpretation Summary  Show Result Comparison     Left Ventricle: Hyperdynamic left ventricular systolic function with a visually estimated EF of 65 - 70%. Left ventricle size is normal. Normal wall thickness. Normal wall motion. Normal diastolic function for age.    Right Ventricle: Right ventricle size is normal. Normal systolic function. TAPSE is 2.2 cm.    Left Atrium: Left atrial volume index is normal (16-34 mL/m2) mL/m2.    Aortic Valve: Mild regurgitation.    Mitral Valve: Mildly thickened leaflets. Trace regurgitation.    Pericardium: No pericardial effusion.        Echo Findings    Left Ventricle Hyperdynamic left ventricular systolic function with a visually estimated EF of 65 - 70%. Left ventricle size is normal. Normal wall thickness. Normal wall motion. Normal diastolic function for age.   Left Atrium Left atrial volume index is normal (16-34 mL/m2) mL/m2.   Right Ventricle Right ventricle size is normal. Normal systolic function. TAPSE is 2.2 cm.   Right Atrium Right atrium size is normal.   Aortic Valve Trileaflet valve. Mild regurgitation. No stenosis.   Mitral Valve Mildly thickened leaflets. Trace regurgitation. No stenosis noted.   Tricuspid Valve Valve structure is normal. Trace regurgitation. No stenosis noted.   Pulmonic Valve Valve structure is normal. Trace

## 2025-06-17 NOTE — PROGRESS NOTES
Hospitalist Progress Note      SYNOPSIS: Patient admitted on 2025 for Symptomatic bradycardia  79-year-old lady with past medical history of hypertension, hyperlipidemia, hypothyroidism presented to the hospital with chief complaint of palpitation, lightheadedness.  She was found to be bradycardic at emergency department and received atropine  Upon presentation to the ER, routine labwork was performed which revealed glucose 132, ALT 52, AST 51   Blood pressure 130/77, heart rate 45  Patient admitted under internal medicine service to the EP consulted    SUBJECTIVE:  Stable overnight. No other overnight issues reported.   Patient seen and examined at bedside today and patient denies any chest pain, shortness of breath.  Patient wants to eat something, she feels she is thirsty.  Records reviewed.           Temp (24hrs), Av.2 °F (36.8 °C), Min:98 °F (36.7 °C), Max:98.5 °F (36.9 °C)    DIET: ADULT DIET; Regular  CODE: Full Code  No intake or output data in the 24 hours ending 25 1053    Review of Systems  All bolded are positive; please see HPI  General:  Fever, chills, diaphoresis, fatigue, malaise, night sweats, weight loss  Psychological:  Anxiety, disorientation, hallucinations.  ENT:  Epistaxis, headaches, vertigo, visual changes.  Cardiovascular:  Chest pain, irregular heartbeats, palpitations, paroxysmal nocturnal dyspnea.  Respiratory:  Shortness of breath, coughing, sputum production, hemoptysis, wheezing, orthopnea.  Gastrointestinal:  Nausea, vomiting, diarrhea, heartburn, constipation, abdominal pain, hematemesis, hematochezia, melena, acholic stools  Genito-Urinary:  Dysuria, urgency, frequency, hematuria  Musculoskeletal:  Joint pain, joint stiffness, joint swelling, muscle pain  Neurology:  Headache, focal neurological deficits, weakness, numbness, paresthesia  Derm:  Rashes, ulcers, excoriations, bruising  Extremities:  Decreased ROM, peripheral edema, mottling      OBJECTIVE:    BP

## 2025-06-18 LAB
EKG ATRIAL RATE: 102 BPM
EKG P AXIS: 67 DEGREES
EKG P-R INTERVAL: 152 MS
EKG Q-T INTERVAL: 306 MS
EKG QRS DURATION: 84 MS
EKG QTC CALCULATION (BAZETT): 472 MS
EKG R AXIS: -7 DEGREES
EKG T AXIS: -20 DEGREES
EKG VENTRICULAR RATE: 143 BPM

## 2025-06-18 PROCEDURE — 2060000000 HC ICU INTERMEDIATE R&B

## 2025-06-18 PROCEDURE — 99232 SBSQ HOSP IP/OBS MODERATE 35: CPT | Performed by: STUDENT IN AN ORGANIZED HEALTH CARE EDUCATION/TRAINING PROGRAM

## 2025-06-18 PROCEDURE — 6360000002 HC RX W HCPCS: Performed by: INTERNAL MEDICINE

## 2025-06-18 PROCEDURE — 2500000003 HC RX 250 WO HCPCS: Performed by: INTERNAL MEDICINE

## 2025-06-18 PROCEDURE — 99233 SBSQ HOSP IP/OBS HIGH 50: CPT | Performed by: INTERNAL MEDICINE

## 2025-06-18 PROCEDURE — 6370000000 HC RX 637 (ALT 250 FOR IP): Performed by: INTERNAL MEDICINE

## 2025-06-18 PROCEDURE — 97535 SELF CARE MNGMENT TRAINING: CPT

## 2025-06-18 PROCEDURE — 97165 OT EVAL LOW COMPLEX 30 MIN: CPT

## 2025-06-18 PROCEDURE — 6370000000 HC RX 637 (ALT 250 FOR IP): Performed by: STUDENT IN AN ORGANIZED HEALTH CARE EDUCATION/TRAINING PROGRAM

## 2025-06-18 PROCEDURE — 93010 ELECTROCARDIOGRAM REPORT: CPT | Performed by: INTERNAL MEDICINE

## 2025-06-18 RX ORDER — SENNOSIDES 8.6 MG/1
1 TABLET ORAL 2 TIMES DAILY
Qty: 60 TABLET | Refills: 0 | Status: SHIPPED | OUTPATIENT
Start: 2025-06-18 | End: 2025-07-18

## 2025-06-18 RX ORDER — SENNOSIDES 8.6 MG/1
1 TABLET ORAL 2 TIMES DAILY
Status: DISCONTINUED | OUTPATIENT
Start: 2025-06-18 | End: 2025-06-19 | Stop reason: HOSPADM

## 2025-06-18 RX ORDER — HYDRALAZINE HYDROCHLORIDE 50 MG/1
25 TABLET, FILM COATED ORAL EVERY 8 HOURS SCHEDULED
Qty: 45 TABLET | Refills: 3 | Status: SHIPPED | OUTPATIENT
Start: 2025-06-18

## 2025-06-18 RX ORDER — HYDRALAZINE HYDROCHLORIDE 50 MG/1
50 TABLET, FILM COATED ORAL EVERY 8 HOURS SCHEDULED
Qty: 90 TABLET | Refills: 3 | Status: SHIPPED | OUTPATIENT
Start: 2025-06-18 | End: 2025-06-18

## 2025-06-18 RX ORDER — DILTIAZEM HYDROCHLORIDE 240 MG/1
240 CAPSULE, COATED, EXTENDED RELEASE ORAL DAILY
Qty: 30 CAPSULE | Refills: 3 | Status: SHIPPED | OUTPATIENT
Start: 2025-06-18

## 2025-06-18 RX ORDER — DILTIAZEM HYDROCHLORIDE 120 MG/1
240 CAPSULE, COATED, EXTENDED RELEASE ORAL DAILY
Status: DISCONTINUED | OUTPATIENT
Start: 2025-06-18 | End: 2025-06-19 | Stop reason: HOSPADM

## 2025-06-18 RX ORDER — HYDRALAZINE HYDROCHLORIDE 50 MG/1
50 TABLET, FILM COATED ORAL EVERY 8 HOURS SCHEDULED
Status: DISCONTINUED | OUTPATIENT
Start: 2025-06-18 | End: 2025-06-19 | Stop reason: HOSPADM

## 2025-06-18 RX ADMIN — LEVOTHYROXINE SODIUM 112 MCG: 0.11 TABLET ORAL at 06:08

## 2025-06-18 RX ADMIN — ASPIRIN 81 MG: 81 TABLET, CHEWABLE ORAL at 09:15

## 2025-06-18 RX ADMIN — DILTIAZEM HYDROCHLORIDE 60 MG: 30 TABLET, FILM COATED ORAL at 06:08

## 2025-06-18 RX ADMIN — DILTIAZEM HYDROCHLORIDE 240 MG: 120 CAPSULE, EXTENDED RELEASE ORAL at 16:05

## 2025-06-18 RX ADMIN — ATORVASTATIN CALCIUM 40 MG: 40 TABLET, FILM COATED ORAL at 20:25

## 2025-06-18 RX ADMIN — LISINOPRIL 40 MG: 20 TABLET ORAL at 09:15

## 2025-06-18 RX ADMIN — SODIUM CHLORIDE, PRESERVATIVE FREE 10 ML: 5 INJECTION INTRAVENOUS at 09:16

## 2025-06-18 RX ADMIN — POTASSIUM CHLORIDE 10 MEQ: 750 TABLET, EXTENDED RELEASE ORAL at 09:15

## 2025-06-18 RX ADMIN — APIXABAN 5 MG: 5 TABLET, FILM COATED ORAL at 09:15

## 2025-06-18 RX ADMIN — SENNOSIDES 8.6 MG: 8.6 TABLET, FILM COATED ORAL at 10:03

## 2025-06-18 RX ADMIN — HYDRALAZINE HYDROCHLORIDE 10 MG: 20 INJECTION INTRAMUSCULAR; INTRAVENOUS at 12:25

## 2025-06-18 RX ADMIN — SODIUM CHLORIDE, PRESERVATIVE FREE 10 ML: 5 INJECTION INTRAVENOUS at 20:25

## 2025-06-18 RX ADMIN — HYDROCHLOROTHIAZIDE 25 MG: 25 TABLET ORAL at 09:15

## 2025-06-18 RX ADMIN — HYDRALAZINE HYDROCHLORIDE 50 MG: 50 TABLET ORAL at 14:53

## 2025-06-18 RX ADMIN — SENNOSIDES 8.6 MG: 8.6 TABLET, FILM COATED ORAL at 20:25

## 2025-06-18 RX ADMIN — DILTIAZEM HYDROCHLORIDE 60 MG: 30 TABLET, FILM COATED ORAL at 11:45

## 2025-06-18 RX ADMIN — HYDRALAZINE HYDROCHLORIDE 50 MG: 50 TABLET ORAL at 20:25

## 2025-06-18 RX ADMIN — DOCUSATE SODIUM 100 MG: 100 CAPSULE, LIQUID FILLED ORAL at 09:15

## 2025-06-18 RX ADMIN — APIXABAN 5 MG: 5 TABLET, FILM COATED ORAL at 20:25

## 2025-06-18 NOTE — PROGRESS NOTES
Spiritual Health History and Assessment/Progress Note  [facility] [place]    (P) Initial Encounter,  ,  ,      Name: Jaida Virk MRN: [id number]    Age: 79 y.o.     Sex: female   Language: English   Holiness: Zoroastrian   Symptomatic bradycardia     Date: 6/18/2025                           Spiritual Assessment [place] in [facility]        Referral/Consult From: (P) Rounding   Encounter Overview/Reason: (P) Initial Encounter  Service Provided For: (P) Patient    Concepcion, Belief, Meaning:   Patient identifies as spiritual and is connected with a concepcion tradition or spiritual practice  Family/Friends No family/friends present      Importance and Influence:  Patient has spiritual/personal beliefs that influence decisions regarding their health  Family/Friends No family/friends present    Community:  Patient is connected with a spiritual community and feels well-supported. Support system includes: Extended family  Family/Friends No family/friends present    Assessment and Plan of Care:     Patient Interventions include: Facilitated expression of thoughts and feelings, Explored spiritual coping/struggle/distress, Affirmed coping skills/support systems, and Provided sacramental/Episcopalian ritual  Family/Friends Interventions include: No family/friends present    Patient Plan of Care: Spiritual Care available upon further referral  Family/Friends Plan of Care: No family/friends present    Electronically signed by MEGHAN TSAI on 6/18/2025 at 3:51 PM

## 2025-06-18 NOTE — PROGRESS NOTES
[facility] PHYSICIANS- The Heart and Vascular [place] Electrophysiology  Inpatient progress note  PATIENT: Jaida Virk  MEDICAL RECORD NUMBER: [id number]  DATE OF SERVICE:  6/18/2025  ATTENDING ELECTROPHYSIOLOGIST: Meagan Garcia MD  PRIMARY ELECTROPHYSIOLOGIST: Meagan Garcia MD  REFERRING PHYSICIAN: No ref. provider found and Ricardo Farooq DO  CHIEF COMPLAINT: lightheadedness, dizzy    HPI: This is a 79 y.o. female with a history of HTN, HLD, hypothyroidism, paroxysmal atrial fibrillation and Vitamin D deficiency. She is unknown to [facility] group. Maintained on ASA, Lipitor, Lotensin,  Diltiazem, HCTZ, Synthroid by her PMD.  She says she has seen Dr. Frye from cardiology in the past but no office notes were available for review    Patient presented to [facility] with complaints of palpitations and lightheadedness for about 2 weeks.  HR was noted to be 32-34. 1mg of atropine was given by EMS which brought her HR up to 46bpm. She has had lightheadedness intermittently for the past several weeks but states this usually resolves on its own. She reports she had lightheadedness today that did not resolve. She reports being lightheaded with ambulation and she states she'll intermittently feel her heart \"beating on.\"  No episodes of syncope.  No other cardiac symptoms of chest pain or shortness of breath with exertion.  She is able to ambulate without difficulty.  On presentation the patient was in a junctional rhythm at about 40 bpm as mentioned above but diltiazem was stopped and her heart rate improved to 90 bpm-sinus rhythm.  However about 5 PM this evening she developed atrial fibrillation with rapid ventricular rate-120 to 140 bpm.  No symptoms associated with recurrence of atrial fibrillation   Cardiac electrophysiology service is consulted for bradycardia, paroxysmal atrial fibrillation    6/18/25: Patient feels better and able to ambulate without difficulty.  No episodes of bradycardia

## 2025-06-18 NOTE — PLAN OF CARE
Problem: Discharge Planning  Goal: Discharge to home or other facility with appropriate resources  Outcome: Progressing  Flowsheets  Taken 6/18/2025 0756 by Eva Ny RN  Discharge to home or other facility with appropriate resources: Identify barriers to discharge with patient and caregiver  Taken 6/17/2025 2010 by Ratna Escobar RN  Discharge to home or other facility with appropriate resources:   Identify barriers to discharge with patient and caregiver   Identify discharge learning needs (meds, wound care, etc)     Problem: Safety - Adult  Goal: Free from fall injury  6/18/2025 0948 by Eva Ny RN  Outcome: Progressing  6/17/2025 2106 by Ratna Escobar RN  Outcome: Progressing     Problem: ABCDS Injury Assessment  Goal: Absence of physical injury  6/18/2025 0948 by Eva Ny RN  Outcome: Progressing  6/17/2025 2106 by Ratna Escobar RN  Outcome: Progressing     Problem: Cardiovascular - Adult  Goal: Maintains optimal cardiac output and hemodynamic stability  6/18/2025 0948 by Eva Ny RN  Outcome: Progressing  Flowsheets (Taken 6/18/2025 0756)  Maintains optimal cardiac output and hemodynamic stability: Monitor blood pressure and heart rate  6/17/2025 2106 by Ratna Escobar RN  Outcome: Progressing  Goal: Absence of cardiac dysrhythmias or at baseline  Outcome: Progressing  Flowsheets (Taken 6/18/2025 0756)  Absence of cardiac dysrhythmias or at baseline: Monitor cardiac rate and rhythm

## 2025-06-18 NOTE — PLAN OF CARE
Problem: Safety - Adult  Goal: Free from fall injury  6/17/2025 2106 by Ratna Escobar RN  Outcome: Progressing  6/17/2025 1351 by Eva Ny RN  Outcome: Progressing     Problem: ABCDS Injury Assessment  Goal: Absence of physical injury  6/17/2025 2106 by Ratna Escobar RN  Outcome: Progressing  6/17/2025 1351 by Eva Ny RN  Outcome: Progressing     Problem: Cardiovascular - Adult  Goal: Maintains optimal cardiac output and hemodynamic stability  6/17/2025 2106 by Ratna Escobar RN  Outcome: Progressing  6/17/2025 1351 by Eva Ny RN  Outcome: Progressing  Goal: Absence of cardiac dysrhythmias or at baseline  6/17/2025 1351 by Eva Ny RN  Outcome: Progressing

## 2025-06-18 NOTE — DISCHARGE SUMMARY
Hospitalist Discharge Summary    Patient ID: Jaida Virk   Patient : 1945  Patient's PCP: Ricardo Farooq DO    Admit Date: 2025   Admitting Physician: Ashley Weaver DO    Discharge Date:  2025   Discharge Physician: Leanne Alanis MD   Discharge Condition: Stable  Discharge Disposition: Home      Hospital course in brief:  (Please refer to daily progress notes for a comprehensive review of the hospitalization by requesting medical records)    79-year-old lady with past medical history of hypertension, hyperlipidemia, hypothyroidism presented to the hospital with chief complaint of palpitation, lightheadedness.  She was found to be bradycardic at emergency department and received atropine  Upon presentation to the ER, routine labwork was performed which revealed glucose 132, ALT 52, AST 51   Blood pressure 130/77, heart rate 45  Patient admitted under internal medicine service to the EP consulted  Per EP the bradycardia is likely due to SA node dysfunction and high dose diltiazem use; recommended to decrease the dose to 240 mg daily; given decreased cardizem dose BP in am was high; added hydralazine    Heart rate around 80s  Bp  is 126/67  Pt has constipation; added mutiple bowel regimen and enema; she had h/o stercoral ulcer from constipation previously; ok to discharge once pt has good bowel movement    Follow up with EP in a week  Zio upon dc  Consults:   IP CONSULT TO ELECTROPHYSIOLOGY  IP CONSULT TO HOSPITALIST    Discharge Diagnoses:    Symptomatic bradycardia    Discharge Instructions / Follow up:  Follow-up with PCP within 1 week of discharge.  Follow-up with consultants as indicated by them.  Compliance with medications as prescribed on discharge.    Future Appointments   Date Time Provider Department Center   2025  1:10 PM Ricardo Farooq DO [facility] ECC DEP       The patient's condition is stable.  At this time the patient is without objective

## 2025-06-18 NOTE — CARE COORDINATION
Transition of Care: Met with Patient at bedside and explained case management role. Patient lives in an apartment with elevator access. Patient ambulated without assistive device PTA. Patient primary care physician is Ricardo Farooq DO. [facility]. Primary decision maker is Ariane (Brother). Discharge plan is home with no home healthcare needs. Friend to transport. Must discharge before dark because her friends do not drive after dark. Patient came to hospital with lightheadedness and palpitations. Found to be Bradycardic. EP consulted. Medications being adjusted. CM/SW to follow. MT    Case Management Assessment  Initial Evaluation    Date/Time of Evaluation: 6/18/2025 11:45 AM  Assessment Completed by: Fran Zimmerman RN    If patient is discharged prior to next notation, then this note serves as note for discharge by case management.    Patient Name: Jaida Virk                   YOB: 1945  Diagnosis: Junctional bradycardia [R00.1]  Symptomatic bradycardia [R00.1]                   Date / Time: 6/16/2025  4:07 PM    Patient Admission Status: Inpatient   Readmission Risk (Low < 19, Mod (19-27), High > 27): Readmission Risk Score: 8.9    Current PCP: Ricardo Farooq DO  PCP verified by CM? Yes    Chart Reviewed: Yes      History Provided by: Patient  Patient Orientation: Alert and Oriented    Patient Cognition: Alert    Hospitalization in the last 30 days (Readmission):  No    If yes, Readmission Assessment in  Navigator will be completed.    Advance Directives:      Code Status: Full Code   Patient's Primary Decision Maker is: Legal Next of Kin    Primary Decision Maker: KotaAriane - Brother/Sister - [phone]    Discharge Planning:    Patient lives with: Alone Type of Home: Apartment  Primary Care Giver: Self  Patient Support Systems include: Family Members   Current Financial resources:    Current community resources:    Current services

## 2025-06-18 NOTE — PROGRESS NOTES
CLINICAL PHARMACY NOTE: MEDS TO BEDS    Total # of Prescriptions Filled: 3   The following medications were delivered to the patient:  Eliquis 5mg  Diltiazem er 240mg  Hydralazine 50mg    Additional Documentation:  Delivered to patient 6-18-25

## 2025-06-18 NOTE — FLOWSHEET NOTE
06/18/25 1209   Vitals   Orthostatic B/P and Pulse? Yes   Blood Pressure Lying 187/81   Pulse Lying 101 PER MINUTE   Blood Pressure Sitting 185/87   Pulse Sitting 106 PER MINUTE   Blood Pressure Standing 181/88   Pulse Standing 117 PER MINUTE     Patient walked 215ft from room to nurses station and back. Tolerated well with no complaints of dizziness.    1605 Orthostatic BP and Pulse:  Lying: /54, HR 99  Sitting: /67,   Standing: /60,

## 2025-06-19 ENCOUNTER — APPOINTMENT (OUTPATIENT)
Dept: CT IMAGING | Age: 80
DRG: 310 | End: 2025-06-19
Attending: STUDENT IN AN ORGANIZED HEALTH CARE EDUCATION/TRAINING PROGRAM
Payer: MEDICARE

## 2025-06-19 VITALS
SYSTOLIC BLOOD PRESSURE: 126 MMHG | WEIGHT: 162.7 LBS | DIASTOLIC BLOOD PRESSURE: 67 MMHG | HEART RATE: 85 BPM | RESPIRATION RATE: 18 BRPM | OXYGEN SATURATION: 95 % | TEMPERATURE: 97.9 F | BODY MASS INDEX: 29.94 KG/M2 | HEIGHT: 62 IN

## 2025-06-19 LAB
ANION GAP SERPL CALCULATED.3IONS-SCNC: 15 MMOL/L (ref 7–16)
BUN SERPL-MCNC: 14 MG/DL (ref 8–23)
CALCIUM SERPL-MCNC: 9.1 MG/DL (ref 8.8–10.2)
CHLORIDE SERPL-SCNC: 104 MMOL/L (ref 98–107)
CO2 SERPL-SCNC: 21 MMOL/L (ref 22–29)
CREAT SERPL-MCNC: 0.8 MG/DL (ref 0.5–1)
GFR, ESTIMATED: 79 ML/MIN/1.73M2
GLUCOSE SERPL-MCNC: 114 MG/DL (ref 74–99)
MAGNESIUM SERPL-MCNC: 1.8 MG/DL (ref 1.6–2.4)
POTASSIUM SERPL-SCNC: 3.3 MMOL/L (ref 3.5–5.1)
SODIUM SERPL-SCNC: 140 MMOL/L (ref 136–145)

## 2025-06-19 PROCEDURE — 6370000000 HC RX 637 (ALT 250 FOR IP): Performed by: STUDENT IN AN ORGANIZED HEALTH CARE EDUCATION/TRAINING PROGRAM

## 2025-06-19 PROCEDURE — 2500000003 HC RX 250 WO HCPCS: Performed by: INTERNAL MEDICINE

## 2025-06-19 PROCEDURE — 6370000000 HC RX 637 (ALT 250 FOR IP): Performed by: INTERNAL MEDICINE

## 2025-06-19 PROCEDURE — 74176 CT ABD & PELVIS W/O CONTRAST: CPT

## 2025-06-19 PROCEDURE — 80048 BASIC METABOLIC PNL TOTAL CA: CPT

## 2025-06-19 PROCEDURE — 93246 EXT ECG>7D<15D RECORDING: CPT

## 2025-06-19 PROCEDURE — 36415 COLL VENOUS BLD VENIPUNCTURE: CPT

## 2025-06-19 PROCEDURE — 83735 ASSAY OF MAGNESIUM: CPT

## 2025-06-19 RX ORDER — PSEUDOEPHEDRINE HCL 30 MG
100 TABLET ORAL 2 TIMES DAILY
Qty: 60 CAPSULE | Refills: 0 | Status: SHIPPED | OUTPATIENT
Start: 2025-06-19

## 2025-06-19 RX ORDER — LACTULOSE 10 G/15ML
20 SOLUTION ORAL
Status: DISCONTINUED | OUTPATIENT
Start: 2025-06-19 | End: 2025-06-19 | Stop reason: HOSPADM

## 2025-06-19 RX ORDER — BISACODYL 10 MG
10 SUPPOSITORY, RECTAL RECTAL ONCE
Status: COMPLETED | OUTPATIENT
Start: 2025-06-19 | End: 2025-06-19

## 2025-06-19 RX ORDER — DOCUSATE SODIUM 100 MG/1
100 CAPSULE, LIQUID FILLED ORAL 2 TIMES DAILY
Status: DISCONTINUED | OUTPATIENT
Start: 2025-06-19 | End: 2025-06-19 | Stop reason: HOSPADM

## 2025-06-19 RX ORDER — LACTULOSE 10 G/15ML
20 SOLUTION ORAL 2 TIMES DAILY PRN
Qty: 30 EACH | Refills: 0 | Status: SHIPPED | OUTPATIENT
Start: 2025-06-19

## 2025-06-19 RX ADMIN — HYDRALAZINE HYDROCHLORIDE 50 MG: 50 TABLET ORAL at 04:33

## 2025-06-19 RX ADMIN — ASPIRIN 81 MG: 81 TABLET, CHEWABLE ORAL at 09:07

## 2025-06-19 RX ADMIN — HYDROCHLOROTHIAZIDE 25 MG: 25 TABLET ORAL at 09:07

## 2025-06-19 RX ADMIN — DOCUSATE SODIUM 100 MG: 100 CAPSULE, LIQUID FILLED ORAL at 09:07

## 2025-06-19 RX ADMIN — BISACODYL 10 MG: 10 SUPPOSITORY RECTAL at 09:07

## 2025-06-19 RX ADMIN — LISINOPRIL 40 MG: 20 TABLET ORAL at 09:07

## 2025-06-19 RX ADMIN — SENNOSIDES 8.6 MG: 8.6 TABLET, FILM COATED ORAL at 09:08

## 2025-06-19 RX ADMIN — APIXABAN 5 MG: 5 TABLET, FILM COATED ORAL at 09:07

## 2025-06-19 RX ADMIN — LEVOTHYROXINE SODIUM 112 MCG: 0.11 TABLET ORAL at 04:35

## 2025-06-19 RX ADMIN — DILTIAZEM HYDROCHLORIDE 240 MG: 120 CAPSULE, EXTENDED RELEASE ORAL at 07:35

## 2025-06-19 RX ADMIN — POLYETHYLENE GLYCOL 3350 17 G: 17 POWDER, FOR SOLUTION ORAL at 04:33

## 2025-06-19 RX ADMIN — DOCUSATE SODIUM 283 MG: 283 LIQUID RECTAL at 14:03

## 2025-06-19 RX ADMIN — LACTULOSE 20 G: 20 SOLUTION ORAL at 09:08

## 2025-06-19 RX ADMIN — SODIUM CHLORIDE, PRESERVATIVE FREE 10 ML: 5 INJECTION INTRAVENOUS at 09:08

## 2025-06-19 RX ADMIN — POTASSIUM CHLORIDE 10 MEQ: 750 TABLET, EXTENDED RELEASE ORAL at 09:07

## 2025-06-19 NOTE — PROGRESS NOTES
Okay to discharge from EP standpoint, per Dr. Garcia. Electronically signed by Ayleen Hook RN on 6/19/2025 at 2:21 PM     English

## 2025-06-19 NOTE — PROGRESS NOTES
OCCUPATIONAL THERAPY INITIAL EVALUATION    [facility]  [address] [place], [place]      Date:2025                                                  Patient Name: Jaida Virk  MRN: [id number]  : 1945  Room: [address] [place] [address]    Evaluating OT: ANGELINA Chatman, OTR/L  # [id number]    Referring Provider:  Leanne Alanis MD   Specific Provider Orders:  \"OT Eval and Treat\"  25    Diagnosis: Junctional bradycardia [R00.1]  Symptomatic bradycardia [R00.1]    Pt was admitted w/ palpitations, lightheadedness w/ ambulation    Pertinent Medical History:  Pt has a past medical history of Atrial fibrillation (HCC), Gastritis with hemorrhage, Hyperlipidemia, Hypertension, Hypothyroidism, Obesity (BMI 30.0-34.9), Paroxysmal atrial fibrillation (HCC), and Traumatic arthritis of right ankle.,  has a past surgical history that includes Carpal tunnel release (); Ankle surgery (2001); Hemorrhoid surgery (); Upper gastrointestinal endoscopy (2008); Colonoscopy (2008); Colonoscopy (13); Septoplasty (2013); eye surgery; Myringotomy Tympanostomy Tube Placement (Right, 2015); Hemorrhoid surgery (6/1/15); fracture surgery (Right, 2001); Dental surgery; skin biopsy (2016); and Myringotomy Tympanostomy Tube Placement (Right, 2019).    Surgeries this admission: None     Precautions:  Fall Risk  Cognition - limited safety awareness    Assessment of current deficits   [x] Functional mobility                [x]ADLs  [x] Strength                 [x]Cognition   [x] Functional transfers              [x] IADLs         [x] Safety Awareness   [x]Endurance   [] Fine Coordination                 [x] Balance               [] Vision/perception     []Sensation    []Gross Motor Coordination     [] ROM  [] Delirium                   [] Motor Control       OT PLAN OF CARE   OT POC based on physician orders, patient diagnosis and  medical information, gathering information on past medical history/social history and prior level of function, completion of standardized testing/informal observation of tasks, assessment of data and education on plan of care and goals.            ANGELINA Chatman, OTR/L  # [id number]

## 2025-06-19 NOTE — PROGRESS NOTES
Applied Zio XT monitor for 14 days per physician order.  Package Serial #[id number].   Instructed patient to:  avoid showering in the first 24 hours.   Press the button on the monitor when you feel a symptom and write it in your diary.   Do not submerge in water.   No lotion or powder near the patch.   Please return the monitor in the prepaid box/envelope provided w/device.     Patient verbalized understanding.     Registered by Ayleen Hook RN

## 2025-06-19 NOTE — PROGRESS NOTES
CLINICAL PHARMACY NOTE: MEDS TO BEDS    Total # of Prescriptions Filled: 2   The following medications were delivered to the patient:  Enulose 10 gm/15ml soln  Docusate 100 mg    Additional Documentation:   Delivered to pt

## 2025-06-19 NOTE — PROGRESS NOTES
Message out to EP regarding discharge. Electronically signed by Ayleen Hook RN on 6/19/2025 at 11:11 AM

## 2025-06-19 NOTE — CARE COORDINATION
CM Update: Discharge Order Noted: Met with patient at bedside to discuss transition of care plan. Discharge plan is home with no home healthcare needs. Friend to transport. Must discharge before dark because her friends do not drive after dark. Needs: Zio at discharge. CM/NOE to follow. Fran Zimmerman [phone]

## 2025-06-19 NOTE — PLAN OF CARE
Problem: Discharge Planning  Goal: Discharge to home or other facility with appropriate resources  6/18/2025 2028 by Ratna Escobar, RN  Outcome: Progressing  6/18/2025 0948 by Eva Ny, RN  Outcome: Progressing  Flowsheets  Taken 6/18/2025 0756 by Eva Ny, RN  Discharge to home or other facility with appropriate resources: Identify barriers to discharge with patient and caregiver  Taken 6/17/2025 2010 by Ratna Escobar, RN  Discharge to home or other facility with appropriate resources:   Identify barriers to discharge with patient and caregiver   Identify discharge learning needs (meds, wound care, etc)

## 2025-06-19 NOTE — PLAN OF CARE
Problem: Discharge Planning  Goal: Discharge to home or other facility with appropriate resources  Outcome: Progressing     Problem: Safety - Adult  Goal: Free from fall injury  Outcome: Progressing     Problem: ABCDS Injury Assessment  Goal: Absence of physical injury  Outcome: Progressing     Problem: Cardiovascular - Adult  Goal: Maintains optimal cardiac output and hemodynamic stability  Outcome: Progressing  Goal: Absence of cardiac dysrhythmias or at baseline  Outcome: Progressing

## 2025-06-19 NOTE — PROGRESS NOTES
Physical Therapy    PT order received and medical chart reviewed 6/19. Pt adamantly declined PT evaluation due to constipation/not feeling well. Benefits of OOB activity were reviewed but pt continued to decline. Thank you.    Balaji Avendano, PT, DPT  [id number]

## 2025-06-19 NOTE — PROGRESS NOTES
Hospitalist Progress Note      SYNOPSIS: Patient admitted on 2025 for Symptomatic bradycardia  79-year-old lady with past medical history of hypertension, hyperlipidemia, hypothyroidism presented to the hospital with chief complaint of palpitation, lightheadedness.  She was found to be bradycardic at emergency department and received atropine  Upon presentation to the ER, routine labwork was performed which revealed glucose 132, ALT 52, AST 51   Blood pressure 130/77, heart rate 45  Patient admitted under internal medicine service to the EP consulted    -pt's heart rate in 80s overnight; hypertensive in am; added hydralazine 50 mg tid; EP recommended diltiazem 240 mg daily   Can dc tomorrow am if heart rate is stable per EP    SUBJECTIVE:  Stable overnight. No other overnight issues reported.   Patient seen and examined at bedside today and patient denies any chest pain, shortness of breath.  Patient wants to eat something, she feels she is thirsty.  Records reviewed.           Temp (24hrs), Av.8 °F (36.6 °C), Min:97.4 °F (36.3 °C), Max:98 °F (36.7 °C)    DIET: ADULT DIET; Regular  CODE: Full Code    Intake/Output Summary (Last 24 hours) at 2025 0747  Last data filed at 2025 1027  Gross per 24 hour   Intake 240 ml   Output --   Net 240 ml       Review of Systems  All bolded are positive; please see HPI  General:  Fever, chills, diaphoresis, fatigue, malaise, night sweats, weight loss  Psychological:  Anxiety, disorientation, hallucinations.  ENT:  Epistaxis, headaches, vertigo, visual changes.  Cardiovascular:  Chest pain, irregular heartbeats, palpitations, paroxysmal nocturnal dyspnea.  Respiratory:  Shortness of breath, coughing, sputum production, hemoptysis, wheezing, orthopnea.  Gastrointestinal:  Nausea, vomiting, diarrhea, heartburn, constipation, abdominal pain, hematemesis, hematochezia, melena, acholic stools  Genito-Urinary:  Dysuria, urgency, frequency,

## 2025-06-20 ENCOUNTER — CARE COORDINATION (OUTPATIENT)
Dept: CARE COORDINATION | Age: 80
End: 2025-06-20

## 2025-06-20 DIAGNOSIS — R00.1 SYMPTOMATIC BRADYCARDIA: Primary | ICD-10-CM

## 2025-06-20 PROCEDURE — 1111F DSCHRG MED/CURRENT MED MERGE: CPT | Performed by: STUDENT IN AN ORGANIZED HEALTH CARE EDUCATION/TRAINING PROGRAM

## 2025-06-20 NOTE — CARE COORDINATION
Care Transitions Note    Initial Call - Call within 2 business days of discharge: Yes    Patient Current Location:  Home: [address]  [address]  [place] [id number]    Care Transition Nurse contacted the patient by telephone to perform post hospital discharge assessment, verified name and  as identifiers.  Provided introduction to self, and explanation of the Care Transition Nurse role.    Patient: Jaida Virk    Patient : 1945   MRN: [id number]    Reason for Admission: Junctional bradycardia  Discharge Date: 25  RURS: Readmission Risk Score: 9.8      Last Discharge Facility       Date Complaint Diagnosis Description Type Department Provider    25 Palpitations Junctional bradycardia ... ED to Hosp-Admission (Discharged) (ADMITTED) [facility] Leanne Alanis MD; [facility]...            Was this an external facility discharge? No    Additional needs identified to be addressed with provider   No needs identified             Method of communication with provider: none.    Patients top risk factors for readmission: lack of knowledge about disease, medical condition-constipation, A-fib, junctional bradycardia, hypothyroidism, Htn, medication management, multiple health system providers, and polypharmacy    Interventions to address risk factors:   Education: Reinforce education regarding s/sx recurrent A-fib or bradycardia and when to contact EP with symptoms or to return to ED with severe symptoms  HFU appt with pcp~No available appts to offer the pt. Pt states that her pcp is on maternity leave and not returning until next month. CTN will route a high priority message to [facility] and request office staff outreach to the pt and offer a 7d HFU appointment.  F/u with Dr. Garcia as advised~Pt states that she will call to schedule  Encouraged daily monitoring of BP/HRs~Pt routinely checks at night  Complete 14 day Zio monitoring~Pt confirms that she is currently wearing.  Notify PCP if

## 2025-06-23 ENCOUNTER — TELEPHONE (OUTPATIENT)
Dept: FAMILY MEDICINE CLINIC | Age: 80
End: 2025-06-23

## 2025-06-23 NOTE — TELEPHONE ENCOUNTER
Care Transitions Initial Follow Up Call    Outreach made within 2 business days of discharge: Yes    Patient: Jaida Virk Patient : 1945   MRN: [id number]  Reason for Admission: 25  Discharge Date: 25       Spoke with: Jaida    Discharge department/facility: [facility] Interactive Patient Contact:  Was patient able to fill all prescriptions: Yes  Was patient instructed to bring all medications to the follow-up visit: Yes  Is patient taking all medications as directed in the discharge summary? Yes  Does patient understand their discharge instructions: Yes  Does patient have questions or concerns that need addressed prior to 7-14 day follow up office visit: no    Additional needs identified to be addressed with provider  No needs identified             Scheduled appointment with PCP within 7-14 days    Follow Up  Future Appointments   Date Time Provider Department Center   2025 12:40 PM Juan Paulino MD [facility]   2025  1:10 PM Ricardo Farooq DO [facility]       Marta Tai MA

## 2025-06-25 ENCOUNTER — TELEPHONE (OUTPATIENT)
Dept: NON INVASIVE DIAGNOSTICS | Age: 80
End: 2025-06-25

## 2025-06-25 NOTE — TELEPHONE ENCOUNTER
Patient called on this day 6/25/25 to schedule appointment.   Wished to wait for Zio Monitor results before being scheduled.     Request 2 week notice prior to appointment to set up ride.

## 2025-06-27 ENCOUNTER — CARE COORDINATION (OUTPATIENT)
Dept: CARE COORDINATION | Age: 80
End: 2025-06-27

## 2025-06-27 NOTE — CARE COORDINATION
Care Transitions Note    Follow Up Call     Attempted to reach patient for transitions of care follow up.  Unable to reach patient.      Outreach Attempts:   1st attempt. HIPAA compliant voicemail left for patient.     Care Summary Note: Will attempt outreach again.    Follow Up Appointment:   Future Appointments         Provider Specialty Dept Phone    6/30/2025 12:40 PM Juan Paulino MD Family Medicine [phone]    8/20/2025 1:10 PM Ricardo Farooq DO Family Medicine [phone]            Plan for follow-up call in 6-10 days based on severity of symptoms and risk factors. Plan for next call: symptom management-Any recurrent s/sx bradycardia or A-fib? Constipation resolved?  self management-BP/HRs stable?  follow-up appointment-Review HFU appt with pcp from 6/30. F/u scheduled with Dr. Garcia?     Aracelis Kowalski RN

## 2025-06-30 ENCOUNTER — OFFICE VISIT (OUTPATIENT)
Dept: FAMILY MEDICINE CLINIC | Age: 80
End: 2025-06-30

## 2025-06-30 VITALS
HEART RATE: 100 BPM | HEIGHT: 62 IN | SYSTOLIC BLOOD PRESSURE: 168 MMHG | DIASTOLIC BLOOD PRESSURE: 80 MMHG | TEMPERATURE: 97.1 F | WEIGHT: 167.6 LBS | OXYGEN SATURATION: 97 % | BODY MASS INDEX: 30.84 KG/M2

## 2025-06-30 DIAGNOSIS — Z09 HOSPITAL DISCHARGE FOLLOW-UP: ICD-10-CM

## 2025-06-30 DIAGNOSIS — E87.6 HYPOKALEMIA: ICD-10-CM

## 2025-06-30 DIAGNOSIS — I48.0 PAROXYSMAL ATRIAL FIBRILLATION (HCC): ICD-10-CM

## 2025-06-30 DIAGNOSIS — I10 ESSENTIAL HYPERTENSION: Primary | ICD-10-CM

## 2025-06-30 RX ORDER — HYDRALAZINE HYDROCHLORIDE 50 MG/1
50 TABLET, FILM COATED ORAL EVERY 8 HOURS SCHEDULED
Qty: 90 TABLET | Refills: 3 | Status: SHIPPED | OUTPATIENT
Start: 2025-06-30

## 2025-06-30 RX ORDER — DILTIAZEM HYDROCHLORIDE 240 MG/1
240 CAPSULE, COATED, EXTENDED RELEASE ORAL DAILY
Qty: 90 CAPSULE | Refills: 2 | Status: SHIPPED | OUTPATIENT
Start: 2025-06-30

## 2025-06-30 RX ORDER — POTASSIUM CHLORIDE 750 MG/1
TABLET, EXTENDED RELEASE ORAL
Qty: 90 TABLET | Refills: 1 | Status: SHIPPED | OUTPATIENT
Start: 2025-06-30 | End: 2026-07-01

## 2025-06-30 RX ORDER — HYDROCHLOROTHIAZIDE 25 MG/1
TABLET ORAL
Qty: 90 TABLET | Refills: 2 | Status: SHIPPED | OUTPATIENT
Start: 2025-06-30 | End: 2026-07-01

## 2025-06-30 RX ORDER — TRIAMCINOLONE ACETONIDE 1 MG/G
CREAM TOPICAL
COMMUNITY
Start: 2025-06-12

## 2025-06-30 NOTE — PROGRESS NOTES
Post-Discharge Transitional Care  Follow Up      Jaida Virk   YOB: 1945    Date of Office Visit:  6/30/2025  Date of Hospital Admission: 6/16/25  Date of Hospital Discharge: 6/19/25  Risk of hospital readmission (high >=14%. Medium >=10%) :Readmission Risk Score: 9.8      Care management risk score Rising risk (score 2-5) and Complex Care (Scores >=6): No Risk Score On File     Non face to face  following discharge, date last encounter closed (first attempt may have been earlier): 06/23/2025    Call initiated 2 business days of discharge: Yes      ASSESSMENT/PLAN:   1. Hypertension  - Blood pressure remains slightly elevated.  - Hydralazine 25 mg three times daily was previously prescribed; dosage increased to 50 mg three times daily.  - Follow-up appointment scheduled in 6 weeks to monitor blood pressure response.  - No new medications for blood pressure were started during the hospital visit.    2. Potassium deficiency  - Potassium levels were low during the last hospital visit.  - Prescription for potassium chloride ER 10 mEq provided.  - Medication sent to pharmacy.  - Potassium levels will be monitored.    3. Vitamin D deficiency  - Vitamin D levels were within the normal range during the last check.  - Advised to continue vitamin D supplementation.  - No side effects expected from continued use.  - Patient reports limited sun exposure.    4. A fib  - Cardizem dosage decreased to 240 mg.  - Continue Eliquis 5 mg twice daily  - No reported side effects from the dosage decrease.  - Medication sent to pharmacy.  - Patient will follow up with electrophysiologist after heart monitor review.       Medical Decision Making: moderate complexity  Return in about 6 weeks (around 8/11/2025) for HTN.           Subjective:   Inpatient course: Discharge summary reviewed- see chart.    History of Present Illness  The patient presents for evaluation of blood pressure management, potassium deficiency, and

## 2025-07-02 ENCOUNTER — CARE COORDINATION (OUTPATIENT)
Dept: CARE COORDINATION | Age: 80
End: 2025-07-02

## 2025-07-02 NOTE — CARE COORDINATION
Care Transitions Note    Follow Up Call     Patient Current Location:  Home: [address]  [address]  [place] [address]    Care Transition Nurse contacted the patient by telephone. Verified name and  as identifiers.    Additional needs identified to be addressed with provider   No needs identified                 Method of communication with provider: none.    Care Summary Note: CTN received a return call from the pt. This CTN spoke to the patient for a sub care transition call. Pt was difficult to engage in conversation with pt providing minimal feedback when prompted with questions.     Pt states that she is doing \"good\" and voiced no complaints. Pt recently completed a f/u with her pcp on 25. Reviewed OV noting pt's BP was elevated at 168/80 at the appt. Noted pcp increased the pt's Hydralazine from 25 mg TID to 50 mg TID. Pt confirms that she has increased as advised but states that her BP is always high at doctor's appts and never that high at home. CTN inquired what her BP readings have been since increasing her Hydralazine and pt stated, \"I don't know. I don't have my list right now.\" Pt recalls that typically at home her SBPs range around 110 but again didn't have specific readings.    The pt denies any CP, sob, palpitations, racing heartbeat, or lightheadedness/dizziness. Pt does no recall what her HRs range when checking her BP readings stating that she doesn't pay attention. Pt was encouraged to note both BP/HR readings when monitoring and write on log. To notify EP for any low/elevated HRs. Pt does state that she continues to wear her Zio monitor and it is due to be sent back this Friday.     The pt voiced no needs/concerns and was agreeable  to continued outreaches.     Plan of care updates since last contact:  PCP HFU completed on   Hydralazine dose increased as above    Advance Care Planning:   Does patient have an Advance Directive: reviewed during previous call, see note. .    Medication

## 2025-07-03 ENCOUNTER — APPOINTMENT (OUTPATIENT)
Dept: GENERAL RADIOLOGY | Age: 80
End: 2025-07-03
Payer: MEDICARE

## 2025-07-03 ENCOUNTER — HOSPITAL ENCOUNTER (EMERGENCY)
Age: 80
Discharge: HOME | End: 2025-07-03
Attending: EMERGENCY MEDICINE
Payer: MEDICARE

## 2025-07-03 ENCOUNTER — APPOINTMENT (OUTPATIENT)
Dept: CT IMAGING | Age: 80
End: 2025-07-03
Payer: MEDICARE

## 2025-07-03 ENCOUNTER — APPOINTMENT (OUTPATIENT)
Dept: MRI IMAGING | Age: 80
End: 2025-07-03
Payer: MEDICARE

## 2025-07-03 VITALS
HEART RATE: 97 BPM | WEIGHT: 160 LBS | OXYGEN SATURATION: 93 % | DIASTOLIC BLOOD PRESSURE: 85 MMHG | BODY MASS INDEX: 29.26 KG/M2 | SYSTOLIC BLOOD PRESSURE: 169 MMHG | TEMPERATURE: 98.6 F | RESPIRATION RATE: 27 BRPM

## 2025-07-03 DIAGNOSIS — R42 LIGHTHEADEDNESS: Primary | ICD-10-CM

## 2025-07-03 LAB
ALBUMIN SERPL-MCNC: 4.4 G/DL (ref 3.5–5.2)
ALP SERPL-CCNC: 75 U/L (ref 35–104)
ALT SERPL-CCNC: 32 U/L (ref 0–35)
ANION GAP SERPL CALCULATED.3IONS-SCNC: 16 MMOL/L (ref 7–16)
AST SERPL-CCNC: 37 U/L (ref 0–35)
BASOPHILS # BLD: 0.03 K/UL (ref 0–0.2)
BASOPHILS NFR BLD: 0 % (ref 0–2)
BILIRUB SERPL-MCNC: 0.4 MG/DL (ref 0–1.2)
BNP SERPL-MCNC: 211 PG/ML (ref 0–450)
BUN SERPL-MCNC: 14 MG/DL (ref 8–23)
CALCIUM SERPL-MCNC: 10.5 MG/DL (ref 8.8–10.2)
CHLORIDE SERPL-SCNC: 102 MMOL/L (ref 98–107)
CO2 SERPL-SCNC: 25 MMOL/L (ref 22–29)
CREAT SERPL-MCNC: 0.7 MG/DL (ref 0.5–1)
EOSINOPHIL # BLD: 0.07 K/UL (ref 0.05–0.5)
EOSINOPHILS RELATIVE PERCENT: 1 % (ref 0–6)
ERYTHROCYTE [DISTWIDTH] IN BLOOD BY AUTOMATED COUNT: 12.7 % (ref 11.5–15)
GFR, ESTIMATED: 82 ML/MIN/1.73M2
GLUCOSE SERPL-MCNC: 120 MG/DL (ref 74–99)
HCT VFR BLD AUTO: 43.8 % (ref 34–48)
HGB BLD-MCNC: 14.6 G/DL (ref 11.5–15.5)
IMM GRANULOCYTES # BLD AUTO: 0.03 K/UL (ref 0–0.58)
IMM GRANULOCYTES NFR BLD: 0 % (ref 0–5)
LYMPHOCYTES NFR BLD: 1.08 K/UL (ref 1.5–4)
LYMPHOCYTES RELATIVE PERCENT: 12 % (ref 20–42)
MAGNESIUM SERPL-MCNC: 1.9 MG/DL (ref 1.6–2.4)
MCH RBC QN AUTO: 31.4 PG (ref 26–35)
MCHC RBC AUTO-ENTMCNC: 33.3 G/DL (ref 32–34.5)
MCV RBC AUTO: 94.2 FL (ref 80–99.9)
MONOCYTES NFR BLD: 0.57 K/UL (ref 0.1–0.95)
MONOCYTES NFR BLD: 6 % (ref 2–12)
NEUTROPHILS NFR BLD: 81 % (ref 43–80)
NEUTS SEG NFR BLD: 7.44 K/UL (ref 1.8–7.3)
PLATELET # BLD AUTO: 198 K/UL (ref 130–450)
PMV BLD AUTO: 10.7 FL (ref 7–12)
POTASSIUM SERPL-SCNC: 3.5 MMOL/L (ref 3.5–5.1)
POTASSIUM SERPL-SCNC: 3.8 MMOL/L (ref 3.5–5.1)
POTASSIUM SERPL-SCNC: 3.8 MMOL/L (ref 3.5–5.1)
PROT SERPL-MCNC: 7.4 G/DL (ref 6.4–8.3)
RBC # BLD AUTO: 4.65 M/UL (ref 3.5–5.5)
SODIUM SERPL-SCNC: 143 MMOL/L (ref 136–145)
TROPONIN I SERPL HS-MCNC: 15 NG/L (ref 0–14)
TROPONIN I SERPL HS-MCNC: 17 NG/L (ref 0–14)
WBC OTHER # BLD: 9.2 K/UL (ref 4.5–11.5)

## 2025-07-03 PROCEDURE — 71045 X-RAY EXAM CHEST 1 VIEW: CPT

## 2025-07-03 PROCEDURE — 99285 EMERGENCY DEPT VISIT HI MDM: CPT

## 2025-07-03 PROCEDURE — 85025 COMPLETE CBC W/AUTO DIFF WBC: CPT

## 2025-07-03 PROCEDURE — 93005 ELECTROCARDIOGRAM TRACING: CPT

## 2025-07-03 PROCEDURE — 70450 CT HEAD/BRAIN W/O DYE: CPT

## 2025-07-03 PROCEDURE — 70551 MRI BRAIN STEM W/O DYE: CPT

## 2025-07-03 PROCEDURE — 83735 ASSAY OF MAGNESIUM: CPT

## 2025-07-03 PROCEDURE — 84484 ASSAY OF TROPONIN QUANT: CPT

## 2025-07-03 PROCEDURE — 80053 COMPREHEN METABOLIC PANEL: CPT

## 2025-07-03 PROCEDURE — 84132 ASSAY OF SERUM POTASSIUM: CPT

## 2025-07-03 PROCEDURE — 83880 ASSAY OF NATRIURETIC PEPTIDE: CPT

## 2025-07-03 ASSESSMENT — PAIN - FUNCTIONAL ASSESSMENT: PAIN_FUNCTIONAL_ASSESSMENT: NONE - DENIES PAIN

## 2025-07-03 NOTE — ED NOTES
Patient given ZIO patch in red bag. Patient acknowledges she has instructions for its return at home.

## 2025-07-03 NOTE — ED NOTES
Ok per ED attending  per ED resident ok to remove ZIO patch for MRI as the patch was due to be removed today- to be sent back tomorrow. Patch removed and labeled - will return to pt following MRI.

## 2025-07-03 NOTE — ED PROVIDER NOTES
Patient said to take off Zio patch tomorrow morning, comfortable with taking off now for MRI.  Patient was given Zio patch and a bag to be taken to her electrophysiologist.  MRI shows the followin. No acute intracranial abnormality.  2. Mild-to-moderate cerebral volume loss and chronic microvascular ischemic changes.  3. Enlarged perivascular spaces along the inferior aspects of the basal ganglia, along the anterior commissures bilaterally. They account for the hypodensity seen on the prior CT.   4. Moderate-to-large right mastoid effusion.    Patient has no mastoid tenderness or signs of mastoiditis.  Patient was updated on MRI results, she was ambulated with pulse ox, tolerated well, she was understanding and agreeable with plan for discharge home with close outpatient follow-up.  Patient was discharged home in stable condition and given strict return precautions.  Patient was encouraged to follow-up with electrophysiologist, and to bring her Zio patch for further evaluation.  Patient was given strict return precautions, patient was stable for discharge.      ED Course as of 25   Thu 2025   1511 Patient presents with lightheadedness and shortness of breath.  On arrival she is well-appearing and in no acute distress.  No chest pain.  No fever or cough.  She does note some recent bilateral leg swelling.  No known history of lung disease or CHF.  No associated abdominal pain, nausea, vomiting.  Physical exam reassuring except for trace bilateral pedal edema.  She is hypertensive on arrival.  Plan to obtain lab and imaging workup and will treat and disposition accordingly based on results. [SS]      ED Course User Index  [SS] Ronny Francis MD       CONSULTS: (Who and What was discussed)  None          FINAL IMPRESSION      1. Lightheadedness          DISPOSITION/PLAN     DISPOSITION Decision To Discharge 2025 08:33:28 PM    DISPOSITION  Disposition: Discharge to home  Patient condition

## 2025-07-04 NOTE — DISCHARGE INSTRUCTIONS
Please follow-up with primary care physician, return to the emergency department for any worsening symptoms or concerns including chest pain, shortness of breath, lightheadedness, or any other general concerns.    MRI BRAIN WO CONTRAST   Final Result   1. No acute intracranial abnormality.   2. Mild-to-moderate cerebral volume loss and chronic microvascular ischemic changes.   3. Enlarged perivascular spaces along the inferior aspects of the basal ganglia, along the anterior commissures bilaterally. They account for the hypodensity seen on the prior CT.    4. Moderate-to-large right mastoid effusion.            XR CHEST PORTABLE   Final Result   Suspect mild pulmonary vascular congestion.         CT Head W/O Contrast   Final Result   1. Subtle foci of hypoattenuation are associated with left internal capsule,   left basal ganglia, and left subinsular white matter which could indicate   areas of chronic microvascular ischemia or subacute stroke. MRI brain may be   helpful for further evaluation.   2. No acute intracranial hemorrhage.

## 2025-07-04 NOTE — ED NOTES
Patient ambulated independently wit no assistive devices. Patient ambulated approximately 200m. Patient pulse ox remained above 93% while ambulating.

## 2025-07-05 LAB
EKG ATRIAL RATE: 98 BPM
EKG P AXIS: 27 DEGREES
EKG P-R INTERVAL: 172 MS
EKG Q-T INTERVAL: 350 MS
EKG QRS DURATION: 86 MS
EKG QTC CALCULATION (BAZETT): 446 MS
EKG R AXIS: -10 DEGREES
EKG T AXIS: 34 DEGREES
EKG VENTRICULAR RATE: 98 BPM

## 2025-07-05 PROCEDURE — 93010 ELECTROCARDIOGRAM REPORT: CPT | Performed by: INTERNAL MEDICINE

## 2025-07-07 ENCOUNTER — CARE COORDINATION (OUTPATIENT)
Dept: CARE COORDINATION | Age: 80
End: 2025-07-07

## 2025-07-07 NOTE — CARE COORDINATION
Care Transitions Note    Follow Up Call     Attempted to reach patient for transitions of care follow up.  Unable to reach patient.      Outreach Attempts:   HIPAA compliant voicemail left for patient.     Care Summary Note: Will attempt outreach again.    Follow Up Appointment:   Future Appointments         Provider Specialty Dept Phone    8/14/2025 1:10 PM Ricardo Farooq DO Family Medicine [phone]    8/20/2025 1:10 PM Ricardo Farooq DO Family Medicine [phone]            Plan for follow-up on next business day.  based on severity of symptoms and risk factors. Plan for next call: ED f/u from ED visit on 7/3/25    Aracelis Kowalski RN

## 2025-07-08 ENCOUNTER — CARE COORDINATION (OUTPATIENT)
Dept: CARE COORDINATION | Age: 80
End: 2025-07-08

## 2025-07-08 NOTE — CARE COORDINATION
Care Transitions Note    Follow Up Call     Attempted to reach patient for transitions of care follow up.  Unable to reach patient.      Outreach Attempts:   2nd attempt to reach pt for an ED/sub care transition call. HIPAA compliant voicemail left for patient.     Care Summary Note: No further outreaches will be attempted.    If no return call by the end of today, CTN will  sign off and resolve the CT program.    Follow Up Appointment:   Future Appointments         Provider Specialty Dept Phone    8/14/2025 1:10 PM Ricardo Farooq, DO Family Medicine [phone]    8/20/2025 1:10 PM Ricardo Farooq, DO Family Medicine [phone]            No further follow-up call indicated.    Aracelis Kowalski RN        
attended as scheduled   Future Appointments         Provider Specialty Dept Phone    8/14/2025 1:10 PM Ricardo Farooq,  Family Medicine [phone]    8/20/2025 1:10 PM Ricardo Farooq,  Family Medicine [phone]            Care Transition Nurse provided contact information.  Plan for follow-up call in 6-10 days based on severity of symptoms and risk factors.  Plan for next call: symptom management-Any recurrent dizziness/lightheadedness or sob?   self management-BPs stable?  Final outreach~ any ongoing needs     Aracelis Kowalski RN

## 2025-07-16 ENCOUNTER — CARE COORDINATION (OUTPATIENT)
Dept: CARE COORDINATION | Age: 80
End: 2025-07-16

## 2025-07-16 NOTE — CARE COORDINATION
Care Transitions Note    Final Call     Patient Current Location:  Home: [address]   [address]  [facility] [place] [address]    Care Transition Nurse contacted the patient by telephone. Verified name and  as identifiers.    Patient graduated from the Care Transitions program on 25.  Patient/family verbalizes confidence in the ability to self-manage at this time. reinforced resources provided during this care transition period..      Advance Care Planning:   Does patient have an Advance Directive: reviewed during previous call, see note. .    Handoff:   Patient was not referred to the ACM team due to no additional needs identified.       Care Summary Note: CTN received a return call from the pt. CTN spoke to the patient for a final care transition call.     Pt seen recently in ED with c/o lightheadedness and sob with elevated BP. Pt states that she has been feeling \"good\" and reports no recurrent lightheadedness/dizziness or sob. She does monitor her BP at home and reports it is never as high as it is at her OVs with doctors or the hospital. Pt states that  SBPs continue to be averaging between 110-120 at home. She denies any CP, palpitations, or LE edema. She confirms she is taking her medications and has all refills.    The pt voices no ongoing needs or concerns.    CTN signing off and resolving the CT program as the program is ending.    Assessments:  Care Transitions Subsequent and Final Call    Schedule Follow Up Appointment with PCP: Completed  Subsequent and Final Calls  Do you have any ongoing symptoms?: No  Have your medications changed?: No  Do you have any questions related to your medications?: No  Do you currently have any active services?: No  Do you have any needs or concerns that I can assist you with?: No  Identified Barriers: None  Care Transitions Interventions  No Identified Needs  Other Interventions:              Upcoming Appointments:    Future Appointments         Provider Specialty Dept Phone

## 2025-07-16 NOTE — CARE COORDINATION
Care Transitions Note    Final Call     Attempted to reach patient for transitions of care follow up.  Unable to reach patient.      Outreach Attempts:   HIPAA compliant voicemail left for patient.     Patient closed (unable to reach patient) from the Care Transitions program on 7/16/25.  Patient not reached for final call.      Handoff:   Patient was not referred to the ACM team due to no additional needs identified.       Care Summary Note: No further outreaches will be attempted.    If no return call by the end of today, CTN will  sign off and resolve the CT program as the program is ending.      Assessments:  N/a    Upcoming Appointments:    Future Appointments         Provider Specialty Dept Phone    8/14/2025 1:10 PM Ricardo Farooq, DO Family Medicine [phone]    8/20/2025 1:10 PM Ricardo Farooq, DO Family Medicine [phone]            Aracelis Kowalski RN

## 2025-07-18 DIAGNOSIS — I48.0 PAROXYSMAL ATRIAL FIBRILLATION (HCC): ICD-10-CM

## 2025-08-14 ENCOUNTER — OFFICE VISIT (OUTPATIENT)
Dept: FAMILY MEDICINE CLINIC | Age: 80
End: 2025-08-14

## 2025-08-14 VITALS
DIASTOLIC BLOOD PRESSURE: 70 MMHG | OXYGEN SATURATION: 95 % | SYSTOLIC BLOOD PRESSURE: 138 MMHG | WEIGHT: 164.2 LBS | HEIGHT: 62 IN | HEART RATE: 52 BPM | BODY MASS INDEX: 30.22 KG/M2 | TEMPERATURE: 97.2 F

## 2025-08-14 DIAGNOSIS — I10 ESSENTIAL HYPERTENSION: ICD-10-CM

## 2025-08-14 DIAGNOSIS — R00.1 BRADYCARDIA: ICD-10-CM

## 2025-08-14 DIAGNOSIS — I48.0 PAROXYSMAL ATRIAL FIBRILLATION (HCC): Primary | ICD-10-CM

## 2025-08-14 DIAGNOSIS — R42 DIZZINESS: ICD-10-CM

## 2025-08-14 DIAGNOSIS — E87.6 HYPOKALEMIA: ICD-10-CM

## 2025-08-14 DIAGNOSIS — H70.11 CHRONIC MASTOIDITIS OF RIGHT SIDE: ICD-10-CM

## 2025-08-14 DIAGNOSIS — Z12.31 SCREENING MAMMOGRAM FOR BREAST CANCER: ICD-10-CM

## 2025-08-14 RX ORDER — HYDRALAZINE HYDROCHLORIDE 50 MG/1
50 TABLET, FILM COATED ORAL 2 TIMES DAILY
Qty: 60 TABLET | Refills: 3 | Status: SHIPPED | OUTPATIENT
Start: 2025-08-14

## 2025-08-14 RX ORDER — POTASSIUM CHLORIDE 750 MG/1
TABLET, EXTENDED RELEASE ORAL
Qty: 90 TABLET | Refills: 1 | Status: SHIPPED | OUTPATIENT
Start: 2025-08-14 | End: 2026-08-15

## 2025-08-15 ASSESSMENT — ENCOUNTER SYMPTOMS
ABDOMINAL PAIN: 0
COUGH: 0
NAUSEA: 0
SHORTNESS OF BREATH: 0

## 2025-08-20 ENCOUNTER — OFFICE VISIT (OUTPATIENT)
Dept: FAMILY MEDICINE CLINIC | Age: 80
End: 2025-08-20

## 2025-08-20 VITALS
DIASTOLIC BLOOD PRESSURE: 62 MMHG | HEIGHT: 62 IN | OXYGEN SATURATION: 95 % | HEART RATE: 69 BPM | SYSTOLIC BLOOD PRESSURE: 170 MMHG | WEIGHT: 164.8 LBS | BODY MASS INDEX: 30.33 KG/M2 | TEMPERATURE: 97 F

## 2025-08-20 DIAGNOSIS — I49.5 SINUS NODE DYSFUNCTION (HCC): ICD-10-CM

## 2025-08-20 DIAGNOSIS — E87.6 HYPOKALEMIA: ICD-10-CM

## 2025-08-20 DIAGNOSIS — I10 ESSENTIAL HYPERTENSION: Primary | ICD-10-CM

## 2025-08-20 DIAGNOSIS — R00.1 SYMPTOMATIC BRADYCARDIA: ICD-10-CM

## 2025-08-20 DIAGNOSIS — I10 ESSENTIAL HYPERTENSION: ICD-10-CM

## 2025-08-20 LAB
ANION GAP SERPL CALCULATED.3IONS-SCNC: 14 MMOL/L (ref 7–16)
BUN BLDV-MCNC: 14 MG/DL (ref 8–23)
CALCIUM SERPL-MCNC: 10.2 MG/DL (ref 8.8–10.2)
CHLORIDE BLD-SCNC: 103 MMOL/L (ref 98–107)
CO2: 26 MMOL/L (ref 22–29)
CREAT SERPL-MCNC: 0.8 MG/DL (ref 0.5–1)
GFR, ESTIMATED: 71 ML/MIN/1.73M2
GLUCOSE BLD-MCNC: 101 MG/DL (ref 74–99)
POTASSIUM SERPL-SCNC: 4.2 MMOL/L (ref 3.5–5.1)
SODIUM BLD-SCNC: 143 MMOL/L (ref 136–145)

## 2025-08-20 RX ORDER — HYDRALAZINE HYDROCHLORIDE 50 MG/1
75 TABLET, FILM COATED ORAL 2 TIMES DAILY
Qty: 90 TABLET | Refills: 3 | Status: SHIPPED | OUTPATIENT
Start: 2025-08-20

## 2025-08-20 ASSESSMENT — ENCOUNTER SYMPTOMS
SHORTNESS OF BREATH: 0
NAUSEA: 0
COUGH: 0
ABDOMINAL PAIN: 0

## (undated) DEVICE — Device

## (undated) DEVICE — GLOVE ORANGE PI 8 1/2   MSG9085

## (undated) DEVICE — SET SURG BASIN MAYO REUSABLE

## (undated) DEVICE — COUNTER NDL 30 COUNT DBL MAG

## (undated) DEVICE — COTTON STRIP: Brand: DEROYAL

## (undated) DEVICE — MARKER,SKIN,WI/RULER AND LABELS: Brand: MEDLINE

## (undated) DEVICE — BLADE SURG L3IN MYR SPEAR TIP UNPROTECTED W O HNDL S STL

## (undated) DEVICE — SET INSTR MYRINGOTOMY HOUSE

## (undated) DEVICE — TOWEL,OR,DSP,ST,BLUE,STD,6/PK,12PK/CS: Brand: MEDLINE

## (undated) DEVICE — GOWN,SIRUS,FABRNF,XL,20/CS: Brand: MEDLINE

## (undated) DEVICE — TUBING, SUCTION, 3/16" X 12', STRAIGHT: Brand: MEDLINE

## (undated) DEVICE — DRAPE,REIN 53X77,STERILE: Brand: MEDLINE